# Patient Record
Sex: MALE | Race: WHITE | NOT HISPANIC OR LATINO | Employment: PART TIME | URBAN - METROPOLITAN AREA
[De-identification: names, ages, dates, MRNs, and addresses within clinical notes are randomized per-mention and may not be internally consistent; named-entity substitution may affect disease eponyms.]

---

## 2019-01-14 ENCOUNTER — OFFICE VISIT (OUTPATIENT)
Dept: SURGERY | Facility: CLINIC | Age: 74
End: 2019-01-14
Payer: MEDICARE

## 2019-01-14 VITALS
DIASTOLIC BLOOD PRESSURE: 80 MMHG | HEIGHT: 65 IN | HEART RATE: 75 BPM | SYSTOLIC BLOOD PRESSURE: 118 MMHG | BODY MASS INDEX: 25.16 KG/M2 | WEIGHT: 151 LBS

## 2019-01-14 DIAGNOSIS — K40.30 UNILATERAL INGUINAL HERNIA WITH OBSTRUCTION AND WITHOUT GANGRENE, RECURRENCE NOT SPECIFIED: Primary | ICD-10-CM

## 2019-01-14 PROCEDURE — 1101F PR PT FALLS ASSESS DOC 0-1 FALLS W/OUT INJ PAST YR: ICD-10-PCS | Mod: CPTII,S$GLB,, | Performed by: SURGERY

## 2019-01-14 PROCEDURE — 3079F PR MOST RECENT DIASTOLIC BLOOD PRESSURE 80-89 MM HG: ICD-10-PCS | Mod: CPTII,S$GLB,, | Performed by: SURGERY

## 2019-01-14 PROCEDURE — 3074F PR MOST RECENT SYSTOLIC BLOOD PRESSURE < 130 MM HG: ICD-10-PCS | Mod: CPTII,S$GLB,, | Performed by: SURGERY

## 2019-01-14 PROCEDURE — 3074F SYST BP LT 130 MM HG: CPT | Mod: CPTII,S$GLB,, | Performed by: SURGERY

## 2019-01-14 PROCEDURE — 99204 OFFICE O/P NEW MOD 45 MIN: CPT | Mod: S$GLB,,, | Performed by: SURGERY

## 2019-01-14 PROCEDURE — 3079F DIAST BP 80-89 MM HG: CPT | Mod: CPTII,S$GLB,, | Performed by: SURGERY

## 2019-01-14 PROCEDURE — 1101F PT FALLS ASSESS-DOCD LE1/YR: CPT | Mod: CPTII,S$GLB,, | Performed by: SURGERY

## 2019-01-14 PROCEDURE — 99204 PR OFFICE/OUTPT VISIT, NEW, LEVL IV, 45-59 MIN: ICD-10-PCS | Mod: S$GLB,,, | Performed by: SURGERY

## 2019-01-14 RX ORDER — SODIUM CHLORIDE 9 MG/ML
INJECTION, SOLUTION INTRAVENOUS CONTINUOUS
Status: CANCELLED | OUTPATIENT
Start: 2019-01-14

## 2019-01-14 RX ORDER — LIDOCAINE HYDROCHLORIDE 10 MG/ML
1 INJECTION, SOLUTION EPIDURAL; INFILTRATION; INTRACAUDAL; PERINEURAL ONCE
Status: DISCONTINUED | OUTPATIENT
Start: 2019-01-14 | End: 2019-01-28 | Stop reason: HOSPADM

## 2019-01-14 NOTE — H&P (VIEW-ONLY)
History & Physical    SUBJECTIVE:     History of Present Illness:  Patient is a 73 y.o. male presents with right groin pain and inguinal hernia for 2-3 months with increase in symptoms recently. Support briefs do give him relief.     Chief Complaint   Patient presents with    Groin Pain     possible bilateral hernia        Review of patient's allergies indicates:   Allergen Reactions    Trazodone      Nausea; felt sedated for days       Current Outpatient Medications   Medication Sig Dispense Refill    acetaminophen (TYLENOL) 325 MG tablet Take 650 mg by mouth every 4 to 6 hours as needed.        amoxicillin (AMOXIL) 500 MG capsule Take 500 mg by mouth 3 (three) times daily.      atovaquone-proguanil (MALARONE) 250-100 mg Tab Take one tablet daily for malaria prevention. Begin one day before entering malarious area and continue for 1 week after return. 25 tablet 0    belladonna alkaloids-opium 16.2-30 mg (BELLADONNA-OPIUM) 16.2-30 mg Supp Place 30 mg rectally every 12 (twelve) hours as needed.      ciprofloxacin (CIPRO) 500 MG tablet Take 500 mg by mouth 2 (two) times daily.      hydrochlorothiazide (HYDRODIURIL) 25 MG tablet Take 1 tablet (25 mg total) by mouth once daily. 90 tablet 3    ibuprofen (ADVIL,MOTRIN) 600 MG tablet Take 600 mg by mouth every 6 (six) hours as needed for Pain.      [facility]-me blue-sod phos-phsal-hyo (URIBEL) 118-10-40.8-36 mg Cap Take by mouth.      oxycodone-acetaminophen (PERCOCET) 5-325 mg per tablet Take 1 tablet by mouth every 4 (four) hours as needed for Pain.      phenazopyridine (PYRIDIUM) 200 MG tablet Take 200 mg by mouth 3 (three) times daily as needed for Pain.      typhoid (VIVOTIF) DR capsule One capsule every other day by mouth for 4 doses. 4 capsule 0    zolpidem (AMBIEN) 10 mg Tab Take 1 tablet (10 mg total) by mouth every evening. 30 tablet 2    lansoprazole (PREVACID) 30 MG capsule Take 1 capsule (30 mg total) by mouth once daily. 30 capsule 0    tamsulosin  "(FLOMAX) 0.4 mg Cp24 Take 1 capsule (0.4 mg total) by mouth after dinner. 30 capsule 12     No current facility-administered medications for this visit.        Past Medical History:   Diagnosis Date    Enlarged prostate     Hypertension      Past Surgical History:   Procedure Laterality Date    ARTHROPLASTY, SHOULDER, TOTAL, REVERSE Right 12/11/2012    [facility]    cystoscope      EXCISION Right 9/23/2013    Performed by Umberto Greenfield MD at [facility] OR    nephrectomy  2006    left for a tumor?    PROSTATE SURGERY      ROTATOR CUFF REPAIR      right    TONSILLECTOMY       Family History   Problem Relation Age of Onset    Cancer Mother         stomach    Heart disease Father     COPD Father      Social History     Tobacco Use    Smoking status: Never Smoker   Substance Use Topics    Alcohol use: No     Alcohol/week: 0.0 oz    Drug use: No        Review of Systems:  Review of Systems   Constitutional: Negative.    HENT: Negative.    Eyes: Negative.    Respiratory: Negative.    Cardiovascular: Negative.    Gastrointestinal: Negative.    Endocrine: Negative.    Musculoskeletal: Negative.    Skin: Negative.    Allergic/Immunologic: Negative.    Neurological: Negative.    Hematological: Negative.    Psychiatric/Behavioral: Negative.    All other systems reviewed and are negative.      OBJECTIVE:     Vital Signs (Most Recent)  Pulse: 75 (01/14/19 1404)  BP: 118/80 (01/14/19 1404)  5' 5" (1.651 m)  68.5 kg (151 lb)     Physical Exam:  Physical Exam   Constitutional: He is oriented to person, place, and time. He appears well-developed and well-nourished.   HENT:   Head: Normocephalic and atraumatic.   Right Ear: External ear normal.   Left Ear: External ear normal.   Nose: Nose normal.   Mouth/Throat: Oropharynx is clear and moist.   Eyes: Conjunctivae and EOM are normal. Pupils are equal, round, and reactive to light.   Neck: Normal range of motion. Neck supple. "   Cardiovascular: Normal rate, regular rhythm, normal heart sounds and intact distal pulses.   Pulmonary/Chest: Effort normal and breath sounds normal.   Abdominal: Soft. Bowel sounds are normal. A hernia is present. Hernia confirmed positive in the right inguinal area.   Genitourinary:         Musculoskeletal: Normal range of motion.   Neurological: He is alert and oriented to person, place, and time. He has normal reflexes.   Skin: Skin is warm and dry.   Psychiatric: He has a normal mood and affect. His behavior is normal. Thought content normal.   Vitals reviewed.      Laboratory  none    Diagnostic Results:  none    ASSESSMENT/PLAN:     Right inguinal hernia     PLAN:Plan     To the OR for Right inguinal hernia repair with the risks and possible complications explained and he agrees to proceed

## 2019-01-14 NOTE — PROGRESS NOTES
History & Physical    SUBJECTIVE:     History of Present Illness:  Patient is a 73 y.o. male presents with right groin pain and inguinal hernia for 2-3 months with increase in symptoms recently. Support briefs do give him relief.     Chief Complaint   Patient presents with    Groin Pain     possible bilateral hernia        Review of patient's allergies indicates:   Allergen Reactions    Trazodone      Nausea; felt sedated for days       Current Outpatient Medications   Medication Sig Dispense Refill    acetaminophen (TYLENOL) 325 MG tablet Take 650 mg by mouth every 4 to 6 hours as needed.        amoxicillin (AMOXIL) 500 MG capsule Take 500 mg by mouth 3 (three) times daily.      atovaquone-proguanil (MALARONE) 250-100 mg Tab Take one tablet daily for malaria prevention. Begin one day before entering malarious area and continue for 1 week after return. 25 tablet 0    belladonna alkaloids-opium 16.2-30 mg (BELLADONNA-OPIUM) 16.2-30 mg Supp Place 30 mg rectally every 12 (twelve) hours as needed.      ciprofloxacin (CIPRO) 500 MG tablet Take 500 mg by mouth 2 (two) times daily.      hydrochlorothiazide (HYDRODIURIL) 25 MG tablet Take 1 tablet (25 mg total) by mouth once daily. 90 tablet 3    ibuprofen (ADVIL,MOTRIN) 600 MG tablet Take 600 mg by mouth every 6 (six) hours as needed for Pain.      [facility]-me blue-sod phos-phsal-hyo (URIBEL) 118-10-40.8-36 mg Cap Take by mouth.      oxycodone-acetaminophen (PERCOCET) 5-325 mg per tablet Take 1 tablet by mouth every 4 (four) hours as needed for Pain.      phenazopyridine (PYRIDIUM) 200 MG tablet Take 200 mg by mouth 3 (three) times daily as needed for Pain.      typhoid (VIVOTIF) DR capsule One capsule every other day by mouth for 4 doses. 4 capsule 0    zolpidem (AMBIEN) 10 mg Tab Take 1 tablet (10 mg total) by mouth every evening. 30 tablet 2    lansoprazole (PREVACID) 30 MG capsule Take 1 capsule (30 mg total) by mouth once daily. 30 capsule 0    tamsulosin  "(FLOMAX) 0.4 mg Cp24 Take 1 capsule (0.4 mg total) by mouth after dinner. 30 capsule 12     No current facility-administered medications for this visit.        Past Medical History:   Diagnosis Date    Enlarged prostate     Hypertension      Past Surgical History:   Procedure Laterality Date    ARTHROPLASTY, SHOULDER, TOTAL, REVERSE Right 12/11/2012    [facility]formed [facility] at [facility] OR    cystoscope      EXCISION Right 9/23/2013    Performed by [facility]to [facility]field [facility] at [facility] OR    nephrectomy  2006    left for a tumor?    PROSTATE SURGERY      ROTATOR CUFF REPAIR      right    TONSILLECTOMY       Family History   Problem Relation Age of Onset    Cancer Mother         stomach    Heart disease Father     COPD Father      Social History     Tobacco Use    Smoking status: Never Smoker   Substance Use Topics    Alcohol use: No     Alcohol/week: 0.0 oz    Drug use: No        Review of Systems:  Review of Systems   Constitutional: Negative.    HENT: Negative.    Eyes: Negative.    Respiratory: Negative.    Cardiovascular: Negative.    Gastrointestinal: Negative.    Endocrine: Negative.    Musculoskeletal: Negative.    Skin: Negative.    Allergic/Immunologic: Negative.    Neurological: Negative.    Hematological: Negative.    Psychiatric/Behavioral: Negative.    All other systems reviewed and are negative.      OBJECTIVE:     Vital Signs (Most Recent)  Pulse: 75 (01/14/19 1404)  BP: 118/80 (01/14/19 1404)  5' 5" (1.651 m)  68.5 kg (151 lb)     Physical Exam:  Physical Exam   Constitutional: He is oriented to person, place, and time. He appears well-developed and well-nourished.   HENT:   Head: Normocephalic and atraumatic.   Right Ear: External ear normal.   Left Ear: External ear normal.   Nose: Nose normal.   Mouth/Throat: Oropharynx is clear and moist.   Eyes: Conjunctivae and EOM are normal. Pupils are equal, round, and reactive to light.   Neck: Normal range of motion. Neck supple. "   Cardiovascular: Normal rate, regular rhythm, normal heart sounds and intact distal pulses.   Pulmonary/Chest: Effort normal and breath sounds normal.   Abdominal: Soft. Bowel sounds are normal. A hernia is present. Hernia confirmed positive in the right inguinal area.   Genitourinary:         Musculoskeletal: Normal range of motion.   Neurological: He is alert and oriented to person, place, and time. He has normal reflexes.   Skin: Skin is warm and dry.   Psychiatric: He has a normal mood and affect. His behavior is normal. Thought content normal.   Vitals reviewed.      Laboratory  none    Diagnostic Results:  none    ASSESSMENT/PLAN:     Right inguinal hernia     PLAN:Plan     To the OR for Right inguinal hernia repair with the risks and possible complications explained and he agrees to proceed

## 2019-01-14 NOTE — LETTER
January 14, 2019      Donna Green MD  [address] [facility]  [address]  [facility]  [place] [address]           [facility], [facility]  [address] [facility] [address], [address]  [place] [address]  Phone: [phone]  Fax: [phone]          Patient: Jose Davis   MR Number: [id number]   YOB: 1945   Date of Visit: 1/14/2019       Dear Dr. Donna Green:    Thank you for referring Jose Davis to me for evaluation. Attached you will find relevant portions of my assessment and plan of care.    If you have questions, please do not hesitate to call me. I look forward to following Jsoe Davis along with you.    Sincerely,    Marco Antonio Paniagua MD    Enclosure  CC:  No Recipients    If you would like to receive this communication electronically, please contact [email or web address] or [phone] to request more information on [employer] access.    For providers and/or their staff who would like to refer a patient to [facility], please contact us through our one-stop-shop provider referral line, [facility], at [phone].    If you feel you have received this communication in error or would no longer like to receive these types of communications, please e-mail [email or web address]

## 2019-01-21 ENCOUNTER — HOSPITAL ENCOUNTER (OUTPATIENT)
Dept: PREADMISSION TESTING | Facility: HOSPITAL | Age: 74
Discharge: HOME | End: 2019-01-21
Attending: SURGERY
Payer: MEDICARE

## 2019-01-21 VITALS
WEIGHT: 135 LBS | HEIGHT: 65 IN | OXYGEN SATURATION: 99 % | BODY MASS INDEX: 22.49 KG/M2 | DIASTOLIC BLOOD PRESSURE: 84 MMHG | RESPIRATION RATE: 18 BRPM | HEART RATE: 64 BPM | TEMPERATURE: 98 F | SYSTOLIC BLOOD PRESSURE: 136 MMHG

## 2019-01-21 DIAGNOSIS — Z01.818 PREOP TESTING: Primary | ICD-10-CM

## 2019-01-21 LAB
ANION GAP SERPL CALC-SCNC: 8 MMOL/L
BASOPHILS # BLD AUTO: 0.01 K/UL
BASOPHILS NFR BLD: 0.1 %
BUN SERPL-MCNC: 21 MG/DL
CALCIUM SERPL-MCNC: 9.5 MG/DL
CHLORIDE SERPL-SCNC: 103 MMOL/L
CO2 SERPL-SCNC: 26 MMOL/L
CREAT SERPL-MCNC: 1.1 MG/DL
DIFFERENTIAL METHOD: ABNORMAL
EOSINOPHIL # BLD AUTO: 0.3 K/UL
EOSINOPHIL NFR BLD: 4.4 %
ERYTHROCYTE [DISTWIDTH] IN BLOOD BY AUTOMATED COUNT: 12.8 %
EST. GFR  (AFRICAN AMERICAN): >60 ML/MIN/1.73 M^2
EST. GFR  (NON AFRICAN AMERICAN): >60 ML/MIN/1.73 M^2
GLUCOSE SERPL-MCNC: 101 MG/DL
HCT VFR BLD AUTO: 40.7 %
HGB BLD-MCNC: 13.7 G/DL
LYMPHOCYTES # BLD AUTO: 1.2 K/UL
LYMPHOCYTES NFR BLD: 18 %
MCH RBC QN AUTO: 29.4 PG
MCHC RBC AUTO-ENTMCNC: 33.7 G/DL
MCV RBC AUTO: 87 FL
MONOCYTES # BLD AUTO: 0.5 K/UL
MONOCYTES NFR BLD: 7.2 %
NEUTROPHILS # BLD AUTO: 4.8 K/UL
NEUTROPHILS NFR BLD: 70.3 %
PLATELET # BLD AUTO: 210 K/UL
PMV BLD AUTO: 10.1 FL
POTASSIUM SERPL-SCNC: 4 MMOL/L
RBC # BLD AUTO: 4.66 M/UL
SODIUM SERPL-SCNC: 137 MMOL/L
WBC # BLD AUTO: 6.85 K/UL

## 2019-01-21 PROCEDURE — 85025 COMPLETE CBC W/AUTO DIFF WBC: CPT

## 2019-01-21 PROCEDURE — 93010 ELECTROCARDIOGRAM REPORT: CPT | Mod: ,,, | Performed by: INTERNAL MEDICINE

## 2019-01-21 PROCEDURE — 36415 COLL VENOUS BLD VENIPUNCTURE: CPT

## 2019-01-21 PROCEDURE — 93010 EKG 12-LEAD: ICD-10-PCS | Mod: ,,, | Performed by: INTERNAL MEDICINE

## 2019-01-21 PROCEDURE — 80048 BASIC METABOLIC PNL TOTAL CA: CPT

## 2019-01-21 PROCEDURE — 93005 ELECTROCARDIOGRAM TRACING: CPT

## 2019-01-21 RX ORDER — ASPIRIN 81 MG/1
81 TABLET ORAL DAILY
COMMUNITY

## 2019-01-21 RX ORDER — ROSUVASTATIN CALCIUM 20 MG/1
20 TABLET, COATED ORAL NIGHTLY
COMMUNITY

## 2019-01-21 RX ORDER — AMLODIPINE BESYLATE 5 MG/1
5 TABLET ORAL DAILY
COMMUNITY
End: 2022-05-20 | Stop reason: CLARIF

## 2019-01-21 NOTE — DISCHARGE INSTRUCTIONS
"Your procedure  is scheduled for __1/28/2019________.    Call [phone] between 2pm and 5pm on _1/25/2019______to find out your arrival time for the day of surgery.    Report to Same Day Surgery Unit at ____ AM on the 2nd floor of the hospital.  Use the front entrance of the hospital.  The front doors of the hospital open promptly at 5:30am.  If you need wheelchair assistance, call [phone] from your cell phone, or call "0" from the courtesy phone in the lobby.    Important instructions:   Do not eat or drink after 12 midnight, including water.  It is okay to brush your teeth.  Do not have gum, candy or mints.     Take only these medications with a small swallow of water on the morning of your surgery ___amlodipine___________        Stop taking Aspirin, Ibuprofen, Motrin and Aleve , Fish oil, and Vitamin E for at least 7 days before your surgery. You may use Tylenol unless otherwise instructed by your doctor.         Please shower the night before and the morning of your surgery.        Use Hibiclens soap to your surgery site if instructed by your pre op nurse.   If your surgery is on your abdomen, be sure to wash your naval.  Be sure to rinse off Hibiclens after it is on your skin for several minutes.  Do not use Hibiclens on your face or genitals. Please place clean linens on your bed the night before surgery. Please wear fresh clean clothing after each shower.     No shaving of procedural area at least 4-5 days before surgery due to increased risk of skin irritation and/or possible infection.     You may wear deodorant only.      Do not wear powder, body lotion or perfume/cologne.     Do not wear any jewelry or have any metal on your body.     You will be asked to remove any dentures or partials for the procedure.     Please bring any documents given to you by your doctor.     If you are going home on the same day of surgery, you must arrange for a family member or a friend to drive you home.  Public " transportation is prohibited.  You will not be able to drive home if you were given anesthesia or sedation.     Wear loose fitting clothes allowing for bandages.     Please leave money and valuables home.       You may bring your cell phone.     Call the doctor if fever or illness should occur before your surgery.    Call [phone] to contact us here if needed.

## 2019-01-28 ENCOUNTER — ANESTHESIA EVENT (OUTPATIENT)
Dept: SURGERY | Facility: HOSPITAL | Age: 74
End: 2019-01-28
Payer: MEDICARE

## 2019-01-28 ENCOUNTER — NURSE TRIAGE (OUTPATIENT)
Dept: ADMINISTRATIVE | Facility: CLINIC | Age: 74
End: 2019-01-28

## 2019-01-28 ENCOUNTER — ANESTHESIA (OUTPATIENT)
Dept: SURGERY | Facility: HOSPITAL | Age: 74
End: 2019-01-28
Payer: MEDICARE

## 2019-01-28 ENCOUNTER — HOSPITAL ENCOUNTER (OUTPATIENT)
Facility: HOSPITAL | Age: 74
Discharge: HOME | End: 2019-01-28
Attending: SURGERY | Admitting: SURGERY
Payer: MEDICARE

## 2019-01-28 VITALS
HEART RATE: 67 BPM | RESPIRATION RATE: 18 BRPM | OXYGEN SATURATION: 96 % | BODY MASS INDEX: 22.49 KG/M2 | WEIGHT: 135 LBS | HEIGHT: 65 IN | TEMPERATURE: 98 F | SYSTOLIC BLOOD PRESSURE: 130 MMHG | DIASTOLIC BLOOD PRESSURE: 62 MMHG

## 2019-01-28 DIAGNOSIS — K40.30 UNILATERAL INGUINAL HERNIA WITH OBSTRUCTION AND WITHOUT GANGRENE, RECURRENCE NOT SPECIFIED: Primary | ICD-10-CM

## 2019-01-28 LAB — POCT GLUCOSE: 110 MG/DL (ref 70–110)

## 2019-01-28 PROCEDURE — C9290 INJ, BUPIVACAINE LIPOSOME: HCPCS | Performed by: SURGERY

## 2019-01-28 PROCEDURE — 88302 TISSUE EXAM BY PATHOLOGIST: CPT | Mod: 26,,, | Performed by: PATHOLOGY

## 2019-01-28 PROCEDURE — D9220A PRA ANESTHESIA: ICD-10-PCS | Mod: CRNA,,, | Performed by: NURSE ANESTHETIST, CERTIFIED REGISTERED

## 2019-01-28 PROCEDURE — 36000707: Performed by: SURGERY

## 2019-01-28 PROCEDURE — 63600175 PHARM REV CODE 636 W HCPCS: Performed by: NURSE ANESTHETIST, CERTIFIED REGISTERED

## 2019-01-28 PROCEDURE — D9220A PRA ANESTHESIA: Mod: ANES,,, | Performed by: ANESTHESIOLOGY

## 2019-01-28 PROCEDURE — 25000003 PHARM REV CODE 250: Performed by: SURGERY

## 2019-01-28 PROCEDURE — 25000003 PHARM REV CODE 250: Performed by: NURSE ANESTHETIST, CERTIFIED REGISTERED

## 2019-01-28 PROCEDURE — C1781 MESH (IMPLANTABLE): HCPCS | Performed by: SURGERY

## 2019-01-28 PROCEDURE — 71000033 HC RECOVERY, INTIAL HOUR: Performed by: SURGERY

## 2019-01-28 PROCEDURE — 71000039 HC RECOVERY, EACH ADD'L HOUR: Performed by: SURGERY

## 2019-01-28 PROCEDURE — D9220A PRA ANESTHESIA: ICD-10-PCS | Mod: ANES,,, | Performed by: ANESTHESIOLOGY

## 2019-01-28 PROCEDURE — S0020 INJECTION, BUPIVICAINE HYDRO: HCPCS | Performed by: SURGERY

## 2019-01-28 PROCEDURE — 63600175 PHARM REV CODE 636 W HCPCS: Performed by: SURGERY

## 2019-01-28 PROCEDURE — 71000015 HC POSTOP RECOV 1ST HR: Performed by: SURGERY

## 2019-01-28 PROCEDURE — 37000009 HC ANESTHESIA EA ADD 15 MINS: Performed by: SURGERY

## 2019-01-28 PROCEDURE — 71000016 HC POSTOP RECOV ADDL HR: Performed by: SURGERY

## 2019-01-28 PROCEDURE — 82962 GLUCOSE BLOOD TEST: CPT | Performed by: SURGERY

## 2019-01-28 PROCEDURE — 37000008 HC ANESTHESIA 1ST 15 MINUTES: Performed by: SURGERY

## 2019-01-28 PROCEDURE — D9220A PRA ANESTHESIA: Mod: CRNA,,, | Performed by: NURSE ANESTHETIST, CERTIFIED REGISTERED

## 2019-01-28 PROCEDURE — 88302 TISSUE SPECIMEN TO PATHOLOGY - SURGERY: ICD-10-PCS | Mod: 26,,, | Performed by: PATHOLOGY

## 2019-01-28 PROCEDURE — 49505 PRP I/HERN INIT REDUC >5 YR: CPT | Mod: RT,,, | Performed by: SURGERY

## 2019-01-28 PROCEDURE — 49505 PR REPAIR ING HERNIA,5+Y/O,REDUCIBL: ICD-10-PCS | Mod: RT,,, | Performed by: SURGERY

## 2019-01-28 PROCEDURE — 25000003 PHARM REV CODE 250: Performed by: ANESTHESIOLOGY

## 2019-01-28 PROCEDURE — 88302 TISSUE EXAM BY PATHOLOGIST: CPT | Performed by: PATHOLOGY

## 2019-01-28 PROCEDURE — 36000706: Performed by: SURGERY

## 2019-01-28 DEVICE — MESH PROLENE HERNIA 3IN: Type: IMPLANTABLE DEVICE | Site: GROIN | Status: FUNCTIONAL

## 2019-01-28 RX ORDER — ACETAMINOPHEN 10 MG/ML
1000 INJECTION, SOLUTION INTRAVENOUS ONCE
Status: COMPLETED | OUTPATIENT
Start: 2019-01-28 | End: 2019-01-28

## 2019-01-28 RX ORDER — FENTANYL CITRATE 50 UG/ML
INJECTION, SOLUTION INTRAMUSCULAR; INTRAVENOUS
Status: DISCONTINUED | OUTPATIENT
Start: 2019-01-28 | End: 2019-01-28

## 2019-01-28 RX ORDER — ROCURONIUM BROMIDE 10 MG/ML
INJECTION, SOLUTION INTRAVENOUS
Status: DISCONTINUED | OUTPATIENT
Start: 2019-01-28 | End: 2019-01-28

## 2019-01-28 RX ORDER — SODIUM CHLORIDE 0.9 % (FLUSH) 0.9 %
3 SYRINGE (ML) INJECTION
Status: DISCONTINUED | OUTPATIENT
Start: 2019-01-28 | End: 2019-01-28 | Stop reason: HOSPADM

## 2019-01-28 RX ORDER — OXYCODONE AND ACETAMINOPHEN 5; 325 MG/1; MG/1
1 TABLET ORAL EVERY 4 HOURS PRN
Qty: 30 TABLET | Refills: 0 | Status: SHIPPED | OUTPATIENT
Start: 2019-01-28 | End: 2019-02-20

## 2019-01-28 RX ORDER — BUPIVACAINE HYDROCHLORIDE 2.5 MG/ML
INJECTION, SOLUTION INFILTRATION; PERINEURAL
Status: DISCONTINUED | OUTPATIENT
Start: 2019-01-28 | End: 2019-01-28 | Stop reason: HOSPADM

## 2019-01-28 RX ORDER — LIDOCAINE HCL/PF 100 MG/5ML
SYRINGE (ML) INTRAVENOUS
Status: DISCONTINUED | OUTPATIENT
Start: 2019-01-28 | End: 2019-01-28

## 2019-01-28 RX ORDER — SODIUM CHLORIDE 9 MG/ML
INJECTION, SOLUTION INTRAVENOUS CONTINUOUS
Status: DISCONTINUED | OUTPATIENT
Start: 2019-01-28 | End: 2019-01-28 | Stop reason: HOSPADM

## 2019-01-28 RX ORDER — LIDOCAINE HYDROCHLORIDE 10 MG/ML
1 INJECTION, SOLUTION EPIDURAL; INFILTRATION; INTRACAUDAL; PERINEURAL ONCE
Status: DISCONTINUED | OUTPATIENT
Start: 2019-01-28 | End: 2019-01-28 | Stop reason: HOSPADM

## 2019-01-28 RX ORDER — CEFAZOLIN SODIUM 2 G/50ML
2 SOLUTION INTRAVENOUS
Status: COMPLETED | OUTPATIENT
Start: 2019-01-28 | End: 2019-01-28

## 2019-01-28 RX ORDER — ONDANSETRON 2 MG/ML
INJECTION INTRAMUSCULAR; INTRAVENOUS
Status: DISCONTINUED | OUTPATIENT
Start: 2019-01-28 | End: 2019-01-28

## 2019-01-28 RX ORDER — METOCLOPRAMIDE HYDROCHLORIDE 5 MG/ML
INJECTION INTRAMUSCULAR; INTRAVENOUS
Status: DISCONTINUED | OUTPATIENT
Start: 2019-01-28 | End: 2019-01-28

## 2019-01-28 RX ORDER — GLYCOPYRROLATE 0.2 MG/ML
INJECTION INTRAMUSCULAR; INTRAVENOUS
Status: DISCONTINUED | OUTPATIENT
Start: 2019-01-28 | End: 2019-01-28

## 2019-01-28 RX ORDER — MORPHINE SULFATE 10 MG/ML
3 INJECTION INTRAMUSCULAR; INTRAVENOUS; SUBCUTANEOUS
Status: DISCONTINUED | OUTPATIENT
Start: 2019-01-28 | End: 2019-01-28 | Stop reason: HOSPADM

## 2019-01-28 RX ORDER — PHENYLEPHRINE HYDROCHLORIDE 10 MG/ML
INJECTION INTRAVENOUS
Status: DISCONTINUED | OUTPATIENT
Start: 2019-01-28 | End: 2019-01-28

## 2019-01-28 RX ORDER — SUCCINYLCHOLINE CHLORIDE 20 MG/ML
INJECTION INTRAMUSCULAR; INTRAVENOUS
Status: DISCONTINUED | OUTPATIENT
Start: 2019-01-28 | End: 2019-01-28

## 2019-01-28 RX ORDER — NEOSTIGMINE METHYLSULFATE 1 MG/ML
INJECTION, SOLUTION INTRAVENOUS
Status: DISCONTINUED | OUTPATIENT
Start: 2019-01-28 | End: 2019-01-28

## 2019-01-28 RX ORDER — SODIUM CHLORIDE, SODIUM LACTATE, POTASSIUM CHLORIDE, CALCIUM CHLORIDE 600; 310; 30; 20 MG/100ML; MG/100ML; MG/100ML; MG/100ML
INJECTION, SOLUTION INTRAVENOUS CONTINUOUS
Status: DISCONTINUED | OUTPATIENT
Start: 2019-01-28 | End: 2019-01-28 | Stop reason: HOSPADM

## 2019-01-28 RX ORDER — PROPOFOL 10 MG/ML
VIAL (ML) INTRAVENOUS
Status: DISCONTINUED | OUTPATIENT
Start: 2019-01-28 | End: 2019-01-28

## 2019-01-28 RX ORDER — MIDAZOLAM HYDROCHLORIDE 1 MG/ML
INJECTION, SOLUTION INTRAMUSCULAR; INTRAVENOUS
Status: DISCONTINUED | OUTPATIENT
Start: 2019-01-28 | End: 2019-01-28

## 2019-01-28 RX ORDER — HYDROMORPHONE HYDROCHLORIDE 2 MG/ML
0.2 INJECTION, SOLUTION INTRAMUSCULAR; INTRAVENOUS; SUBCUTANEOUS EVERY 5 MIN PRN
Status: DISCONTINUED | OUTPATIENT
Start: 2019-01-28 | End: 2019-01-28 | Stop reason: HOSPADM

## 2019-01-28 RX ADMIN — PHENYLEPHRINE HYDROCHLORIDE 100 MCG: 10 INJECTION INTRAVENOUS at 11:01

## 2019-01-28 RX ADMIN — ROCURONIUM BROMIDE 5 MG: 10 INJECTION, SOLUTION INTRAVENOUS at 10:01

## 2019-01-28 RX ADMIN — ACETAMINOPHEN 1000 MG: 10 INJECTION, SOLUTION INTRAVENOUS at 12:01

## 2019-01-28 RX ADMIN — GLYCOPYRROLATE 0.6 MG: 0.2 INJECTION, SOLUTION INTRAMUSCULAR; INTRAVENOUS at 11:01

## 2019-01-28 RX ADMIN — METOCLOPRAMIDE 10 MG: 5 INJECTION, SOLUTION INTRAMUSCULAR; INTRAVENOUS at 10:01

## 2019-01-28 RX ADMIN — CEFAZOLIN SODIUM 2 G: 2 SOLUTION INTRAVENOUS at 10:01

## 2019-01-28 RX ADMIN — SUCCINYLCHOLINE CHLORIDE 120 MG: 20 INJECTION, SOLUTION INTRAMUSCULAR; INTRAVENOUS at 10:01

## 2019-01-28 RX ADMIN — NEOSTIGMINE METHYLSULFATE 5 MG: 1 INJECTION INTRAVENOUS at 11:01

## 2019-01-28 RX ADMIN — ONDANSETRON 4 MG: 2 INJECTION, SOLUTION INTRAMUSCULAR; INTRAVENOUS at 10:01

## 2019-01-28 RX ADMIN — GLYCOPYRROLATE 0.2 MG: 0.2 INJECTION, SOLUTION INTRAMUSCULAR; INTRAVENOUS at 10:01

## 2019-01-28 RX ADMIN — MIDAZOLAM HYDROCHLORIDE 2 MG: 1 INJECTION, SOLUTION INTRAMUSCULAR; INTRAVENOUS at 10:01

## 2019-01-28 RX ADMIN — ROCURONIUM BROMIDE 25 MG: 10 INJECTION, SOLUTION INTRAVENOUS at 10:01

## 2019-01-28 RX ADMIN — FENTANYL CITRATE 100 MCG: 50 INJECTION INTRAMUSCULAR; INTRAVENOUS at 10:01

## 2019-01-28 RX ADMIN — PHENYLEPHRINE HYDROCHLORIDE 200 MCG: 10 INJECTION INTRAVENOUS at 10:01

## 2019-01-28 RX ADMIN — SODIUM CHLORIDE, SODIUM LACTATE, POTASSIUM CHLORIDE, AND CALCIUM CHLORIDE: .6; .31; .03; .02 INJECTION, SOLUTION INTRAVENOUS at 11:01

## 2019-01-28 RX ADMIN — SODIUM CHLORIDE: 0.9 INJECTION, SOLUTION INTRAVENOUS at 07:01

## 2019-01-28 RX ADMIN — LIDOCAINE HYDROCHLORIDE 100 MG: 20 INJECTION, SOLUTION INTRAVENOUS at 10:01

## 2019-01-28 RX ADMIN — Medication 10 MG: at 11:01

## 2019-01-28 RX ADMIN — Medication 20 MG: at 11:01

## 2019-01-28 RX ADMIN — PROPOFOL 140 MG: 10 INJECTION, EMULSION INTRAVENOUS at 10:01

## 2019-01-28 NOTE — ANESTHESIA PREPROCEDURE EVALUATION
01/28/2019  Jose Davis is a 73 y.o., male.    Anesthesia Evaluation         Review of Systems  Anesthesia Hx:  No previous Anesthesia   Social:  Non-Smoker    Hematology/Oncology:  Hematology Normal   Oncology Normal     EENT/Dental:EENT/Dental Normal   Cardiovascular:   Hypertension Good exercise tolerance   Pulmonary:  Pulmonary Normal    Renal/:  Renal/ Normal     Hepatic/GI:   Liver Disease, Hepatitis    Musculoskeletal:   Arthritis     Neurological:  Neurology Normal    Endocrine:  Endocrine Normal    Dermatological:  Skin Normal    Psych:  Psychiatric Normal           Physical Exam  General:  Well nourished    Airway/Jaw/Neck:  Airway Findings: Mallampati: II TM Distance: < 4 cm      Dental:  DENTAL FINDINGS: Normal   Chest/Lungs:  Chest/Lungs Clear    Heart/Vascular:  Heart Findings: Normal       Mental Status:  Mental Status Findings:  Cooperative, Alert and Oriented         Anesthesia Plan  Type of Anesthesia, risks & benefits discussed:  Anesthesia Type:  general  Patient's Preference:   Intra-op Monitoring Plan: standard ASA monitors  Intra-op Monitoring Plan Comments:   Post Op Pain Control Plan: multimodal analgesia, IV/PO Opioids PRN and per primary service following discharge from PACU  Post Op Pain Control Plan Comments:   Induction:    Beta Blocker:  Patient is not currently on a Beta-Blocker (No further documentation required).       Informed Consent: Patient understands risks and agrees with Anesthesia plan.  Questions answered. Anesthesia consent signed with patient.  ASA Score: 3     Day of Surgery Review of History & Physical:    H&P update referred to the provider.  H&P completed by Anesthesiologist.   Anesthesia Plan Notes: npo        Ready For Surgery From Anesthesia Perspective.

## 2019-01-28 NOTE — INTERVAL H&P NOTE
The patient has been examined and the H&P has been reviewed:    I concur with the findings and no changes have occurred since H&P was written.    Anesthesia/Surgery risks, benefits and alternative options discussed and understood by patient/family.          Active Hospital Problems    Diagnosis  POA    Unilateral inguinal hernia with obstruction and without gangrene [K40.30]  Yes      Resolved Hospital Problems   No resolved problems to display.

## 2019-01-28 NOTE — ANESTHESIA POSTPROCEDURE EVALUATION
"Anesthesia Post Evaluation    Patient: Jose Davis    Procedure(s) Performed: Procedure(s) (LRB):  REPAIR, HERNIA, INGUINAL, WITHOUT HISTORY OF PRIOR REPAIR, AGE 5 YEARS OR OLDER (Right)    Final Anesthesia Type: general  Patient location during evaluation: PACU  Patient participation: Yes- Able to Participate  Level of consciousness: awake and alert, oriented and awake  Post-procedure vital signs: reviewed and stable  Pain management: adequate  Airway patency: patent  PONV status at discharge: No PONV  Anesthetic complications: no      Cardiovascular status: blood pressure returned to baseline, hemodynamically stable and stable  Respiratory status: unassisted and spontaneous ventilation  Hydration status: euvolemic  Follow-up not needed.        Visit Vitals  BP (!) 161/76 (BP Location: Right arm, Patient Position: Lying)   Pulse 85   Temp 36.3 °C (97.3 °F) (Oral)   Resp 18   Ht 5' 5" (1.651 m)   Wt 61.2 kg (135 lb)   SpO2 100%   BMI 22.47 kg/m²       Pain/Dominique Score: Dominique Score: 8 (1/28/2019 11:50 AM)        "

## 2019-01-28 NOTE — BRIEF OP NOTE
[facility]  Brief Operative Note     SUMMARY     Surgery Date: 1/28/2019     Surgeon(s) and Role:     * Marco Antonio Paniagua MD - Primary     * Rufus Dejesus MD - Resident - Assisting        Pre-op Diagnosis:  Unilateral inguinal hernia with obstruction and without gangrene, recurrence not specified [K40.30]    Post-op Diagnosis:  Post-Op Diagnosis Codes:     * Unilateral inguinal hernia with obstruction and without gangrene, recurrence not specified [K40.30]    Procedure(s) (LRB):  REPAIR, HERNIA, INGUINAL, WITHOUT HISTORY OF PRIOR REPAIR, AGE 5 YEARS OR OLDER (Right)    Anesthesia: General    Description of the findings of the procedure: indirect right inguinal hernia    Findings/Key Components: same    Estimated Blood Loss: minimal         Specimens:   Specimen (12h ago, onward)    Start     Ordered    01/28/19 1118  Specimen to Pathology - Surgery  Once     Comments:  Hernia Sac     Start Status   01/28/19 1118 Collected (01/28/19 1105)       01/28/19 1117          Discharge Note    SUMMARY     Admit Date: 1/28/2019    Discharge Date and Time:  01/28/2019 11:23 AM    Hospital Course (synopsis of major diagnoses, care, treatment, and services provided during the course of the hospital stay): uneventful post op course     Final Diagnosis: Post-Op Diagnosis Codes:     * Unilateral inguinal hernia with obstruction and without gangrene, recurrence not specified [K40.30]    Disposition: Home or Self Care    Follow Up/Patient Instructions:     Medications:  Reconciled Home Medications:      Medication List      START taking these medications    oxyCODONE-acetaminophen 5-325 mg per tablet  Commonly known as:  PERCOCET  Take 1 tablet by mouth every 4 (four) hours as needed for Pain.        CONTINUE taking these medications    acetaminophen 325 MG tablet  Commonly known as:  TYLENOL  Take 650 mg by mouth every 4 to 6 hours as needed.     amLODIPine 5 MG tablet  Commonly known as:  NORVASC  Take 5 mg by  mouth once daily.     aspirin 81 MG EC tablet  Commonly known as:  ECOTRIN  Take 81 mg by mouth once daily.     rosuvastatin 20 MG tablet  Commonly known as:  CRESTOR  Take 20 mg by mouth once daily.          Discharge Procedure Orders   Diet general     Call MD for:  temperature >100.4     Call MD for:  persistent nausea and vomiting     Call MD for:  severe uncontrolled pain     Call MD for:  difficulty breathing, headache or visual disturbances     Call MD for:  redness, tenderness, or signs of infection (pain, swelling, redness, odor or green/yellow discharge around incision site)     Call MD for:  hives     Remove dressing in 24 hours     Shower on day dressing removed (No bath)     Follow-up Information     Marco Antonio Paniagua MD In 1 week.    Specialties:  General Surgery, Oncology  Contact information:  [address]  [address] [place] [id number] [phone]

## 2019-01-28 NOTE — DISCHARGE INSTRUCTIONS
Dermabond/ surgical tape (PRINEO)  or a material like it was used on your incision.     It is like a tape/mesh infused with a skin glue .   Do not peel or try to remove it it will start to fall off in 7-10 days on its' own.   It is OK to shower, pat dry, do not apply any creams or lotions.    No tub baths, swimming pools, hot tubs or submersion of the incision until your surgeon says it's ok.          Art[facility]   Office # [phone]     Discharge Instructions for Same Day Surgery     Call the office for and appointment if one has not already been made.     Diet: Drink plenty of fluids the first 48 hours and you may resume your   usual diet.     Activity: No heavy lifting (over 10 pounds), pushing or pulling until your   post op visit. Your doctor's office may have told you to limit your lifting to less weight, or even no weight.  Be sure to follow those instructions.    Note: You may ride in a car and you may drive when comfortable.     Do not drive, drink alcohol, or sign legal documents for 24 hours, or if taking narcotic pain medication.         Medical: Call the doctor for any of the following problems: fever above 101,   severe pain, bleeding, or abdominal distention (swelling).   If constipated you may take any stool softener you choose.     Occasionally small areas of skin numbness or an unpleasant skin sensation can result. Also, you may find that your incision is swollen and tender for a few days.  Some redness around sutures and staples is a normal reaction, but if the discomfort persists or worsens, call you doctor.                                                    Exparel information      To help control your pain after surgery, your surgeon injected Exparel (bupicacaine liposome injectable suspension) into your surgical incision just before the end of the procedure.      Exparel is a local analgesic that contains the local anesthetic bupivacaine..  Local anesthetics provide pain relief  by numbing the tissue around the surgical site.    Exparel is specifically designed to release pain medication over time an can control pain for up to 72 hours.    In addition to Exparel, your surgeon may provide other pain medications to control your pain.    Each patient is different and responds differently to pain medication.  Depending on how you respond to Exparel, you may require less additional pain medication during your recovery.    Side effects can occur with any medication and it is important not to ignore anything you may be experiencing.  Some patients who received Exparel experienced nausea, vomiting, or constipation.  Rarely, patients who receive bupivacaine (the active ingredient in Exparel) have experienced numbness and tingling in their mouth or lips, lightheadedness, or anxiety.  Speak with your doctor right away if you think you may be experiencing any of these sensations, or if you have other questions regarding possible side effects.    Products that contain bupivacaine, like Exparel, may cause a temporary loss of sensation or the ability to move in the area where bupivacaine was injected.    Other formulations of bupivacaine should not be administered within 96 hours following administrations of Exparel.  Do not remove the teal colored bracelet that you have on for 96 hours.  This bracelet will let other health care workers know that you have received Exparel, and not to give you bupivacaine during this 96 hours.    Fall Prevention  Millions of people fall every year and injure themselves. You may have had anesthesia or sedation which may increase your risk of falling. You may have health issues that put you at an increased risk of falling.     Here are ways to reduce your risk of falling.  ·   · Make your home safe by keeping walkways clear of objects you may trip over.  · Use non-slip pads under rugs. Do not use area rugs or small throw rugs.  · Use non-slip mats in bathtubs and  showers.  · Install handrails and lights on staircases.  · Do not walk in poorly lit areas.  · Do not stand on chairs or wobbly ladders.  · Use caution when reaching overhead or looking upward. This position can cause a loss of balance.  · Be sure your shoes fit properly, have non-slip bottoms and are in good condition.   · Wear shoes both inside and out. Avoid going barefoot or wearing slippers.  · Be cautious when going up and down stairs, curbs, and when walking on uneven sidewalks.  · If your balance is poor, consider using a cane or walker.  · If your fall was related to alcohol use, stop or limit alcohol intake.   · If your fall was related to use of sleeping medicines, talk to your doctor about this. You may need to reduce your dosage at bedtime if you awaken during the night to go to the bathroom.    · To reduce the need for nighttime bathroom trips:  ¨ Avoid drinking fluids for several hours before going to bed  ¨ Empty your bladder before going to bed  ¨ Men can keep a urinal at the bedside  · Stay as active as you can. Balance, flexibility, strength, and endurance all come from exercise. They all play a role in preventing falls. Ask your healthcare provider which types of activity are right for you.  · Get your vision checked on a regular basis.  · If you have pets, know where they are before you stand up or walk so you don't trip over them.  · Use night lights.

## 2019-01-28 NOTE — TRANSFER OF CARE
"Anesthesia Transfer of Care Note    Patient: Jose Davis    Procedure(s) Performed: Procedure(s) (LRB):  REPAIR, HERNIA, INGUINAL, WITHOUT HISTORY OF PRIOR REPAIR, AGE 5 YEARS OR OLDER (Right)    Patient location: PACU    Anesthesia Type: general    Transport from OR: Transported from OR on room air with adequate spontaneous ventilation    Post pain: adequate analgesia    Post assessment: no apparent anesthetic complications    Post vital signs: stable    Level of consciousness: awake    Nausea/Vomiting: no nausea/vomiting    Complications: none    Transfer of care protocol was followed      Last vitals:   Visit Vitals  BP (!) 161/76 (BP Location: Right arm, Patient Position: Lying)   Pulse 85   Temp 36.3 °C (97.3 °F) (Oral)   Resp 18   Ht 5' 5" (1.651 m)   Wt 61.2 kg (135 lb)   SpO2 100%   BMI 22.47 kg/m²     "

## 2019-01-28 NOTE — OP NOTE
Inguinal hernia      DATE OF PROCEDURE: 01/28/2019     PREOPERATIVE DIAGNOSIS: Right inguinal hernia.     POSTOPERATIVE DIAGNOSIS: same     PROCEDURE PERFORMED: Right inguinal hernia repair.     SURGEON: Marco Antonio Paniagua M.D.     ANESTHESIA: General.     BLOOD LOSS: Minimal.     DESCRIPTION OF OPERATION: The patient was brought to the Operating Room, placed  on the operating room table in supine position. Under adequate general   anesthesia, prepped and draped around his right groin in the usual sterile   fashion. An ilioinguinal block was done in the patient's anterior superior   iliac spine transdermally with Exparel. The patient then had an incision made   in his groin, deepened to expose the external oblique aponeurosis. This was   divided. Spermatic cord was isolated. A small sac was identified and ligated.   The preperitoneal space was then entered and a Prolene hernia system mesh was   placed. The anterior portion of it was then placed in the inguinal canal and   tacked down in several spaces. A small slit was made to transmit the spermatic   cord. The external oblique aponeurosis was then reapproximated along with the   subcutaneous tissue and skin all in layers with absorbable suture. Prineo tape   was used as well as a bandage. The patient was awakened and transported to the   Recovery Room in satisfactory condition.

## 2019-01-29 ENCOUNTER — HOSPITAL ENCOUNTER (EMERGENCY)
Facility: HOSPITAL | Age: 74
Discharge: HOME | End: 2019-01-29
Attending: EMERGENCY MEDICINE
Payer: MEDICARE

## 2019-01-29 VITALS
DIASTOLIC BLOOD PRESSURE: 62 MMHG | BODY MASS INDEX: 22.88 KG/M2 | SYSTOLIC BLOOD PRESSURE: 133 MMHG | HEIGHT: 64 IN | RESPIRATION RATE: 18 BRPM | OXYGEN SATURATION: 100 % | TEMPERATURE: 99 F | WEIGHT: 134 LBS | HEART RATE: 79 BPM

## 2019-01-29 DIAGNOSIS — R33.9 URINARY RETENTION: Primary | ICD-10-CM

## 2019-01-29 LAB
ANION GAP SERPL CALC-SCNC: 8 MMOL/L
BACTERIA #/AREA URNS HPF: ABNORMAL /HPF
BILIRUB UR QL STRIP: NEGATIVE
BUN SERPL-MCNC: 12 MG/DL
CALCIUM SERPL-MCNC: 9.3 MG/DL
CHLORIDE SERPL-SCNC: 102 MMOL/L
CLARITY UR: CLEAR
CO2 SERPL-SCNC: 25 MMOL/L
COLOR UR: ABNORMAL
CREAT SERPL-MCNC: 1.1 MG/DL
EST. GFR  (AFRICAN AMERICAN): >60 ML/MIN/1.73 M^2
EST. GFR  (NON AFRICAN AMERICAN): >60 ML/MIN/1.73 M^2
GLUCOSE SERPL-MCNC: 113 MG/DL
GLUCOSE UR QL STRIP: ABNORMAL
HGB UR QL STRIP: ABNORMAL
HYALINE CASTS #/AREA URNS LPF: 0 /LPF
KETONES UR QL STRIP: NEGATIVE
LEUKOCYTE ESTERASE UR QL STRIP: NEGATIVE
MICROSCOPIC COMMENT: ABNORMAL
NITRITE UR QL STRIP: NEGATIVE
PH UR STRIP: 7 [PH] (ref 5–8)
POTASSIUM SERPL-SCNC: 3.9 MMOL/L
PROT UR QL STRIP: ABNORMAL
RBC #/AREA URNS HPF: >100 /HPF (ref 0–4)
SODIUM SERPL-SCNC: 135 MMOL/L
SP GR UR STRIP: 1 (ref 1–1.03)
URN SPEC COLLECT METH UR: ABNORMAL
UROBILINOGEN UR STRIP-ACNC: NEGATIVE EU/DL
WBC #/AREA URNS HPF: 8 /HPF (ref 0–5)

## 2019-01-29 PROCEDURE — 81000 URINALYSIS NONAUTO W/SCOPE: CPT

## 2019-01-29 PROCEDURE — 99284 EMERGENCY DEPT VISIT MOD MDM: CPT | Mod: 25

## 2019-01-29 PROCEDURE — 51702 INSERT TEMP BLADDER CATH: CPT

## 2019-01-29 PROCEDURE — 80048 BASIC METABOLIC PNL TOTAL CA: CPT

## 2019-01-29 RX ORDER — TAMSULOSIN HYDROCHLORIDE 0.4 MG/1
0.4 CAPSULE ORAL NIGHTLY
Qty: 15 CAPSULE | Refills: 0 | Status: SHIPPED | OUTPATIENT
Start: 2019-01-29 | End: 2020-10-19

## 2019-01-29 RX ORDER — DOCUSATE SODIUM 100 MG/1
100 CAPSULE, LIQUID FILLED ORAL 2 TIMES DAILY
Qty: 30 CAPSULE | Refills: 0 | Status: SHIPPED | OUTPATIENT
Start: 2019-01-29 | End: 2022-08-24 | Stop reason: CLARIF

## 2019-01-29 NOTE — ED NOTES
Attempted # 18 fr. Rosario inserted up to the hub, but would not pass prostate. Attempted # 16 fr. W/ same result. Then placed # 14 coude and got positive return.

## 2019-01-29 NOTE — ED NOTES
Leg bag attached and pt instructed on proper use and is ready for discharge. States feels 100 % better

## 2019-01-29 NOTE — ED PROVIDER NOTES
"Encounter Date: 1/29/2019    SCRIBE #1 NOTE: I, Titus Lynch II, am scribing for, and in the presence of,  Polina Be MD. I have scribed the following portions of the note - Other sections scribed: HPI, ROS, PE.       History     Chief Complaint   Patient presents with    Urinary Retention     "I had a hernia operation today and they checked me to pee, I didn't pee. I've been drinking I don't know how many bottles of water. It's not passing at all, just slightly."      CC: Urinary Retention     HPI: This 73 y.o. male presents to the ED c/o acute onset, urinary retention and abdominal pain x6 hours. Pt reports he had hernia surgery at 3pm yesterday and states he was d/c later that day.  Patient did not return a prior to discharge because his surgery was delayed.  Pt reports drank numerous amounts of water in an attempt to urinate. Pt reports only a minimal amount was released when he attempted to urinate. Pt reports he was told to come to the ED if he was unable to empty his bladder. No alleviating factors. Abdominal pain is exacerbated with palpation and movement. Pt reports he had his right kidney removed some years ago. Pt reports hx of prostate problems but denies any medication. Pt denies back pain, numbness, weakness, and dysuria.        The history is provided by the patient. No  was used.     Review of patient's allergies indicates:  No Known Allergies  Past Medical History:   Diagnosis Date    Enlarged prostate     Hypertension      Past Surgical History:   Procedure Laterality Date    ARTHROPLASTY, SHOULDER, TOTAL, REVERSE Right 12/11/2012    Performed by Emerson Britt [facility] at [facility] OR    cyst excision from neck      cystoscope      EXCISION Right 9/23/2013    Performed by Umberto Greenfield MD at [facility] OR    nephrectomy  2006    left for a tumor?    PROSTATE SURGERY      ROTATOR CUFF REPAIR      right    TONSILLECTOMY       Family History   Problem Relation Age of Onset    " Cancer Mother         stomach    Heart disease Father     COPD Father      Social History     Tobacco Use    Smoking status: Never Smoker    Smokeless tobacco: Never Used   Substance Use Topics    Alcohol use: No     Alcohol/week: 0.0 oz    Drug use: No     Review of Systems   Constitutional: Negative for chills and fever.   HENT: Negative for congestion, ear pain, rhinorrhea and sore throat.    Eyes: Negative for pain and visual disturbance.   Respiratory: Negative for cough and shortness of breath.    Cardiovascular: Negative for chest pain.   Gastrointestinal: Positive for abdominal pain. Negative for diarrhea, nausea and vomiting.   Genitourinary: Positive for decreased urine volume and difficulty urinating. Negative for dysuria.   Musculoskeletal: Negative for back pain and neck pain.   Skin: Negative for rash.   Neurological: Negative for headaches.       Physical Exam     Initial Vitals [01/29/19 0012]   BP Pulse Resp Temp SpO2   (!) 136/92 91 18 97.9 °F (36.6 °C) 98 %      MAP       --         Physical Exam    Nursing note and vitals reviewed.  Constitutional: He appears well-developed and well-nourished. He is not diaphoretic. No distress.   HENT:   Head: Normocephalic and atraumatic.   Mouth/Throat: Oropharynx is clear and moist.   Eyes: Conjunctivae and EOM are normal. Pupils are equal, round, and reactive to light. No scleral icterus.   Neck: Normal range of motion. Neck supple. No JVD present.   Cardiovascular: Normal rate, regular rhythm and intact distal pulses.   Pulmonary/Chest: Breath sounds normal. No stridor. No respiratory distress.   Abdominal: Soft. Bowel sounds are normal. He exhibits no distension. There is no tenderness.   Genitourinary:   Genitourinary Comments: Right inguinal incision clean and dry without evidence of infection    Rosario catheter in place with light pink urine, no clots appreciated   Musculoskeletal: Normal range of motion. He exhibits no edema or tenderness.    Neurological: He is alert and oriented to person, place, and time. He has normal strength. No cranial nerve deficit.   Skin: Skin is warm and dry. No rash noted.   Psychiatric: He has a normal mood and affect. His behavior is normal.         ED Course   Procedures  Labs Reviewed   BASIC METABOLIC PANEL - Abnormal; Notable for the following components:       Result Value    Sodium 135 (*)     Glucose 113 (*)     All other components within normal limits   URINALYSIS, REFLEX TO URINE CULTURE - Abnormal; Notable for the following components:    Color, UA Red (*)     Protein, UA 2+ (*)     Glucose, UA 1+ (*)     Occult Blood UA 2+ (*)     All other components within normal limits    Narrative:     Preferred Collection Type->Urine, Clean Catch   URINALYSIS MICROSCOPIC - Abnormal; Notable for the following components:    RBC, UA >100 (*)     WBC, UA 8 (*)     All other components within normal limits    Narrative:     Preferred Collection Type->Urine, Clean Catch          Imaging Results    None          Medical Decision Making:   History:   Old Medical Records: I decided to obtain old medical records.  Old Records Summarized: other records.       <> Summary of Records: Patient underwent right inguinal hernia repair on 01/2019  Differential Diagnosis:   Urinary retention  Renal failure  Clinical Tests:   Lab Tests: Ordered and Reviewed  ED Management:  Bladder scan reveals urinary retention.  Rosario catheter placement attend by nursing with a 18 [place]i Rosario followed by 14 [place]i Rosario IR urine was not return.  A coude catheter was use an successfully drained approximately 1400 cc of urine.  Urinalysis demonstrates normal renal function. Patient has some hematuria but there are no clots.  Urine has light and while patient has been in the emergency department.  His Rosario catheter has been placed to a leg bag.  He is hemodynamically stable and fit for discharge to follow up with Urology as well as General surgery and his  primary physician.  Patient and wife counseled on supportive care, appropriate medication usage, concerning symptoms for which to return to ER and the importance of follow up. Understanding and agreement with treatment plan was expressed.   This chart was completed using dictation software, as a result there may be some typographical errors.             Scribe Attestation:   Scribe #1: I performed the above scribed service and the documentation accurately describes the services I performed. I attest to the accuracy of the note.    Attending Attestation:           Physician Attestation for Scribe:  Physician Attestation Statement for Scribe #1: I, Polina Be MD, reviewed documentation, as scribed by Titus Lynch II  in my presence, and it is both accurate and complete.                    Clinical Impression:   The encounter diagnosis was Urinary retention.      Disposition:   Disposition: Discharged  Condition: Stable                        Polina Be MD  01/29/19 0428

## 2019-01-29 NOTE — DISCHARGE INSTRUCTIONS
You were retaining urine so a Rosario catheter was placed to drain urine.  Because your prostate was enlarged the Rosario catheter has caused some bleeding from the prostate.  You should notice that the urine in the Rosario bag will likely in and then become clear yellow.  Take the prescribed Flomax every evening.  Make an appointment to see a urologist to monitor your symptoms. Follow up with your primary physician and your surgeon.  Return to the emergency department for fever, worsening blood in urine, urine no longer draining into Rosario bag or any new, worsening or concerning symptoms.

## 2019-01-29 NOTE — TELEPHONE ENCOUNTER
"S/p hernia repair earlier today.  Has drunk 10 bottles of water and he isn't urinating very much, and it's very painful to urinate.      Reason for Disposition   [1] Discomfort (pain, burning or stinging) when passing urine AND [2] male   [1] Unable to urinate (or only a few drops) > 4 hours AND     [2] bladder feels very full (e.g., feels blocked with strong urge to urinate; palpable bladder)    Answer Assessment - Initial Assessment Questions  1. SYMPTOM: "What's the main symptom you're concerned about?" (e.g., pain, fever, vomiting)      unable to urinate very much, ,and very painful to urinate  2. ONSET: "When did ________  start?"      This evening  3. SURGERY: "What surgery was performed?"      Hernia repair  4. DATE of SURGERY: "When was surgery performed?"       today  5. ANESTHESIA: " What type of anesthesia did you have?" (e.g., general, spinal, epidural, local)      general  6. PAIN: "Is there any pain?" If so, ask: "How bad is it?"  (Scale 1-10; or mild, moderate, severe)      severe  7. FEVER: "Do you have a fever?" If so, ask: "What is your temperature, how was it measured, and when did it start?"      na  8. VOMITING: "Is there any vomiting?" If yes, ask: "How many times?"      na  9. BLEEDING: "Is there any bleeding?" If so, ask: "How much?" and "Where?"      na  10. OTHER SYMPTOMS: "Do you have any other symptoms?" (e.g., drainage from wound, painful urination, constipation)        Na    Answer Assessment - Initial Assessment Questions  1. SEVERITY: "How bad is the pain?"  (e.g., Scale 1-10; mild, moderate, or severe)    - MILD (1-3): complains slightly about urination hurting    - MODERATE (4-7): interferes with normal activities      - SEVERE (8-10): excruciating, unwilling or unable to urinate because of the pain       severe  2. FREQUENCY: "How many times have you had painful urination today?"       Every time  3. PATTERN: "Is pain present every time you urinate or just sometimes?"       " "yes  4. ONSET: "When did the painful urination start?"       This afternoon  5. FEVER: "Do you have a fever?" If so, ask: "What is your temperature, how was it measured, and when did it start?"      na  6. PAST UTI: "Have you had a urine infection before?" If so, ask: "When was the last time?" and "What happened that time?"       na  7. CAUSE: "What do you think is causing the painful urination?"       Hernia repair today  8. OTHER SYMPTOMS: "Do you have any other symptoms?" (e.g., flank pain, penile discharge, scrotal pain, blood in urine)      Unable to urinate very much, despite drinking 10 bottles of water, and it's very painful    Protocols used: ST POST-OP SYMPTOMS AND [id number], ST URINATION PAIN - MALE-A-AH      "

## 2019-01-30 ENCOUNTER — OFFICE VISIT (OUTPATIENT)
Dept: SURGERY | Facility: CLINIC | Age: 74
End: 2019-01-30
Payer: MEDICARE

## 2019-01-30 VITALS
BODY MASS INDEX: 22.88 KG/M2 | WEIGHT: 134 LBS | HEIGHT: 64 IN | HEART RATE: 79 BPM | DIASTOLIC BLOOD PRESSURE: 75 MMHG | SYSTOLIC BLOOD PRESSURE: 125 MMHG

## 2019-01-30 DIAGNOSIS — K40.30 UNILATERAL INGUINAL HERNIA WITH OBSTRUCTION AND WITHOUT GANGRENE, RECURRENCE NOT SPECIFIED: Primary | ICD-10-CM

## 2019-01-30 PROCEDURE — 99024 POSTOP FOLLOW-UP VISIT: CPT | Mod: S$GLB,,, | Performed by: SURGERY

## 2019-01-30 PROCEDURE — 99024 PR POST-OP FOLLOW-UP VISIT: ICD-10-PCS | Mod: S$GLB,,, | Performed by: SURGERY

## 2019-02-20 ENCOUNTER — OFFICE VISIT (OUTPATIENT)
Dept: SURGERY | Facility: CLINIC | Age: 74
End: 2019-02-20
Payer: MEDICARE

## 2019-02-20 VITALS
WEIGHT: 134 LBS | BODY MASS INDEX: 22.88 KG/M2 | DIASTOLIC BLOOD PRESSURE: 82 MMHG | SYSTOLIC BLOOD PRESSURE: 132 MMHG | HEIGHT: 64 IN | HEART RATE: 76 BPM

## 2019-02-20 DIAGNOSIS — K40.30 UNILATERAL INGUINAL HERNIA WITH OBSTRUCTION AND WITHOUT GANGRENE, RECURRENCE NOT SPECIFIED: Primary | ICD-10-CM

## 2019-02-20 PROCEDURE — 99024 PR POST-OP FOLLOW-UP VISIT: ICD-10-PCS | Mod: S$GLB,,, | Performed by: SURGERY

## 2019-02-20 PROCEDURE — 99024 POSTOP FOLLOW-UP VISIT: CPT | Mod: S$GLB,,, | Performed by: SURGERY

## 2019-02-20 RX ORDER — TRAZODONE HYDROCHLORIDE 50 MG/1
TABLET ORAL
COMMUNITY
Start: 2019-01-25 | End: 2022-05-20 | Stop reason: CLARIF

## 2019-02-20 NOTE — PROGRESS NOTES
Subjective:       Patient ID: Jose Davis is a 73 y.o. male.    Chief Complaint: Post-op Evaluation    HPI 72 yo male s/p inguinal hernia repair with some residual discomfort  Review of Systems   Constitutional: Negative.    HENT: Negative.    Eyes: Negative.    Respiratory: Negative.    Cardiovascular: Negative.    Gastrointestinal: Negative.    Endocrine: Negative.    Musculoskeletal: Negative.    Skin: Negative.    Allergic/Immunologic: Negative.    Neurological: Negative.    Hematological: Negative.    Psychiatric/Behavioral: Negative.    All other systems reviewed and are negative.      Objective:      Physical Exam   Constitutional: He is oriented to person, place, and time. He appears well-developed and well-nourished.   HENT:   Head: Normocephalic and atraumatic.   Right Ear: External ear normal.   Left Ear: External ear normal.   Nose: Nose normal.   Mouth/Throat: Oropharynx is clear and moist.   Eyes: Conjunctivae and EOM are normal. Pupils are equal, round, and reactive to light.   Neck: Normal range of motion. Neck supple.   Cardiovascular: Normal rate, regular rhythm, normal heart sounds and intact distal pulses.   Pulmonary/Chest: Effort normal and breath sounds normal.   Abdominal: Soft. Bowel sounds are normal.   Genitourinary:         Musculoskeletal: Normal range of motion.   Neurological: He is alert and oriented to person, place, and time. He has normal reflexes.   Skin: Skin is warm and dry.   Psychiatric: He has a normal mood and affect. His behavior is normal. Thought content normal.   Vitals reviewed.      Assessment:       1. Unilateral inguinal hernia with obstruction and without gangrene, recurrence not specified      doing well  Plan:       I will see him prn

## 2019-03-25 ENCOUNTER — OCCUPATIONAL HEALTH (OUTPATIENT)
Dept: URGENT CARE | Facility: CLINIC | Age: 74
End: 2019-03-25
Payer: MEDICARE

## 2019-03-25 DIAGNOSIS — Z11.1 PPD SCREENING TEST: Primary | ICD-10-CM

## 2019-03-25 PROCEDURE — 86580 TB INTRADERMAL TEST: CPT | Mod: S$GLB,,, | Performed by: PREVENTIVE MEDICINE

## 2019-03-25 PROCEDURE — 86580 POCT TB SKIN TEST: ICD-10-PCS | Mod: S$GLB,,, | Performed by: PREVENTIVE MEDICINE

## 2019-03-27 LAB
TB INDURATION - 48 HR READ: 9 MM
TB INDURATION - 72 HR READ: NORMAL MM
TB SKIN TEST - 48 HR READ: NEGATIVE
TB SKIN TEST - 72 HR READ: NORMAL

## 2022-04-29 ENCOUNTER — TELEPHONE (OUTPATIENT)
Dept: NEUROSURGERY | Facility: CLINIC | Age: 77
End: 2022-04-29
Payer: MEDICARE

## 2022-04-29 NOTE — TELEPHONE ENCOUNTER
----- Message from Radha Castellon sent at 4/29/2022  1:10 PM CDT -----  Contact: Cathi  Urgent Type:  Sooner Appointment Request      Name of Caller:Case management Yecenia   When is the first available appointment?08/09/2022  Symptoms:CATHI STATED PATIENT BEEN DISCHARGE TODAY AND  SPOKE WITH DOCTOR ANANT ABOUT THIS PATIENT     Patient has a cervical spine mass per Cathi   Would the patient rather a call back or a response via [facility]? Call back   Best Call Back Number:[phone]  Additional Information: Please assist

## 2022-04-29 NOTE — TELEPHONE ENCOUNTER
Called x3 - left VM informing that I have rescheduled pt to Dr. Medina's soonest available (6/7). I also said that any imaging related to spinal mass needs to be brought on disk to appt. I offered Ayse's soonest available (5/18) if June is not soon enough.

## 2022-04-29 NOTE — TELEPHONE ENCOUNTER
Spoke with  and rescheduled to see Ayse person on 5/18. She confirmed patient will bring imaging on disk. She also said she will be faxing over relevant documents and imaging reports.

## 2022-05-02 ENCOUNTER — TELEPHONE (OUTPATIENT)
Dept: NEUROSURGERY | Facility: CLINIC | Age: 77
End: 2022-05-02
Payer: MEDICARE

## 2022-05-02 NOTE — TELEPHONE ENCOUNTER
Left message requesting a call back in regards to scheduling visit with Dr. Krueger as per Dr. Medina. Phone number provided

## 2022-05-16 NOTE — H&P (VIEW-ONLY)
Neurosurgery  History & Physical    SUBJECTIVE:     Chief Complaint: cervical spine mass    History of Present Illness:  75 yo male with history of HTN, Lupus, and Hep C who presents referred by Dr. Johns at [place] for evaluation of cervical spine mass. He presented to [facility] 3 weeks ago with complaint of dizziness that started upon waking. During the workup for this dizziness, CT cervical spine was obtained which revealed a C5 expansile vertebral body mass was found with extension into the pedicle and the facet on the right. MRI cervical spine was obtained W WO contrast which confirmed enhancing mass lesion and he was transferred to [place] for NSGY evaluation. The work-up was completed with MRI brain as well as CT c/a/p which were both negative. CTA was negative for right vertebral involvement. No emergent intervention was indicated and he was discharged with close outpatient follow up for evaluation here at [facility] due to the complexity of the case.     The patient reports the dizziness he was experiencing prior to his admission was attributed to vertigo and this has significantly improved since his discharge. He does note feeling off balance when he walks and is now using two canes to steady himself. He reports right sided neck stiffness as well as intermittent numbness sensations in his bilateral hands for the past several months but cannot localize this. He denies shooting pain into his arms, new weakness, or difficulty with dexterity. He denies significant weight loss. His mother had GI cancer. He is a nonsmoker. He takes ASA 81mg as a preventative measure.    Review of patient's allergies indicates:  No Known Allergies    Current Outpatient Medications   Medication Sig Dispense Refill    amLODIPine (NORVASC) 5 MG tablet Take 5 mg by mouth once daily.      aspirin (ECOTRIN) 81 MG EC tablet Take 81 mg by mouth once daily.      docusate sodium (COLACE) 100 MG capsule Take 1  capsule (100 mg total) by mouth 2 (two) times daily. Use while taking oxycodone (Patient not taking: Reported on 10/19/2020) 30 capsule 0    gabapentin (NEURONTIN) 100 MG capsule TAKE 1 CAPSULE BY MOUTH EVERY EVENING AS NEEDED      hydrocortisone 2.5 % cream APPLY TO AFFECTED AREA TWICE A DAY 28.35 g 3    hydrocortisone-pramoxine (ANALPRAM-HC) 2.5-1 % Crea INSERT BY RECTUM 3 TIMES A DAY AS NEEDED HEMORRHOIDS      ibuprofen/diphenhydramine cit (ADVIL PM ORAL) Take by mouth.      rosuvastatin (CRESTOR) 20 MG tablet Take 20 mg by mouth once daily.      traZODone (DESYREL) 50 MG tablet        No current facility-administered medications for this visit.       Past Medical History:   Diagnosis Date    Enlarged prostate     Hypertension      Past Surgical History:   Procedure Laterality Date    cyst excision from neck      cystoscope      HERNIA REPAIR      nephrectomy  2006    left for a tumor?    PROSTATE SURGERY      ROTATOR CUFF REPAIR      right    TONSILLECTOMY       Family History     Problem Relation (Age of Onset)    COPD Father    Cancer Mother    Heart disease Father        Social History     Socioeconomic History    Marital status:    Tobacco Use    Smoking status: Never Smoker    Smokeless tobacco: Never Used   Substance and Sexual Activity    Alcohol use: No     Alcohol/week: 0.0 standard drinks    Drug use: No       Review of Systems   Constitutional: Negative for activity change, diaphoresis, fatigue, fever and unexpected weight change.   Eyes: Negative for visual disturbance.   Respiratory: Negative for cough, shortness of breath and wheezing.    Cardiovascular: Negative for chest pain and palpitations.   Gastrointestinal: Negative for abdominal distention, abdominal pain, blood in stool, constipation, diarrhea, nausea and vomiting.   Genitourinary: Negative for difficulty urinating, enuresis, frequency and urgency.   Musculoskeletal: Positive for gait problem and neck stiffness.  Negative for back pain, joint swelling and myalgias.   Neurological: Positive for dizziness and numbness. Negative for seizures, facial asymmetry, speech difficulty, weakness, light-headedness and headaches.   Hematological: Does not bruise/bleed easily.   Psychiatric/Behavioral: Negative for dysphoric mood and hallucinations. The patient is not nervous/anxious.        OBJECTIVE:     Vital Signs     There is no height or weight on file to calculate BMI.      Neurosurgery Physical Exam  General: well developed, well nourished, no distress.   Head: normocephalic, atraumatic  Neurologic: Alert and oriented. Thought content appropriate.  GCS: Motor: 6/Verbal: 5/Eyes: 4 GCS Total: 15  Mental Status: Awake, Alert, Oriented x3  Cranial nerves: face symmetric, tongue midline, CN II-XII grossly intact.   Eyes: pupils equal, round, reactive to light with accomodation, EOMI.   Sensory: intact to light touch throughout  Motor Strength:Moves all extremities spontaneously with good tone.  Full strength upper and lower extremities. No abnormal movements seen.     Strength  Deltoids Triceps Biceps Wrist Extension Wrist Flexion Hand    Upper: R 5/5 5/5 5/5 5/5 5/5 5/5    L 5/5 5/5 5/5 5/5 5/5 5/5     Iliopsoas Quadriceps Knee  Flexion Tibialis  anterior Gastro- cnemius EHL   Lower: R 5/5 5/5 5/5 5/5 5/5 5/5    L 5/5 5/5 5/5 5/5 5/5 5/5     DTR's - 3 + LUE, 2+ and symmetric in LLE, RUE/RLE  Pronator Drift: no drift noted  Finger-to-nose: Intact bilaterally  Valentin: positive on the left   Clonus: absent  Babinski: absent  Pulses: 2+ and symmetric radial and dorsalis pedis. No lower extremity edema  Gait: wide based, walks with 2 canes  Tandem Gait: unable to perform         Cervical ROM: full         Diagnostic Results:  Outside imaging reviewed:  MRI cervical spine W WO contrast is a poor quality study. There appears to be enhancing C5 vertebral body mas with extension into the pedicle and the facet on the right as well as  extension into the spinal canal asn epidural space causing mild-mod stenosis at this level. There is fracture through the C5 vertebral body     CT cervical spine reviewed and confirms above. There is no other fracture or dislocation in the cervical spine    CTA neck without vertebral artery involvement bilaterally    MRI Brain without acute intracranial process    CT chest abdomen and pelvis without evidence for primary neoplasm.    ASSESSMENT/PLAN:     77 yo male with history of HTN, Lupus, and Hep C found to have C5 mass on MRI Cervical spine that extends into the articular pillar on the right. Dr. Medina and I reviewed the Imaging with the patient today. We will plan for surgery next week 5/23/22 for C5 corpectomy with C4-6 fusion for resection and biopsy of cervical mass lesion. He will be direct admitted the day before to obtain sufficient MRI pan spine W WO contrast as well as pre-op testing. Dr. Medina  explained all the attendant risks, benefits and potential complications as well as alternatives of the procedure in detail. The patient voiced understanding and all questions were answered. The patient agrees to proceed as planned.    All symptoms and signs that require emergent or urgent treatment were reviewed and he voiced understanding. I encouraged the pt and his wife to call with any further questions prior to surgery.       Ayse Real PA-C  Neurosurgery              Note dictated with voice recognition software, please excuse any grammatical errors.

## 2022-05-16 NOTE — PROGRESS NOTES
Neurosurgery  History & Physical    SUBJECTIVE:     Chief Complaint: cervical spine mass    History of Present Illness:  75 yo male with history of HTN, Lupus, and Hep C who presents referred by Dr. Johns at [place] for evaluation of cervical spine mass. He presented to [facility] 3 weeks ago with complaint of dizziness that started upon waking. During the workup for this dizziness, CT cervical spine was obtained which revealed a C5 expansile vertebral body mass was found with extension into the pedicle and the facet on the right. MRI cervical spine was obtained W WO contrast which confirmed enhancing mass lesion and he was transferred to [place] for NSGY evaluation. The work-up was completed with MRI brain as well as CT c/a/p which were both negative. CTA was negative for right vertebral involvement. No emergent intervention was indicated and he was discharged with close outpatient follow up for evaluation here at [facility] due to the complexity of the case.     The patient reports the dizziness he was experiencing prior to his admission was attributed to vertigo and this has significantly improved since his discharge. He does note feeling off balance when he walks and is now using two canes to steady himself. He reports right sided neck stiffness as well as intermittent numbness sensations in his bilateral hands for the past several months but cannot localize this. He denies shooting pain into his arms, new weakness, or difficulty with dexterity. He denies significant weight loss. His mother had GI cancer. He is a nonsmoker. He takes ASA 81mg as a preventative measure.    Review of patient's allergies indicates:  No Known Allergies    Current Outpatient Medications   Medication Sig Dispense Refill    amLODIPine (NORVASC) 5 MG tablet Take 5 mg by mouth once daily.      aspirin (ECOTRIN) 81 MG EC tablet Take 81 mg by mouth once daily.      docusate sodium (COLACE) 100 MG capsule Take 1  capsule (100 mg total) by mouth 2 (two) times daily. Use while taking oxycodone (Patient not taking: Reported on 10/19/2020) 30 capsule 0    gabapentin (NEURONTIN) 100 MG capsule TAKE 1 CAPSULE BY MOUTH EVERY EVENING AS NEEDED      hydrocortisone 2.5 % cream APPLY TO AFFECTED AREA TWICE A DAY 28.35 g 3    hydrocortisone-pramoxine (ANALPRAM-HC) 2.5-1 % Crea INSERT BY RECTUM 3 TIMES A DAY AS NEEDED HEMORRHOIDS      ibuprofen/diphenhydramine cit (ADVIL PM ORAL) Take by mouth.      rosuvastatin (CRESTOR) 20 MG tablet Take 20 mg by mouth once daily.      traZODone (DESYREL) 50 MG tablet        No current facility-administered medications for this visit.       Past Medical History:   Diagnosis Date    Enlarged prostate     Hypertension      Past Surgical History:   Procedure Laterality Date    cyst excision from neck      cystoscope      HERNIA REPAIR      nephrectomy  2006    left for a tumor?    PROSTATE SURGERY      ROTATOR CUFF REPAIR      right    TONSILLECTOMY       Family History     Problem Relation (Age of Onset)    COPD Father    Cancer Mother    Heart disease Father        Social History     Socioeconomic History    Marital status:    Tobacco Use    Smoking status: Never Smoker    Smokeless tobacco: Never Used   Substance and Sexual Activity    Alcohol use: No     Alcohol/week: 0.0 standard drinks    Drug use: No       Review of Systems   Constitutional: Negative for activity change, diaphoresis, fatigue, fever and unexpected weight change.   Eyes: Negative for visual disturbance.   Respiratory: Negative for cough, shortness of breath and wheezing.    Cardiovascular: Negative for chest pain and palpitations.   Gastrointestinal: Negative for abdominal distention, abdominal pain, blood in stool, constipation, diarrhea, nausea and vomiting.   Genitourinary: Negative for difficulty urinating, enuresis, frequency and urgency.   Musculoskeletal: Positive for gait problem and neck stiffness.  Negative for back pain, joint swelling and myalgias.   Neurological: Positive for dizziness and numbness. Negative for seizures, facial asymmetry, speech difficulty, weakness, light-headedness and headaches.   Hematological: Does not bruise/bleed easily.   Psychiatric/Behavioral: Negative for dysphoric mood and hallucinations. The patient is not nervous/anxious.        OBJECTIVE:     Vital Signs     There is no height or weight on file to calculate BMI.      Neurosurgery Physical Exam  General: well developed, well nourished, no distress.   Head: normocephalic, atraumatic  Neurologic: Alert and oriented. Thought content appropriate.  GCS: Motor: 6/Verbal: 5/Eyes: 4 GCS Total: 15  Mental Status: Awake, Alert, Oriented x3  Cranial nerves: face symmetric, tongue midline, CN II-XII grossly intact.   Eyes: pupils equal, round, reactive to light with accomodation, EOMI.   Sensory: intact to light touch throughout  Motor Strength:Moves all extremities spontaneously with good tone.  Full strength upper and lower extremities. No abnormal movements seen.     Strength  Deltoids Triceps Biceps Wrist Extension Wrist Flexion Hand    Upper: R 5/5 5/5 5/5 5/5 5/5 5/5    L 5/5 5/5 5/5 5/5 5/5 5/5     Iliopsoas Quadriceps Knee  Flexion Tibialis  anterior Gastro- cnemius EHL   Lower: R 5/5 5/5 5/5 5/5 5/5 5/5    L 5/5 5/5 5/5 5/5 5/5 5/5     DTR's - 3 + LUE, 2+ and symmetric in LLE, RUE/RLE  Pronator Drift: no drift noted  Finger-to-nose: Intact bilaterally  Valentin: positive on the left   Clonus: absent  Babinski: absent  Pulses: 2+ and symmetric radial and dorsalis pedis. No lower extremity edema  Gait: wide based, walks with 2 canes  Tandem Gait: unable to perform         Cervical ROM: full         Diagnostic Results:  Outside imaging reviewed:  MRI cervical spine W WO contrast is a poor quality study. There appears to be enhancing C5 vertebral body mas with extension into the pedicle and the facet on the right as well as  extension into the spinal canal asn epidural space causing mild-mod stenosis at this level. There is fracture through the C5 vertebral body     CT cervical spine reviewed and confirms above. There is no other fracture or dislocation in the cervical spine    CTA neck without vertebral artery involvement bilaterally    MRI Brain without acute intracranial process    CT chest abdomen and pelvis without evidence for primary neoplasm.    ASSESSMENT/PLAN:     75 yo male with history of HTN, Lupus, and Hep C found to have C5 mass on MRI Cervical spine that extends into the articular pillar on the right. Dr. Medina and I reviewed the Imaging with the patient today. We will plan for surgery next week 5/23/22 for C5 corpectomy with C4-6 fusion for resection and biopsy of cervical mass lesion. He will be direct admitted the day before to obtain sufficient MRI pan spine W WO contrast as well as pre-op testing. Dr. Medina  explained all the attendant risks, benefits and potential complications as well as alternatives of the procedure in detail. The patient voiced understanding and all questions were answered. The patient agrees to proceed as planned.    All symptoms and signs that require emergent or urgent treatment were reviewed and he voiced understanding. I encouraged the pt and his wife to call with any further questions prior to surgery.       Ayse Real PA-C  Neurosurgery              Note dictated with voice recognition software, please excuse any grammatical errors.

## 2022-05-18 ENCOUNTER — OFFICE VISIT (OUTPATIENT)
Dept: NEUROSURGERY | Facility: CLINIC | Age: 77
End: 2022-05-18
Payer: MEDICARE

## 2022-05-18 DIAGNOSIS — G95.89 CERVICAL SPINAL MASS: Primary | ICD-10-CM

## 2022-05-18 PROCEDURE — 3288F PR FALLS RISK ASSESSMENT DOCUMENTED: ICD-10-PCS | Mod: CPTII,S$GLB,, | Performed by: PHYSICIAN ASSISTANT

## 2022-05-18 PROCEDURE — 1125F PR PAIN SEVERITY QUANTIFIED, PAIN PRESENT: ICD-10-PCS | Mod: CPTII,S$GLB,, | Performed by: PHYSICIAN ASSISTANT

## 2022-05-18 PROCEDURE — 1101F PR PT FALLS ASSESS DOC 0-1 FALLS W/OUT INJ PAST YR: ICD-10-PCS | Mod: CPTII,S$GLB,, | Performed by: PHYSICIAN ASSISTANT

## 2022-05-18 PROCEDURE — 99999 PR PBB SHADOW E&M-EST. PATIENT-LVL III: CPT | Mod: PBBFAC,,, | Performed by: PHYSICIAN ASSISTANT

## 2022-05-18 PROCEDURE — 3288F FALL RISK ASSESSMENT DOCD: CPT | Mod: CPTII,S$GLB,, | Performed by: PHYSICIAN ASSISTANT

## 2022-05-18 PROCEDURE — 99205 PR OFFICE/OUTPT VISIT, NEW, LEVL V, 60-74 MIN: ICD-10-PCS | Mod: S$GLB,,, | Performed by: PHYSICIAN ASSISTANT

## 2022-05-18 PROCEDURE — 1159F PR MEDICATION LIST DOCUMENTED IN MEDICAL RECORD: ICD-10-PCS | Mod: CPTII,S$GLB,, | Performed by: PHYSICIAN ASSISTANT

## 2022-05-18 PROCEDURE — 1159F MED LIST DOCD IN RCRD: CPT | Mod: CPTII,S$GLB,, | Performed by: PHYSICIAN ASSISTANT

## 2022-05-18 PROCEDURE — 99205 OFFICE O/P NEW HI 60 MIN: CPT | Mod: S$GLB,,, | Performed by: PHYSICIAN ASSISTANT

## 2022-05-18 PROCEDURE — 1125F AMNT PAIN NOTED PAIN PRSNT: CPT | Mod: CPTII,S$GLB,, | Performed by: PHYSICIAN ASSISTANT

## 2022-05-18 PROCEDURE — 99999 PR PBB SHADOW E&M-EST. PATIENT-LVL III: ICD-10-PCS | Mod: PBBFAC,,, | Performed by: PHYSICIAN ASSISTANT

## 2022-05-18 PROCEDURE — 1101F PT FALLS ASSESS-DOCD LE1/YR: CPT | Mod: CPTII,S$GLB,, | Performed by: PHYSICIAN ASSISTANT

## 2022-05-18 RX ORDER — AMLODIPINE BESYLATE 10 MG/1
10 TABLET ORAL EVERY MORNING
COMMUNITY

## 2022-05-18 RX ORDER — TRAZODONE HYDROCHLORIDE 50 MG/1
50 TABLET ORAL NIGHTLY
COMMUNITY

## 2022-05-18 RX ORDER — DONEPEZIL HYDROCHLORIDE 10 MG/1
10 TABLET, FILM COATED ORAL EVERY MORNING
COMMUNITY

## 2022-05-18 RX ORDER — GABAPENTIN 100 MG/1
200 CAPSULE ORAL NIGHTLY
Status: ON HOLD | COMMUNITY
End: 2022-05-26 | Stop reason: SDUPTHER

## 2022-05-19 ENCOUNTER — TELEPHONE (OUTPATIENT)
Dept: NEUROSURGERY | Facility: CLINIC | Age: 77
End: 2022-05-19
Payer: MEDICARE

## 2022-05-19 NOTE — TELEPHONE ENCOUNTER
Spoke to Mr Davis , notified to arrive Monday morning for 5 am  to 2nd floor DOS for surgery. Advised NPO after midnight the night before  procedure , bathe w/ Hibiclens or dial from neck down the night before and morning of  surgery.. Patient verbalized acknowledgement        Spoke to Sherice Barron to arrange a lyft ride for the patient to be here for 5 am . She said she will give the patient a call

## 2022-05-20 NOTE — PRE-PROCEDURE INSTRUCTIONS
PreOp Instructions given:   - Verbal medication information (what to hold and what to take)   - NPO guidelines 2300  - Arrival place directions given; time to be given the day before procedure by the   Surgeon's Office 0500 DOSC   - Bathing with antibacterial soap   - Don't wear any jewelry or bring any valuables AM of surgery   - No makeup or moisturizer to face   - No perfume/cologne, powder, lotions or aftershave   Pt. verbalized understanding.   Pt denies any h/o Anesthesia/Sedation complications or side effects.

## 2022-05-22 ENCOUNTER — ANESTHESIA EVENT (OUTPATIENT)
Dept: SURGERY | Facility: HOSPITAL | Age: 77
DRG: 472 | End: 2022-05-22
Payer: MEDICARE

## 2022-05-23 ENCOUNTER — TELEPHONE (OUTPATIENT)
Dept: NEUROSURGERY | Facility: CLINIC | Age: 77
End: 2022-05-23
Payer: MEDICARE

## 2022-05-23 ENCOUNTER — HOSPITAL ENCOUNTER (INPATIENT)
Facility: HOSPITAL | Age: 77
LOS: 4 days | Discharge: HOME HEALTH | DRG: 472 | End: 2022-05-27
Attending: NEUROLOGICAL SURGERY | Admitting: STUDENT IN AN ORGANIZED HEALTH CARE EDUCATION/TRAINING PROGRAM
Payer: MEDICARE

## 2022-05-23 ENCOUNTER — ANESTHESIA (OUTPATIENT)
Dept: SURGERY | Facility: HOSPITAL | Age: 77
DRG: 472 | End: 2022-05-23
Payer: MEDICARE

## 2022-05-23 DIAGNOSIS — G95.89 CERVICAL SPINAL MASS: Primary | ICD-10-CM

## 2022-05-23 LAB
ALBUMIN SERPL BCP-MCNC: 3.7 G/DL (ref 3.5–5.2)
ALP SERPL-CCNC: 72 U/L (ref 55–135)
ALT SERPL W/O P-5'-P-CCNC: 22 U/L (ref 10–44)
ANION GAP SERPL CALC-SCNC: 9 MMOL/L (ref 8–16)
APTT BLDCRRT: 38.7 SEC (ref 21–32)
AST SERPL-CCNC: 20 U/L (ref 10–40)
BASOPHILS # BLD AUTO: 0.03 K/UL (ref 0–0.2)
BASOPHILS NFR BLD: 0.5 % (ref 0–1.9)
BILIRUB SERPL-MCNC: 0.9 MG/DL (ref 0.1–1)
BUN SERPL-MCNC: 18 MG/DL (ref 8–23)
CALCIUM SERPL-MCNC: 9.3 MG/DL (ref 8.7–10.5)
CHLORIDE SERPL-SCNC: 105 MMOL/L (ref 95–110)
CO2 SERPL-SCNC: 26 MMOL/L (ref 23–29)
CREAT SERPL-MCNC: 1.2 MG/DL (ref 0.5–1.4)
DIFFERENTIAL METHOD: ABNORMAL
EOSINOPHIL # BLD AUTO: 0.2 K/UL (ref 0–0.5)
EOSINOPHIL NFR BLD: 3 % (ref 0–8)
ERYTHROCYTE [DISTWIDTH] IN BLOOD BY AUTOMATED COUNT: 12.3 % (ref 11.5–14.5)
EST. GFR  (AFRICAN AMERICAN): >60 ML/MIN/1.73 M^2
EST. GFR  (NON AFRICAN AMERICAN): 58.4 ML/MIN/1.73 M^2
GLUCOSE SERPL-MCNC: 84 MG/DL (ref 70–110)
HCT VFR BLD AUTO: 38.6 % (ref 40–54)
HGB BLD-MCNC: 12.6 G/DL (ref 14–18)
IMM GRANULOCYTES # BLD AUTO: 0.02 K/UL (ref 0–0.04)
IMM GRANULOCYTES NFR BLD AUTO: 0.3 % (ref 0–0.5)
INR PPP: 1.1 (ref 0.8–1.2)
LYMPHOCYTES # BLD AUTO: 1.6 K/UL (ref 1–4.8)
LYMPHOCYTES NFR BLD: 25.8 % (ref 18–48)
MCH RBC QN AUTO: 29.2 PG (ref 27–31)
MCHC RBC AUTO-ENTMCNC: 32.6 G/DL (ref 32–36)
MCV RBC AUTO: 89 FL (ref 82–98)
MONOCYTES # BLD AUTO: 0.5 K/UL (ref 0.3–1)
MONOCYTES NFR BLD: 8.2 % (ref 4–15)
NEUTROPHILS # BLD AUTO: 3.9 K/UL (ref 1.8–7.7)
NEUTROPHILS NFR BLD: 62.2 % (ref 38–73)
NRBC BLD-RTO: 0 /100 WBC
PLATELET # BLD AUTO: 194 K/UL (ref 150–450)
PMV BLD AUTO: 10.4 FL (ref 9.2–12.9)
POTASSIUM SERPL-SCNC: 3.6 MMOL/L (ref 3.5–5.1)
PROT SERPL-MCNC: 6.6 G/DL (ref 6–8.4)
PROTHROMBIN TIME: 11.4 SEC (ref 9–12.5)
RBC # BLD AUTO: 4.32 M/UL (ref 4.6–6.2)
SODIUM SERPL-SCNC: 140 MMOL/L (ref 136–145)
WBC # BLD AUTO: 6.31 K/UL (ref 3.9–12.7)

## 2022-05-23 PROCEDURE — 86850 RBC ANTIBODY SCREEN: CPT | Mod: 91 | Performed by: STUDENT IN AN ORGANIZED HEALTH CARE EDUCATION/TRAINING PROGRAM

## 2022-05-23 PROCEDURE — 85730 THROMBOPLASTIN TIME PARTIAL: CPT | Performed by: STUDENT IN AN ORGANIZED HEALTH CARE EDUCATION/TRAINING PROGRAM

## 2022-05-23 PROCEDURE — 36415 COLL VENOUS BLD VENIPUNCTURE: CPT | Performed by: STUDENT IN AN ORGANIZED HEALTH CARE EDUCATION/TRAINING PROGRAM

## 2022-05-23 PROCEDURE — 85610 PROTHROMBIN TIME: CPT | Performed by: STUDENT IN AN ORGANIZED HEALTH CARE EDUCATION/TRAINING PROGRAM

## 2022-05-23 PROCEDURE — 86901 BLOOD TYPING SEROLOGIC RH(D): CPT | Performed by: STUDENT IN AN ORGANIZED HEALTH CARE EDUCATION/TRAINING PROGRAM

## 2022-05-23 PROCEDURE — 80053 COMPREHEN METABOLIC PANEL: CPT | Performed by: STUDENT IN AN ORGANIZED HEALTH CARE EDUCATION/TRAINING PROGRAM

## 2022-05-23 PROCEDURE — 85025 COMPLETE CBC W/AUTO DIFF WBC: CPT | Performed by: STUDENT IN AN ORGANIZED HEALTH CARE EDUCATION/TRAINING PROGRAM

## 2022-05-23 PROCEDURE — 25000003 PHARM REV CODE 250: Performed by: STUDENT IN AN ORGANIZED HEALTH CARE EDUCATION/TRAINING PROGRAM

## 2022-05-23 PROCEDURE — 86922 COMPATIBILITY TEST ANTIGLOB: CPT | Performed by: STUDENT IN AN ORGANIZED HEALTH CARE EDUCATION/TRAINING PROGRAM

## 2022-05-23 PROCEDURE — 86870 RBC ANTIBODY IDENTIFICATION: CPT | Performed by: STUDENT IN AN ORGANIZED HEALTH CARE EDUCATION/TRAINING PROGRAM

## 2022-05-23 PROCEDURE — 20600001 HC STEP DOWN PRIVATE ROOM

## 2022-05-23 RX ORDER — TRAZODONE HYDROCHLORIDE 50 MG/1
50 TABLET ORAL NIGHTLY
Status: DISCONTINUED | OUTPATIENT
Start: 2022-05-23 | End: 2022-05-27 | Stop reason: HOSPADM

## 2022-05-23 RX ORDER — HYDROMORPHONE HYDROCHLORIDE 1 MG/ML
1 INJECTION, SOLUTION INTRAMUSCULAR; INTRAVENOUS; SUBCUTANEOUS EVERY 6 HOURS PRN
Status: DISCONTINUED | OUTPATIENT
Start: 2022-05-23 | End: 2022-05-27 | Stop reason: HOSPADM

## 2022-05-23 RX ORDER — POLYETHYLENE GLYCOL 3350 17 G/17G
17 POWDER, FOR SOLUTION ORAL DAILY
Status: DISCONTINUED | OUTPATIENT
Start: 2022-05-23 | End: 2022-05-27 | Stop reason: HOSPADM

## 2022-05-23 RX ORDER — GADOBUTROL 604.72 MG/ML
6 INJECTION INTRAVENOUS
Status: COMPLETED | OUTPATIENT
Start: 2022-05-24 | End: 2022-05-23

## 2022-05-23 RX ORDER — IBUPROFEN 200 MG
24 TABLET ORAL
Status: DISCONTINUED | OUTPATIENT
Start: 2022-05-23 | End: 2022-05-27 | Stop reason: HOSPADM

## 2022-05-23 RX ORDER — IBUPROFEN 200 MG
16 TABLET ORAL
Status: DISCONTINUED | OUTPATIENT
Start: 2022-05-23 | End: 2022-05-27 | Stop reason: HOSPADM

## 2022-05-23 RX ORDER — AMLODIPINE BESYLATE 10 MG/1
10 TABLET ORAL DAILY
Status: DISCONTINUED | OUTPATIENT
Start: 2022-05-24 | End: 2022-05-27 | Stop reason: HOSPADM

## 2022-05-23 RX ORDER — TALC
6 POWDER (GRAM) TOPICAL NIGHTLY PRN
Status: DISCONTINUED | OUTPATIENT
Start: 2022-05-23 | End: 2022-05-27 | Stop reason: HOSPADM

## 2022-05-23 RX ORDER — MAG HYDROX/ALUMINUM HYD/SIMETH 200-200-20
30 SUSPENSION, ORAL (FINAL DOSE FORM) ORAL EVERY 4 HOURS PRN
Status: DISCONTINUED | OUTPATIENT
Start: 2022-05-23 | End: 2022-05-24 | Stop reason: SDUPTHER

## 2022-05-23 RX ORDER — DOCUSATE SODIUM 100 MG/1
100 CAPSULE, LIQUID FILLED ORAL 2 TIMES DAILY
Status: DISCONTINUED | OUTPATIENT
Start: 2022-05-23 | End: 2022-05-27 | Stop reason: HOSPADM

## 2022-05-23 RX ORDER — GABAPENTIN 100 MG/1
200 CAPSULE ORAL NIGHTLY
Status: DISCONTINUED | OUTPATIENT
Start: 2022-05-23 | End: 2022-05-25

## 2022-05-23 RX ORDER — OXYCODONE AND ACETAMINOPHEN 5; 325 MG/1; MG/1
1 TABLET ORAL EVERY 4 HOURS PRN
Status: DISCONTINUED | OUTPATIENT
Start: 2022-05-23 | End: 2022-05-27 | Stop reason: HOSPADM

## 2022-05-23 RX ORDER — ONDANSETRON 2 MG/ML
4 INJECTION INTRAMUSCULAR; INTRAVENOUS EVERY 6 HOURS PRN
Status: DISCONTINUED | OUTPATIENT
Start: 2022-05-23 | End: 2022-05-27 | Stop reason: HOSPADM

## 2022-05-23 RX ORDER — GLUCAGON 1 MG
1 KIT INJECTION
Status: DISCONTINUED | OUTPATIENT
Start: 2022-05-23 | End: 2022-05-27 | Stop reason: HOSPADM

## 2022-05-23 RX ORDER — SODIUM CHLORIDE 9 MG/ML
INJECTION, SOLUTION INTRAVENOUS CONTINUOUS
Status: DISCONTINUED | OUTPATIENT
Start: 2022-05-24 | End: 2022-05-25

## 2022-05-23 RX ORDER — ATORVASTATIN CALCIUM 20 MG/1
80 TABLET, FILM COATED ORAL DAILY
Status: DISCONTINUED | OUTPATIENT
Start: 2022-05-24 | End: 2022-05-27 | Stop reason: HOSPADM

## 2022-05-23 RX ORDER — DONEPEZIL HYDROCHLORIDE 5 MG/1
10 TABLET, FILM COATED ORAL DAILY
Status: DISCONTINUED | OUTPATIENT
Start: 2022-05-24 | End: 2022-05-27 | Stop reason: HOSPADM

## 2022-05-23 RX ADMIN — SODIUM CHLORIDE: 0.9 INJECTION, SOLUTION INTRAVENOUS at 10:05

## 2022-05-23 RX ADMIN — GADOBUTROL 6 ML: 604.72 INJECTION INTRAVENOUS at 11:05

## 2022-05-23 RX ADMIN — GABAPENTIN 200 MG: 100 CAPSULE ORAL at 09:05

## 2022-05-23 RX ADMIN — TRAZODONE HYDROCHLORIDE 50 MG: 50 TABLET ORAL at 09:05

## 2022-05-23 RX ADMIN — DOCUSATE SODIUM 100 MG: 100 CAPSULE ORAL at 09:05

## 2022-05-23 NOTE — TELEPHONE ENCOUNTER
----- Message from Cristina Rodriguez sent at 5/23/2022 11:50 AM CDT -----  Regarding: pt  Pt is calling to speak with the nurse about picking him up pt is asking to speak with Essence pt said there is some kind of confusion going on. Can you please call pt at [phone].    FIDENCIO

## 2022-05-24 DIAGNOSIS — Z98.1 S/P CERVICAL SPINAL FUSION: Primary | ICD-10-CM

## 2022-05-24 DIAGNOSIS — G95.89 CERVICAL SPINAL MASS: ICD-10-CM

## 2022-05-24 LAB
ABO + RH BLD: ABNORMAL
ANION GAP SERPL CALC-SCNC: 7 MMOL/L (ref 8–16)
APTT BLDCRRT: 38.8 SEC (ref 21–32)
BASOPHILS # BLD AUTO: 0.02 K/UL (ref 0–0.2)
BASOPHILS NFR BLD: 0.3 % (ref 0–1.9)
BLD GP AB SCN CELLS X3 SERPL QL: ABNORMAL
BLD GP AB SCN CELLS X3 SERPL QL: ABNORMAL
BLOOD GROUP ANTIBODIES SERPL: NORMAL
BUN SERPL-MCNC: 18 MG/DL (ref 8–23)
CALCIUM SERPL-MCNC: 9.4 MG/DL (ref 8.7–10.5)
CHLORIDE SERPL-SCNC: 108 MMOL/L (ref 95–110)
CO2 SERPL-SCNC: 26 MMOL/L (ref 23–29)
CREAT SERPL-MCNC: 1.1 MG/DL (ref 0.5–1.4)
DIFFERENTIAL METHOD: ABNORMAL
EOSINOPHIL # BLD AUTO: 0.3 K/UL (ref 0–0.5)
EOSINOPHIL NFR BLD: 3.9 % (ref 0–8)
ERYTHROCYTE [DISTWIDTH] IN BLOOD BY AUTOMATED COUNT: 12.1 % (ref 11.5–14.5)
EST. GFR  (AFRICAN AMERICAN): >60 ML/MIN/1.73 M^2
EST. GFR  (NON AFRICAN AMERICAN): >60 ML/MIN/1.73 M^2
GLUCOSE SERPL-MCNC: 83 MG/DL (ref 70–110)
GLUCOSE SERPL-MCNC: 92 MG/DL (ref 70–110)
GRAM STN SPEC: NORMAL
GRAM STN SPEC: NORMAL
HCO3 UR-SCNC: 22.1 MMOL/L (ref 24–28)
HCT VFR BLD AUTO: 41 % (ref 40–54)
HCT VFR BLD CALC: 34 %PCV (ref 36–54)
HGB BLD-MCNC: 13.7 G/DL (ref 14–18)
IMM GRANULOCYTES # BLD AUTO: 0.02 K/UL (ref 0–0.04)
IMM GRANULOCYTES NFR BLD AUTO: 0.3 % (ref 0–0.5)
LYMPHOCYTES # BLD AUTO: 1.3 K/UL (ref 1–4.8)
LYMPHOCYTES NFR BLD: 18.6 % (ref 18–48)
MCH RBC QN AUTO: 29.7 PG (ref 27–31)
MCHC RBC AUTO-ENTMCNC: 33.4 G/DL (ref 32–36)
MCV RBC AUTO: 89 FL (ref 82–98)
MONOCYTES # BLD AUTO: 0.6 K/UL (ref 0.3–1)
MONOCYTES NFR BLD: 8.5 % (ref 4–15)
NEUTROPHILS # BLD AUTO: 4.7 K/UL (ref 1.8–7.7)
NEUTROPHILS NFR BLD: 68.4 % (ref 38–73)
NRBC BLD-RTO: 0 /100 WBC
PCO2 BLDA: 37.8 MMHG (ref 35–45)
PH SMN: 7.37 [PH] (ref 7.35–7.45)
PLATELET # BLD AUTO: 190 K/UL (ref 150–450)
PMV BLD AUTO: 10.5 FL (ref 9.2–12.9)
PO2 BLDA: 284 MMHG (ref 80–100)
POC BE: -3 MMOL/L
POC IONIZED CALCIUM: 1.19 MMOL/L (ref 1.06–1.42)
POC SATURATED O2: 100 % (ref 95–100)
POC TCO2: 23 MMOL/L (ref 23–27)
POTASSIUM BLD-SCNC: 3.5 MMOL/L (ref 3.5–5.1)
POTASSIUM SERPL-SCNC: 4.2 MMOL/L (ref 3.5–5.1)
RBC # BLD AUTO: 4.61 M/UL (ref 4.6–6.2)
SAMPLE: ABNORMAL
SARS-COV-2 RNA RESP QL NAA+PROBE: NOT DETECTED
SODIUM BLD-SCNC: 141 MMOL/L (ref 136–145)
SODIUM SERPL-SCNC: 141 MMOL/L (ref 136–145)
WBC # BLD AUTO: 6.93 K/UL (ref 3.9–12.7)

## 2022-05-24 PROCEDURE — 22585 ARTHRD ANT NTRBD MIN DSC EA: CPT | Mod: ,,, | Performed by: NEUROLOGICAL SURGERY

## 2022-05-24 PROCEDURE — 37000008 HC ANESTHESIA 1ST 15 MINUTES: Performed by: NEUROLOGICAL SURGERY

## 2022-05-24 PROCEDURE — 88305 TISSUE EXAM BY PATHOLOGIST: ICD-10-PCS | Mod: 26,,, | Performed by: STUDENT IN AN ORGANIZED HEALTH CARE EDUCATION/TRAINING PROGRAM

## 2022-05-24 PROCEDURE — 25000003 PHARM REV CODE 250: Performed by: STUDENT IN AN ORGANIZED HEALTH CARE EDUCATION/TRAINING PROGRAM

## 2022-05-24 PROCEDURE — 88342 IMHCHEM/IMCYTCHM 1ST ANTB: CPT | Performed by: STUDENT IN AN ORGANIZED HEALTH CARE EDUCATION/TRAINING PROGRAM

## 2022-05-24 PROCEDURE — 71000016 HC POSTOP RECOV ADDL HR: Performed by: NEUROLOGICAL SURGERY

## 2022-05-24 PROCEDURE — 88341 IMHCHEM/IMCYTCHM EA ADD ANTB: CPT | Mod: 59 | Performed by: STUDENT IN AN ORGANIZED HEALTH CARE EDUCATION/TRAINING PROGRAM

## 2022-05-24 PROCEDURE — D9220A PRA ANESTHESIA: ICD-10-PCS | Mod: CRNA,,, | Performed by: STUDENT IN AN ORGANIZED HEALTH CARE EDUCATION/TRAINING PROGRAM

## 2022-05-24 PROCEDURE — 94761 N-INVAS EAR/PLS OXIMETRY MLT: CPT

## 2022-05-24 PROCEDURE — 36415 COLL VENOUS BLD VENIPUNCTURE: CPT | Performed by: STUDENT IN AN ORGANIZED HEALTH CARE EDUCATION/TRAINING PROGRAM

## 2022-05-24 PROCEDURE — 88341 PR IHC OR ICC EACH ADD'L SINGLE ANTIBODY  STAINPR: ICD-10-PCS | Mod: 26,,, | Performed by: STUDENT IN AN ORGANIZED HEALTH CARE EDUCATION/TRAINING PROGRAM

## 2022-05-24 PROCEDURE — U0005 INFEC AGEN DETEC AMPLI PROBE: HCPCS | Performed by: STUDENT IN AN ORGANIZED HEALTH CARE EDUCATION/TRAINING PROGRAM

## 2022-05-24 PROCEDURE — 87176 TISSUE HOMOGENIZATION CULTR: CPT | Performed by: NEUROLOGICAL SURGERY

## 2022-05-24 PROCEDURE — 88305 TISSUE EXAM BY PATHOLOGIST: CPT | Mod: 26,,, | Performed by: STUDENT IN AN ORGANIZED HEALTH CARE EDUCATION/TRAINING PROGRAM

## 2022-05-24 PROCEDURE — 87075 CULTR BACTERIA EXCEPT BLOOD: CPT | Performed by: NEUROLOGICAL SURGERY

## 2022-05-24 PROCEDURE — 63300 REMOVE VERT XDRL BODY CRVCL: CPT | Mod: ,,, | Performed by: NEUROLOGICAL SURGERY

## 2022-05-24 PROCEDURE — C1713 ANCHOR/SCREW BN/BN,TIS/BN: HCPCS | Performed by: NEUROLOGICAL SURGERY

## 2022-05-24 PROCEDURE — 88331 PR  PATH CONSULT IN SURG,W FRZ SEC: ICD-10-PCS | Mod: 26,,, | Performed by: STUDENT IN AN ORGANIZED HEALTH CARE EDUCATION/TRAINING PROGRAM

## 2022-05-24 PROCEDURE — 88311 DECALCIFY TISSUE: CPT | Mod: 26,,, | Performed by: STUDENT IN AN ORGANIZED HEALTH CARE EDUCATION/TRAINING PROGRAM

## 2022-05-24 PROCEDURE — 85025 COMPLETE CBC W/AUTO DIFF WBC: CPT | Performed by: STUDENT IN AN ORGANIZED HEALTH CARE EDUCATION/TRAINING PROGRAM

## 2022-05-24 PROCEDURE — 22854 INSJ BIOMECHANICAL DEVICE: CPT | Mod: ,,, | Performed by: NEUROLOGICAL SURGERY

## 2022-05-24 PROCEDURE — U0003 INFECTIOUS AGENT DETECTION BY NUCLEIC ACID (DNA OR RNA); SEVERE ACUTE RESPIRATORY SYNDROME CORONAVIRUS 2 (SARS-COV-2) (CORONAVIRUS DISEASE [COVID-19]), AMPLIFIED PROBE TECHNIQUE, MAKING USE OF HIGH THROUGHPUT TECHNOLOGIES AS DESCRIBED BY CMS-2020-01-R: HCPCS | Performed by: STUDENT IN AN ORGANIZED HEALTH CARE EDUCATION/TRAINING PROGRAM

## 2022-05-24 PROCEDURE — 87205 SMEAR GRAM STAIN: CPT | Performed by: NEUROLOGICAL SURGERY

## 2022-05-24 PROCEDURE — 87206 SMEAR FLUORESCENT/ACID STAI: CPT | Performed by: NEUROLOGICAL SURGERY

## 2022-05-24 PROCEDURE — 36000711: Performed by: NEUROLOGICAL SURGERY

## 2022-05-24 PROCEDURE — 36000710: Performed by: NEUROLOGICAL SURGERY

## 2022-05-24 PROCEDURE — 22845 PR ANTERIOR INSTRUMENTATION 2-3 VERTEBRAL SEGMENTS: ICD-10-PCS | Mod: 59,,, | Performed by: NEUROLOGICAL SURGERY

## 2022-05-24 PROCEDURE — A9585 GADOBUTROL INJECTION: HCPCS | Performed by: STUDENT IN AN ORGANIZED HEALTH CARE EDUCATION/TRAINING PROGRAM

## 2022-05-24 PROCEDURE — 80048 BASIC METABOLIC PNL TOTAL CA: CPT | Performed by: STUDENT IN AN ORGANIZED HEALTH CARE EDUCATION/TRAINING PROGRAM

## 2022-05-24 PROCEDURE — 63600175 PHARM REV CODE 636 W HCPCS: Performed by: STUDENT IN AN ORGANIZED HEALTH CARE EDUCATION/TRAINING PROGRAM

## 2022-05-24 PROCEDURE — 22585 PR ARTHRODESIS ANT INTERBODY MIN DISCECTOMY,EA ADDL: ICD-10-PCS | Mod: ,,, | Performed by: NEUROLOGICAL SURGERY

## 2022-05-24 PROCEDURE — 88311 DECALCIFY TISSUE: CPT | Performed by: STUDENT IN AN ORGANIZED HEALTH CARE EDUCATION/TRAINING PROGRAM

## 2022-05-24 PROCEDURE — 88342 CHG IMMUNOCYTOCHEMISTRY: ICD-10-PCS | Mod: 26,,, | Performed by: STUDENT IN AN ORGANIZED HEALTH CARE EDUCATION/TRAINING PROGRAM

## 2022-05-24 PROCEDURE — 22845 INSERT SPINE FIXATION DEVICE: CPT | Mod: 59,,, | Performed by: NEUROLOGICAL SURGERY

## 2022-05-24 PROCEDURE — D9220A PRA ANESTHESIA: ICD-10-PCS | Mod: ANES,,, | Performed by: ANESTHESIOLOGY

## 2022-05-24 PROCEDURE — 85730 THROMBOPLASTIN TIME PARTIAL: CPT | Performed by: STUDENT IN AN ORGANIZED HEALTH CARE EDUCATION/TRAINING PROGRAM

## 2022-05-24 PROCEDURE — 71000015 HC POSTOP RECOV 1ST HR: Performed by: NEUROLOGICAL SURGERY

## 2022-05-24 PROCEDURE — 88307 TISSUE EXAM BY PATHOLOGIST: CPT | Mod: 26,,, | Performed by: STUDENT IN AN ORGANIZED HEALTH CARE EDUCATION/TRAINING PROGRAM

## 2022-05-24 PROCEDURE — D9220A PRA ANESTHESIA: Mod: CRNA,,, | Performed by: STUDENT IN AN ORGANIZED HEALTH CARE EDUCATION/TRAINING PROGRAM

## 2022-05-24 PROCEDURE — 88341 IMHCHEM/IMCYTCHM EA ADD ANTB: CPT | Mod: 26,,, | Performed by: STUDENT IN AN ORGANIZED HEALTH CARE EDUCATION/TRAINING PROGRAM

## 2022-05-24 PROCEDURE — 63600175 PHARM REV CODE 636 W HCPCS

## 2022-05-24 PROCEDURE — 63600175 PHARM REV CODE 636 W HCPCS: Performed by: NEUROLOGICAL SURGERY

## 2022-05-24 PROCEDURE — 20930 SP BONE ALGRFT MORSEL ADD-ON: CPT | Mod: ,,, | Performed by: NEUROLOGICAL SURGERY

## 2022-05-24 PROCEDURE — 27201037 HC PRESSURE MONITORING SET UP

## 2022-05-24 PROCEDURE — 88331 PATH CONSLTJ SURG 1 BLK 1SPC: CPT | Performed by: STUDENT IN AN ORGANIZED HEALTH CARE EDUCATION/TRAINING PROGRAM

## 2022-05-24 PROCEDURE — 88307 PR  SURG PATH,LEVEL V: ICD-10-PCS | Mod: 26,,, | Performed by: STUDENT IN AN ORGANIZED HEALTH CARE EDUCATION/TRAINING PROGRAM

## 2022-05-24 PROCEDURE — 63300 PR REMV VERT,EXDUR,CERV TUMOR: ICD-10-PCS | Mod: ,,, | Performed by: NEUROLOGICAL SURGERY

## 2022-05-24 PROCEDURE — 88331 PATH CONSLTJ SURG 1 BLK 1SPC: CPT | Mod: 26,,, | Performed by: STUDENT IN AN ORGANIZED HEALTH CARE EDUCATION/TRAINING PROGRAM

## 2022-05-24 PROCEDURE — 88311 PR  DECALCIFY TISSUE: ICD-10-PCS | Mod: 26,,, | Performed by: STUDENT IN AN ORGANIZED HEALTH CARE EDUCATION/TRAINING PROGRAM

## 2022-05-24 PROCEDURE — 25000003 PHARM REV CODE 250: Performed by: NEUROLOGICAL SURGERY

## 2022-05-24 PROCEDURE — 22554 PR ARTHRODESIS ANT INTERBODY MIN DISCECTOMY, CERVICAL BELOW C2: ICD-10-PCS | Mod: 51,,, | Performed by: NEUROLOGICAL SURGERY

## 2022-05-24 PROCEDURE — D9220A PRA ANESTHESIA: Mod: ANES,,, | Performed by: ANESTHESIOLOGY

## 2022-05-24 PROCEDURE — 71000033 HC RECOVERY, INTIAL HOUR: Performed by: NEUROLOGICAL SURGERY

## 2022-05-24 PROCEDURE — 87070 CULTURE OTHR SPECIMN AEROBIC: CPT | Performed by: NEUROLOGICAL SURGERY

## 2022-05-24 PROCEDURE — 27800903 OPTIME MED/SURG SUP & DEVICES OTHER IMPLANTS: Performed by: NEUROLOGICAL SURGERY

## 2022-05-24 PROCEDURE — 27201423 OPTIME MED/SURG SUP & DEVICES STERILE SUPPLY: Performed by: NEUROLOGICAL SURGERY

## 2022-05-24 PROCEDURE — 88305 TISSUE EXAM BY PATHOLOGIST: CPT | Mod: 59 | Performed by: STUDENT IN AN ORGANIZED HEALTH CARE EDUCATION/TRAINING PROGRAM

## 2022-05-24 PROCEDURE — 20600001 HC STEP DOWN PRIVATE ROOM

## 2022-05-24 PROCEDURE — 37000009 HC ANESTHESIA EA ADD 15 MINS: Performed by: NEUROLOGICAL SURGERY

## 2022-05-24 PROCEDURE — 88342 IMHCHEM/IMCYTCHM 1ST ANTB: CPT | Mod: 26,,, | Performed by: STUDENT IN AN ORGANIZED HEALTH CARE EDUCATION/TRAINING PROGRAM

## 2022-05-24 PROCEDURE — 87116 MYCOBACTERIA CULTURE: CPT | Performed by: NEUROLOGICAL SURGERY

## 2022-05-24 PROCEDURE — 22554 ARTHRD ANT NTRBD MIN DSC CRV: CPT | Mod: 51,,, | Performed by: NEUROLOGICAL SURGERY

## 2022-05-24 PROCEDURE — 63600175 PHARM REV CODE 636 W HCPCS: Performed by: ANESTHESIOLOGY

## 2022-05-24 PROCEDURE — 22854 PR INS BIOMECH DEV, VERT CORPECTOMY W/INTRBODY ARTHRODESIS EA INTERSPC: ICD-10-PCS | Mod: ,,, | Performed by: NEUROLOGICAL SURGERY

## 2022-05-24 PROCEDURE — 25500020 PHARM REV CODE 255: Performed by: STUDENT IN AN ORGANIZED HEALTH CARE EDUCATION/TRAINING PROGRAM

## 2022-05-24 PROCEDURE — 87102 FUNGUS ISOLATION CULTURE: CPT | Performed by: NEUROLOGICAL SURGERY

## 2022-05-24 PROCEDURE — 20930 PR ALLOGRAFT FOR SPINE SURGERY ONLY MORSELIZED: ICD-10-PCS | Mod: ,,, | Performed by: NEUROLOGICAL SURGERY

## 2022-05-24 DEVICE — GRAFT ALTAPORE BIOACTIVE 5ML: Type: IMPLANTABLE DEVICE | Site: SPINE CERVICAL | Status: FUNCTIONAL

## 2022-05-24 DEVICE — IMPLANTABLE DEVICE: Type: IMPLANTABLE DEVICE | Site: SPINE CERVICAL | Status: FUNCTIONAL

## 2022-05-24 RX ORDER — ONDANSETRON 2 MG/ML
INJECTION INTRAMUSCULAR; INTRAVENOUS
Status: DISCONTINUED | OUTPATIENT
Start: 2022-05-24 | End: 2022-05-24

## 2022-05-24 RX ORDER — CEFAZOLIN SODIUM/WATER 2 G/20 ML
SYRINGE (ML) INTRAVENOUS
Status: DISCONTINUED | OUTPATIENT
Start: 2022-05-24 | End: 2022-05-24

## 2022-05-24 RX ORDER — DEXAMETHASONE SODIUM PHOSPHATE 4 MG/ML
INJECTION, SOLUTION INTRA-ARTICULAR; INTRALESIONAL; INTRAMUSCULAR; INTRAVENOUS; SOFT TISSUE
Status: DISCONTINUED | OUTPATIENT
Start: 2022-05-24 | End: 2022-05-24

## 2022-05-24 RX ORDER — PHENYLEPHRINE HYDROCHLORIDE 10 MG/ML
INJECTION INTRAVENOUS
Status: DISCONTINUED | OUTPATIENT
Start: 2022-05-24 | End: 2022-05-24

## 2022-05-24 RX ORDER — MAG HYDROX/ALUMINUM HYD/SIMETH 200-200-20
30 SUSPENSION, ORAL (FINAL DOSE FORM) ORAL EVERY 4 HOURS PRN
Status: DISCONTINUED | OUTPATIENT
Start: 2022-05-24 | End: 2022-05-27 | Stop reason: HOSPADM

## 2022-05-24 RX ORDER — HYDROCODONE BITARTRATE AND ACETAMINOPHEN 500; 5 MG/1; MG/1
TABLET ORAL
Status: DISCONTINUED | OUTPATIENT
Start: 2022-05-24 | End: 2022-05-26

## 2022-05-24 RX ORDER — DEXMEDETOMIDINE HYDROCHLORIDE 100 UG/ML
INJECTION, SOLUTION INTRAVENOUS
Status: DISCONTINUED | OUTPATIENT
Start: 2022-05-24 | End: 2022-05-24

## 2022-05-24 RX ORDER — HYDROMORPHONE HYDROCHLORIDE 1 MG/ML
0.2 INJECTION, SOLUTION INTRAMUSCULAR; INTRAVENOUS; SUBCUTANEOUS EVERY 5 MIN PRN
Status: DISCONTINUED | OUTPATIENT
Start: 2022-05-24 | End: 2022-05-24 | Stop reason: HOSPADM

## 2022-05-24 RX ORDER — FENTANYL CITRATE 50 UG/ML
INJECTION, SOLUTION INTRAMUSCULAR; INTRAVENOUS
Status: DISCONTINUED | OUTPATIENT
Start: 2022-05-24 | End: 2022-05-24

## 2022-05-24 RX ORDER — CEFAZOLIN SODIUM/WATER 2 G/20 ML
2 SYRINGE (ML) INTRAVENOUS
Status: CANCELLED | OUTPATIENT
Start: 2022-05-24

## 2022-05-24 RX ORDER — KETAMINE HCL IN 0.9 % NACL 50 MG/5 ML
SYRINGE (ML) INTRAVENOUS
Status: DISCONTINUED | OUTPATIENT
Start: 2022-05-24 | End: 2022-05-24

## 2022-05-24 RX ORDER — PROCHLORPERAZINE EDISYLATE 5 MG/ML
5 INJECTION INTRAMUSCULAR; INTRAVENOUS EVERY 6 HOURS PRN
Status: DISCONTINUED | OUTPATIENT
Start: 2022-05-24 | End: 2022-05-27 | Stop reason: HOSPADM

## 2022-05-24 RX ORDER — SODIUM CHLORIDE 9 MG/ML
INJECTION, SOLUTION INTRAVENOUS CONTINUOUS
Status: DISCONTINUED | OUTPATIENT
Start: 2022-05-24 | End: 2022-05-25

## 2022-05-24 RX ORDER — PROPOFOL 10 MG/ML
VIAL (ML) INTRAVENOUS
Status: DISCONTINUED | OUTPATIENT
Start: 2022-05-24 | End: 2022-05-24

## 2022-05-24 RX ORDER — HYDROMORPHONE HYDROCHLORIDE 2 MG/ML
INJECTION, SOLUTION INTRAMUSCULAR; INTRAVENOUS; SUBCUTANEOUS
Status: DISCONTINUED | OUTPATIENT
Start: 2022-05-24 | End: 2022-05-24

## 2022-05-24 RX ORDER — MORPHINE SULFATE 2 MG/ML
2 INJECTION, SOLUTION INTRAMUSCULAR; INTRAVENOUS EVERY 4 HOURS PRN
Status: DISCONTINUED | OUTPATIENT
Start: 2022-05-24 | End: 2022-05-27 | Stop reason: HOSPADM

## 2022-05-24 RX ORDER — BISACODYL 10 MG
10 SUPPOSITORY, RECTAL RECTAL DAILY
Status: DISCONTINUED | OUTPATIENT
Start: 2022-05-24 | End: 2022-05-27 | Stop reason: HOSPADM

## 2022-05-24 RX ORDER — ACETAMINOPHEN 10 MG/ML
INJECTION, SOLUTION INTRAVENOUS
Status: DISCONTINUED | OUTPATIENT
Start: 2022-05-24 | End: 2022-05-24

## 2022-05-24 RX ORDER — LIDOCAINE HCL/PF 100 MG/5ML
SYRINGE (ML) INTRAVENOUS
Status: DISCONTINUED | OUTPATIENT
Start: 2022-05-24 | End: 2022-05-24

## 2022-05-24 RX ORDER — MUPIROCIN 20 MG/G
OINTMENT TOPICAL
Status: CANCELLED | OUTPATIENT
Start: 2022-05-24

## 2022-05-24 RX ORDER — LORAZEPAM 2 MG/ML
0.25 INJECTION INTRAMUSCULAR ONCE AS NEEDED
Status: DISCONTINUED | OUTPATIENT
Start: 2022-05-24 | End: 2022-05-24 | Stop reason: HOSPADM

## 2022-05-24 RX ORDER — SUCCINYLCHOLINE CHLORIDE 20 MG/ML
INJECTION INTRAMUSCULAR; INTRAVENOUS
Status: DISCONTINUED | OUTPATIENT
Start: 2022-05-24 | End: 2022-05-24

## 2022-05-24 RX ORDER — LABETALOL HYDROCHLORIDE 5 MG/ML
INJECTION, SOLUTION INTRAVENOUS
Status: DISCONTINUED | OUTPATIENT
Start: 2022-05-24 | End: 2022-05-24

## 2022-05-24 RX ORDER — ONDANSETRON 8 MG/1
8 TABLET, ORALLY DISINTEGRATING ORAL EVERY 6 HOURS PRN
Status: DISCONTINUED | OUTPATIENT
Start: 2022-05-24 | End: 2022-05-27 | Stop reason: HOSPADM

## 2022-05-24 RX ORDER — NICARDIPINE HYDROCHLORIDE 2.5 MG/ML
INJECTION INTRAVENOUS
Status: DISCONTINUED | OUTPATIENT
Start: 2022-05-24 | End: 2022-05-24

## 2022-05-24 RX ORDER — MUPIROCIN 20 MG/G
1 OINTMENT TOPICAL 2 TIMES DAILY
Status: CANCELLED | OUTPATIENT
Start: 2022-05-24 | End: 2022-05-25

## 2022-05-24 RX ORDER — VANCOMYCIN HYDROCHLORIDE 1 G/20ML
INJECTION, POWDER, LYOPHILIZED, FOR SOLUTION INTRAVENOUS
Status: DISCONTINUED | OUTPATIENT
Start: 2022-05-24 | End: 2022-05-24 | Stop reason: HOSPADM

## 2022-05-24 RX ORDER — ROCURONIUM BROMIDE 10 MG/ML
INJECTION, SOLUTION INTRAVENOUS
Status: DISCONTINUED | OUTPATIENT
Start: 2022-05-24 | End: 2022-05-24

## 2022-05-24 RX ORDER — MUPIROCIN 20 MG/G
OINTMENT TOPICAL 2 TIMES DAILY
Status: DISCONTINUED | OUTPATIENT
Start: 2022-05-24 | End: 2022-05-27 | Stop reason: HOSPADM

## 2022-05-24 RX ORDER — LIDOCAINE HYDROCHLORIDE AND EPINEPHRINE 10; 10 MG/ML; UG/ML
INJECTION, SOLUTION INFILTRATION; PERINEURAL
Status: DISCONTINUED | OUTPATIENT
Start: 2022-05-24 | End: 2022-05-24 | Stop reason: HOSPADM

## 2022-05-24 RX ORDER — SODIUM CHLORIDE 0.9 % (FLUSH) 0.9 %
3 SYRINGE (ML) INJECTION
Status: DISCONTINUED | OUTPATIENT
Start: 2022-05-24 | End: 2022-05-27 | Stop reason: HOSPADM

## 2022-05-24 RX ORDER — PROPOFOL 10 MG/ML
VIAL (ML) INTRAVENOUS CONTINUOUS PRN
Status: DISCONTINUED | OUTPATIENT
Start: 2022-05-24 | End: 2022-05-24

## 2022-05-24 RX ORDER — HYDRALAZINE HYDROCHLORIDE 20 MG/ML
10 INJECTION INTRAMUSCULAR; INTRAVENOUS EVERY 6 HOURS PRN
Status: DISCONTINUED | OUTPATIENT
Start: 2022-05-24 | End: 2022-05-27 | Stop reason: HOSPADM

## 2022-05-24 RX ORDER — OXYCODONE AND ACETAMINOPHEN 10; 325 MG/1; MG/1
1 TABLET ORAL EVERY 4 HOURS PRN
Status: DISCONTINUED | OUTPATIENT
Start: 2022-05-24 | End: 2022-05-27 | Stop reason: HOSPADM

## 2022-05-24 RX ORDER — SODIUM CHLORIDE 9 MG/ML
INJECTION, SOLUTION INTRAVENOUS CONTINUOUS PRN
Status: DISCONTINUED | OUTPATIENT
Start: 2022-05-24 | End: 2022-05-24

## 2022-05-24 RX ORDER — CEFAZOLIN SODIUM/D5W 2 G/100 ML
2 PLASTIC BAG, INJECTION (ML) INTRAVENOUS
Status: DISCONTINUED | OUTPATIENT
Start: 2022-05-24 | End: 2022-05-25

## 2022-05-24 RX ORDER — ACETAMINOPHEN 325 MG/1
650 TABLET ORAL EVERY 4 HOURS PRN
Status: DISCONTINUED | OUTPATIENT
Start: 2022-05-24 | End: 2022-05-27 | Stop reason: HOSPADM

## 2022-05-24 RX ORDER — AMOXICILLIN 250 MG
2 CAPSULE ORAL NIGHTLY PRN
Status: DISCONTINUED | OUTPATIENT
Start: 2022-05-24 | End: 2022-05-27 | Stop reason: HOSPADM

## 2022-05-24 RX ORDER — METHYLPREDNISOLONE ACETATE 40 MG/ML
INJECTION, SUSPENSION INTRA-ARTICULAR; INTRALESIONAL; INTRAMUSCULAR; SOFT TISSUE
Status: DISCONTINUED | OUTPATIENT
Start: 2022-05-24 | End: 2022-05-24 | Stop reason: HOSPADM

## 2022-05-24 RX ORDER — HYDROMORPHONE HYDROCHLORIDE 1 MG/ML
INJECTION, SOLUTION INTRAMUSCULAR; INTRAVENOUS; SUBCUTANEOUS
Status: COMPLETED
Start: 2022-05-24 | End: 2022-05-24

## 2022-05-24 RX ADMIN — DOCUSATE SODIUM 100 MG: 100 CAPSULE ORAL at 08:05

## 2022-05-24 RX ADMIN — PHENYLEPHRINE HYDROCHLORIDE 50 MCG: 10 INJECTION INTRAVENOUS at 12:05

## 2022-05-24 RX ADMIN — HYDROMORPHONE HYDROCHLORIDE 0.2 MG: 1 INJECTION, SOLUTION INTRAMUSCULAR; INTRAVENOUS; SUBCUTANEOUS at 04:05

## 2022-05-24 RX ADMIN — LABETALOL HYDROCHLORIDE 10 MG: 5 INJECTION, SOLUTION INTRAVENOUS at 03:05

## 2022-05-24 RX ADMIN — Medication 10 MG: at 01:05

## 2022-05-24 RX ADMIN — NICARDIPINE HYDROCHLORIDE 0.2 MG: 2.5 INJECTION INTRAVENOUS at 03:05

## 2022-05-24 RX ADMIN — SUCCINYLCHOLINE CHLORIDE 140 MG: 20 INJECTION, SOLUTION INTRAMUSCULAR; INTRAVENOUS at 11:05

## 2022-05-24 RX ADMIN — PROPOFOL 150 MCG/KG/MIN: 10 INJECTION, EMULSION INTRAVENOUS at 11:05

## 2022-05-24 RX ADMIN — PHENYLEPHRINE HYDROCHLORIDE 100 MCG: 10 INJECTION INTRAVENOUS at 11:05

## 2022-05-24 RX ADMIN — Medication 25 MG: at 12:05

## 2022-05-24 RX ADMIN — SODIUM CHLORIDE: 9 INJECTION, SOLUTION INTRAVENOUS at 10:05

## 2022-05-24 RX ADMIN — SODIUM CHLORIDE: 0.9 INJECTION, SOLUTION INTRAVENOUS at 08:05

## 2022-05-24 RX ADMIN — DEXMEDETOMIDINE HYDROCHLORIDE 4 MCG: 100 INJECTION, SOLUTION, CONCENTRATE INTRAVENOUS at 02:05

## 2022-05-24 RX ADMIN — LABETALOL HYDROCHLORIDE 5 MG: 5 INJECTION, SOLUTION INTRAVENOUS at 02:05

## 2022-05-24 RX ADMIN — ATORVASTATIN CALCIUM 80 MG: 20 TABLET, FILM COATED ORAL at 08:05

## 2022-05-24 RX ADMIN — DEXAMETHASONE SODIUM PHOSPHATE 10 MG: 4 INJECTION, SOLUTION INTRAMUSCULAR; INTRAVENOUS at 11:05

## 2022-05-24 RX ADMIN — GLYCOPYRROLATE 0.2 MG: 0.2 INJECTION, SOLUTION INTRAMUSCULAR; INTRAVITREAL at 11:05

## 2022-05-24 RX ADMIN — Medication 15 MG: at 02:05

## 2022-05-24 RX ADMIN — ACETAMINOPHEN 1000 MG: 10 INJECTION INTRAVENOUS at 01:05

## 2022-05-24 RX ADMIN — TRAZODONE HYDROCHLORIDE 50 MG: 50 TABLET ORAL at 09:05

## 2022-05-24 RX ADMIN — PROPOFOL 170 MG: 10 INJECTION, EMULSION INTRAVENOUS at 11:05

## 2022-05-24 RX ADMIN — HYDROMORPHONE HYDROCHLORIDE 0.4 MG: 2 INJECTION INTRAMUSCULAR; INTRAVENOUS; SUBCUTANEOUS at 03:05

## 2022-05-24 RX ADMIN — FENTANYL CITRATE 100 MCG: 50 INJECTION, SOLUTION INTRAMUSCULAR; INTRAVENOUS at 11:05

## 2022-05-24 RX ADMIN — OXYCODONE HYDROCHLORIDE AND ACETAMINOPHEN 1 TABLET: 10; 325 TABLET ORAL at 04:05

## 2022-05-24 RX ADMIN — ESMOLOL HYDROCHLORIDE 10 MG: 10 INJECTION INTRAVENOUS at 03:05

## 2022-05-24 RX ADMIN — LIDOCAINE HYDROCHLORIDE 80 MG: 20 INJECTION, SOLUTION INTRAVENOUS at 11:05

## 2022-05-24 RX ADMIN — DONEPEZIL HYDROCHLORIDE 10 MG: 5 TABLET ORAL at 08:05

## 2022-05-24 RX ADMIN — Medication 2 G: at 12:05

## 2022-05-24 RX ADMIN — ROCURONIUM BROMIDE 10 MG: 10 INJECTION, SOLUTION INTRAVENOUS at 11:05

## 2022-05-24 RX ADMIN — PHENYLEPHRINE HYDROCHLORIDE 150 MCG: 10 INJECTION INTRAVENOUS at 11:05

## 2022-05-24 RX ADMIN — ONDANSETRON HYDROCHLORIDE 4 MG: 2 INJECTION INTRAMUSCULAR; INTRAVENOUS at 03:05

## 2022-05-24 RX ADMIN — REMIFENTANIL HYDROCHLORIDE 0.2 MCG/KG/MIN: 1 INJECTION, POWDER, LYOPHILIZED, FOR SOLUTION INTRAVENOUS at 11:05

## 2022-05-24 RX ADMIN — SODIUM CHLORIDE 0.3 MCG/KG/MIN: 9 INJECTION, SOLUTION INTRAVENOUS at 11:05

## 2022-05-24 RX ADMIN — SODIUM CHLORIDE, SODIUM GLUCONATE, SODIUM ACETATE, POTASSIUM CHLORIDE, MAGNESIUM CHLORIDE, SODIUM PHOSPHATE, DIBASIC, AND POTASSIUM PHOSPHATE: .53; .5; .37; .037; .03; .012; .00082 INJECTION, SOLUTION INTRAVENOUS at 11:05

## 2022-05-24 RX ADMIN — MUPIROCIN: 20 OINTMENT TOPICAL at 09:05

## 2022-05-24 RX ADMIN — Medication 2 G: at 09:05

## 2022-05-24 RX ADMIN — GABAPENTIN 200 MG: 100 CAPSULE ORAL at 09:05

## 2022-05-24 RX ADMIN — AMLODIPINE BESYLATE 10 MG: 10 TABLET ORAL at 08:05

## 2022-05-24 RX ADMIN — SODIUM CHLORIDE: 9 INJECTION, SOLUTION INTRAVENOUS at 11:05

## 2022-05-24 RX ADMIN — DOCUSATE SODIUM 100 MG: 100 CAPSULE ORAL at 09:05

## 2022-05-24 NOTE — BRIEF OP NOTE
[facility] - Telemetry Stepdown  Brief Operative Note    SUMMARY     Surgery Date: 5/24/2022     Surgeon(s) and Role:     * Sameera Medina MD - Primary     * Sean Varghese MD - Resident - Assisting     * Edgar Diaz MD - Resident - Assisting        Pre-op Diagnosis:  Cervical spinal mass [G95.89]    Post-op Diagnosis:  Post-Op Diagnosis Codes:     * Cervical spinal mass [G95.89]    Procedure(s) (LRB):  CORPECTOMY, SPINE, CERVICAL -C5 corpectomy with C4-6 fusion (N/A)    Anesthesia: General    Estimated Blood Loss: 50 mL    Estimated Blood Loss has been documented.         Specimens:   Specimen (24h ago, onward)             Start     Ordered    05/24/22 1420  Specimen to Pathology, Surgery Neurosurgery  Once        Comments: Pre-op Diagnosis: Cervical spinal mass [G95.89]Procedure(s):CORPECTOMY, SPINE, CERVICAL -C5 corpectomy with C4-6 fusion Number of specimens: 5Name of specimens:1. Spinal lesion - frozen2. Spinal tumor #2 - frozen3. Spinal lesion - permanent 4. Spinal tumor #2 - permanent 5. Spinal tumor #3 - permanent     References:    Click here for ordering Quick Tip   Question Answer Comment   Procedure Type: Neurosurgery    Which provider would you like to cc? SAMEERA MEDINA    Release to patient Immediate        05/24/22 1421                [id number]

## 2022-05-24 NOTE — TRANSFER OF CARE
"Anesthesia Transfer of Care Note    Patient: Jose Davis    Procedure(s) Performed: Procedure(s) (LRB):  CORPECTOMY, SPINE, CERVICAL -C5 corpectomy with C4-6 fusion (N/A)    Patient location: PACU    Anesthesia Type: general    Transport from OR: Transported from OR on 6-10 L/min O2 by face mask with adequate spontaneous ventilation    Post pain: adequate analgesia    Post assessment: no apparent anesthetic complications and tolerated procedure well    Post vital signs: stable    Level of consciousness: awake and alert    Nausea/Vomiting: no nausea/vomiting    Complications: none    Transfer of care protocol was followed      Last vitals:   Visit Vitals  /85   Pulse 88   Temp 37.5 °C (99.5 °F) (Temporal)   Resp 16   Ht 5' 4.02" (1.626 m)   Wt 56.2 kg (123 lb 15.9 oz)   SpO2 100%   BMI 21.27 kg/m²     "

## 2022-05-24 NOTE — NURSING
2350- Off unit to MRI via wheelchair.   2430- Back on unit and back to bed. No complaints voiced or distress noted.

## 2022-05-24 NOTE — INTERVAL H&P NOTE
The patient has been examined and the H&P has been reviewed:    I concur with the findings and no changes have occurred since H&P was written.    Surgery risks, benefits and alternative options discussed and understood by patient/family.    Plan for OR tomorrow for planned procedure. Admit to NSGY floor overnight, NPO midnight. FU preop labs. Continue home meds(ordered)      There are no hospital problems to display for this patient.

## 2022-05-24 NOTE — NURSING TRANSFER
Nursing Transfer Note      5/24/2022     Reason patient is being transferred: Post Op    Transfer To: [id number]--report given to Melissa Woodall    Transfer via bed    Transfer with : neck brace    Transported by : Patient transport    Medicines sent: Cefazolin IV     Any special needs or follow-up needed:     Chart send with patient: Yes    Notified: daughter--spoken to over the phone    Patient reassessed at: 5-24-22

## 2022-05-24 NOTE — PLAN OF CARE
Patient is currently off of the floor, unable to complete the discharge planning assessment. Will re-attempt at a later time.    Tracy Sheriff RN  Ext [phone]

## 2022-05-24 NOTE — ANESTHESIA PREPROCEDURE EVALUATION
[facility]  Anesthesia Pre-Operative Evaluation         Patient Name: Jose Davis  YOB: 1945  MRN: [id number]    SUBJECTIVE:     Pre-operative evaluation for Procedure(s) (LRB):  CORPECTOMY, SPINE, CERVICAL -C5 corpectomy with C4-6 fusion (N/A)     05/23/2022    Jose Davis is a 76 y.o. male w/ a significant PMHx of HTN, Lupus, and Hep C found to have C5 mass on MRI Cervical spine that extends into the articular pillar on the right.    He presented to [facility] 3 weeks ago with complaint of dizziness that started upon waking. During the workup for this dizziness, CT cervical spine was obtained which revealed a C5 expansile vertebral body mass was found with extension into the pedicle and the facet on the right. MRI cervical spine was obtained W WO contrast which confirmed enhancing mass lesion and he was transferred to [place] for NSGY evaluation. The work-up was completed with MRI brain as well as CT c/a/p which were both negative. CTA was negative for right vertebral involvement. No emergent intervention was indicated and he was discharged with close outpatient follow up for evaluation here at [facility] due to the complexity of the case.    Patient now presents for the above procedure(s).      LDA: None documented.       Prev airway: ; Method of Intubation: Direct laryngoscopy; Inserted by: Other (SRNA); Airway Device: Endotracheal Tube; Mask Ventilation: Easy; Intubated: Postinduction; Blade: Roselyn #3; Airway Device Size: 7.5; Style: Cuffed; Cuff Inflation: Minimal occlusive pressure; Inflation Amount: 4; Placement Verified By: Auscultation, Capnometry, ETT Condensation; Grade: Grade III; Complicating Factors: Anterior larynx; Intubation Findings: Positive EtCO2, Bilateral breath sounds, Atraumatic/Condition of teeth unchanged;  Depth of Insertion: 21; Securment: Lips; Complications: None; Breath Sounds: Equal Bilateral; Insertion Attempts:  1;    Drips:   [START ON 5/24/2022] sodium chloride 0.9%         Patient Active Problem List   Diagnosis    Encounter for preventive health examination    Immunization due    HTN (hypertension), benign    Shoulder pain    Abnormal partial thromboplastin time (PTT)    Lupus anticoagulant disorder    Osteoarthritis of shoulder    Hepatitis C    Erectile dysfunction    Unilateral inguinal hernia with obstruction and without gangrene       Review of patient's allergies indicates:  No Known Allergies    Current Inpatient Medications:   [START ON 5/24/2022] amLODIPine  10 mg Oral Daily    [START ON 5/24/2022] atorvastatin  80 mg Oral Daily    docusate sodium  100 mg Oral BID    [START ON 5/24/2022] donepeziL  10 mg Oral Daily    gabapentin  200 mg Oral QHS    polyethylene glycol  17 g Oral Daily    traZODone  50 mg Oral QHS       No current facility-administered medications on file prior to encounter.     Current Outpatient Medications on File Prior to Encounter   Medication Sig Dispense Refill    donepeziL (ARICEPT) 10 MG tablet Take 10 mg by mouth once daily.      gabapentin (NEURONTIN) 100 MG capsule Take 200 mg by mouth every evening.      traZODone (DESYREL) 50 MG tablet Take 50 mg by mouth every evening.      amLODIPine (NORVASC) 10 MG tablet Take 10 mg by mouth.      aspirin (ECOTRIN) 81 MG EC tablet Take 81 mg by mouth once daily.      docusate sodium (COLACE) 100 MG capsule Take 1 capsule (100 mg total) by mouth 2 (two) times daily. Use while taking oxycodone (Patient not taking: No sig reported) 30 capsule 0    hydrocortisone 2.5 % cream APPLY TO AFFECTED AREA TWICE A DAY (Patient not taking: Reported on 5/18/2022) 28.35 g 3    hydrocortisone-pramoxine (ANALPRAM-HC) 2.5-1 % Crea INSERT BY RECTUM 3 TIMES A DAY AS NEEDED HEMORRHOIDS      ibuprofen/diphenhydramine cit (ADVIL PM ORAL) Take by mouth.      rosuvastatin (CRESTOR) 20 MG tablet Take 20 mg by mouth once daily.         Past  Surgical History:   Procedure Laterality Date    cyst excision from neck      cystoscope      HERNIA REPAIR      nephrectomy  2006    left for a tumor?    PROSTATE SURGERY      ROTATOR CUFF REPAIR      right    TONSILLECTOMY         Social History     Socioeconomic History    Marital status:    Tobacco Use    Smoking status: Never Smoker    Smokeless tobacco: Never Used   Substance and Sexual Activity    Alcohol use: No     Alcohol/week: 0.0 standard drinks    Drug use: No       OBJECTIVE:     Vital Signs Range (Last 24H):         Significant Labs:  Lab Results   Component Value Date    WBC 6.85 01/21/2019    HGB 13.7 (L) 01/21/2019    HCT 40.7 01/21/2019     01/21/2019    CHOL 234 (H) 04/27/2022    TRIG 121 04/27/2022    HDL 60 04/27/2022    ALT 52 (H) 12/17/2013    AST 43 (H) 12/17/2013     (L) 01/29/2019    K 3.9 01/29/2019     01/29/2019    CREATININE 1.1 01/29/2019    BUN 12 01/29/2019    CO2 25 01/29/2019    TSH 1.195 11/07/2012    PSA 3.2 09/19/2013    INR 1.1 12/07/2012    HGBA1C 6.1 11/07/2012       Diagnostic Studies: No relevant studies.    EKG:   Results for orders placed or performed during the hospital encounter of 01/21/19   EKG 12-lead    Collection Time: 01/21/19  9:23 AM    Narrative    Test Reason : Z01.818,  Vent. Rate : 069 BPM     Atrial Rate : 069 BPM     P-R Int : 158 ms          QRS Dur : 098 ms      QT Int : 376 ms       P-R-T Axes : 058 -07 046 degrees     QTc Int : 402 ms    Normal sinus rhythm with sinus arrhythmia  Normal ECG  When compared with ECG of 05-FEB-2014 11:16,  No significant change was found  Confirmed by Umberto Rodriguez MD ([id number]) on 1/21/2019 8:45:00 PM    Referred By:             Confirmed By:Umberto Rodriguez MD       2D ECHO:  TTE:  No results found for this or any previous visit.    ALFONSO:  No results found for this or any previous visit.    ASSESSMENT/PLAN:       Pre-op Assessment    I have reviewed the Patient Summary Reports.     I  have reviewed the Nursing Notes. I have reviewed the NPO Status.   I have reviewed the Medications.     Review of Systems  Anesthesia Hx:  No problems with previous Anesthesia  History of prior surgery of interest to airway management or planning: Denies Family Hx of Anesthesia complications.   Denies Personal Hx of Anesthesia complications.   Social:  Non-Smoker    Hematology/Oncology:     Oncology Normal   Hematology Comments: Lupus   EENT/Dental:EENT/Dental Normal   Cardiovascular:   Hypertension Good exercise tolerance   Pulmonary:  Pulmonary Normal    Renal/:  Renal/ Normal     Hepatic/GI:   Liver Disease, Hepatitis, C    Musculoskeletal:   Arthritis     Neurological:  Neurology Normal C5 mass    Endocrine:  Endocrine Normal    Dermatological:  Skin Normal    Psych:  Psychiatric Normal           Physical Exam  General: Well nourished, Cooperative, Alert and Oriented    Airway:  Mallampati: II   Mouth Opening: Normal  TM Distance: Normal  Tongue: Normal    Dental:  Intact        Anesthesia Plan  Type of Anesthesia, risks & benefits discussed:    Anesthesia Type: Gen ETT  Intra-op Monitoring Plan: Standard ASA Monitors and Art Line  Post Op Pain Control Plan: multimodal analgesia  Induction:  IV  Airway Plan: Direct and Video, Post-Induction  Informed Consent: Informed consent signed with the Patient and all parties understand the risks and agree with anesthesia plan.  All questions answered.   ASA Score: 3  Day of Surgery Review of History & Physical: H&P Update referred to the surgeon/provider.    Ready For Surgery From Anesthesia Perspective.     .

## 2022-05-24 NOTE — ANESTHESIA PROCEDURE NOTES
Intubation    Date/Time: 5/24/2022 11:05 AM  Performed by: Nicol Gonzales CRNA  Authorized by: Dex Rodriguez MD     Intubation:     Induction:  Intravenous    Intubated:  Postinduction    Mask Ventilation:  Easy mask    Attempts:  1    Attempted By:  CRNA    Method of Intubation:  Video laryngoscopy    Blade:  [place] 3    Laryngeal View Grade: Grade I - full view of cords      Difficult Airway Encountered?: No      Complications:  None    Airway Device:  Oral endotracheal tube    Airway Device Size:  7.5    Style/Cuff Inflation:  Cuffed (inflated to minimal occlusive pressure)    Tube secured:  21    Secured at:  The lips    Placement Verified By:  Capnometry    Complicating Factors:  None    Findings Post-Intubation:  BS equal bilateral and atraumatic/condition of teeth unchanged

## 2022-05-25 LAB
ANION GAP SERPL CALC-SCNC: 11 MMOL/L (ref 8–16)
BASOPHILS # BLD AUTO: 0.01 K/UL (ref 0–0.2)
BASOPHILS NFR BLD: 0.1 % (ref 0–1.9)
BLD PROD TYP BPU: NORMAL
BLOOD UNIT EXPIRATION DATE: NORMAL
BLOOD UNIT TYPE CODE: 5100
BLOOD UNIT TYPE: NORMAL
BUN SERPL-MCNC: 18 MG/DL (ref 8–23)
CALCIUM SERPL-MCNC: 8.8 MG/DL (ref 8.7–10.5)
CHLORIDE SERPL-SCNC: 108 MMOL/L (ref 95–110)
CO2 SERPL-SCNC: 19 MMOL/L (ref 23–29)
CODING SYSTEM: NORMAL
CREAT SERPL-MCNC: 1 MG/DL (ref 0.5–1.4)
DIFFERENTIAL METHOD: ABNORMAL
DISPENSE STATUS: NORMAL
EOSINOPHIL # BLD AUTO: 0 K/UL (ref 0–0.5)
EOSINOPHIL NFR BLD: 0 % (ref 0–8)
ERYTHROCYTE [DISTWIDTH] IN BLOOD BY AUTOMATED COUNT: 12.4 % (ref 11.5–14.5)
EST. GFR  (AFRICAN AMERICAN): >60 ML/MIN/1.73 M^2
EST. GFR  (NON AFRICAN AMERICAN): >60 ML/MIN/1.73 M^2
GLUCOSE SERPL-MCNC: 150 MG/DL (ref 70–110)
HCT VFR BLD AUTO: 36.3 % (ref 40–54)
HGB BLD-MCNC: 12.7 G/DL (ref 14–18)
IMM GRANULOCYTES # BLD AUTO: 0.04 K/UL (ref 0–0.04)
IMM GRANULOCYTES NFR BLD AUTO: 0.4 % (ref 0–0.5)
LYMPHOCYTES # BLD AUTO: 0.7 K/UL (ref 1–4.8)
LYMPHOCYTES NFR BLD: 5.9 % (ref 18–48)
MCH RBC QN AUTO: 30 PG (ref 27–31)
MCHC RBC AUTO-ENTMCNC: 35 G/DL (ref 32–36)
MCV RBC AUTO: 86 FL (ref 82–98)
MONOCYTES # BLD AUTO: 0.8 K/UL (ref 0.3–1)
MONOCYTES NFR BLD: 6.7 % (ref 4–15)
NEUTROPHILS # BLD AUTO: 9.9 K/UL (ref 1.8–7.7)
NEUTROPHILS NFR BLD: 86.9 % (ref 38–73)
NRBC BLD-RTO: 0 /100 WBC
NUM UNITS TRANS FFP: NORMAL
PLATELET # BLD AUTO: 197 K/UL (ref 150–450)
PMV BLD AUTO: 10.8 FL (ref 9.2–12.9)
POTASSIUM SERPL-SCNC: 4.1 MMOL/L (ref 3.5–5.1)
RBC # BLD AUTO: 4.24 M/UL (ref 4.6–6.2)
SODIUM SERPL-SCNC: 138 MMOL/L (ref 136–145)
WBC # BLD AUTO: 11.38 K/UL (ref 3.9–12.7)

## 2022-05-25 PROCEDURE — 97165 OT EVAL LOW COMPLEX 30 MIN: CPT

## 2022-05-25 PROCEDURE — 25000003 PHARM REV CODE 250: Performed by: STUDENT IN AN ORGANIZED HEALTH CARE EDUCATION/TRAINING PROGRAM

## 2022-05-25 PROCEDURE — 97116 GAIT TRAINING THERAPY: CPT

## 2022-05-25 PROCEDURE — 20600001 HC STEP DOWN PRIVATE ROOM

## 2022-05-25 PROCEDURE — 36415 COLL VENOUS BLD VENIPUNCTURE: CPT | Performed by: STUDENT IN AN ORGANIZED HEALTH CARE EDUCATION/TRAINING PROGRAM

## 2022-05-25 PROCEDURE — 63600175 PHARM REV CODE 636 W HCPCS: Performed by: PHYSICIAN ASSISTANT

## 2022-05-25 PROCEDURE — 97535 SELF CARE MNGMENT TRAINING: CPT

## 2022-05-25 PROCEDURE — 85025 COMPLETE CBC W/AUTO DIFF WBC: CPT | Performed by: STUDENT IN AN ORGANIZED HEALTH CARE EDUCATION/TRAINING PROGRAM

## 2022-05-25 PROCEDURE — 97161 PT EVAL LOW COMPLEX 20 MIN: CPT

## 2022-05-25 PROCEDURE — 63600175 PHARM REV CODE 636 W HCPCS: Performed by: STUDENT IN AN ORGANIZED HEALTH CARE EDUCATION/TRAINING PROGRAM

## 2022-05-25 PROCEDURE — 80048 BASIC METABOLIC PNL TOTAL CA: CPT | Performed by: STUDENT IN AN ORGANIZED HEALTH CARE EDUCATION/TRAINING PROGRAM

## 2022-05-25 PROCEDURE — 99024 PR POST-OP FOLLOW-UP VISIT: ICD-10-PCS | Mod: ,,, | Performed by: PHYSICIAN ASSISTANT

## 2022-05-25 PROCEDURE — 97530 THERAPEUTIC ACTIVITIES: CPT

## 2022-05-25 PROCEDURE — 99024 POSTOP FOLLOW-UP VISIT: CPT | Mod: ,,, | Performed by: PHYSICIAN ASSISTANT

## 2022-05-25 RX ORDER — GABAPENTIN 100 MG/1
200 CAPSULE ORAL 3 TIMES DAILY
Status: DISCONTINUED | OUTPATIENT
Start: 2022-05-26 | End: 2022-05-27 | Stop reason: HOSPADM

## 2022-05-25 RX ORDER — DEXAMETHASONE SODIUM PHOSPHATE 4 MG/ML
8 INJECTION, SOLUTION INTRA-ARTICULAR; INTRALESIONAL; INTRAMUSCULAR; INTRAVENOUS; SOFT TISSUE ONCE
Status: COMPLETED | OUTPATIENT
Start: 2022-05-25 | End: 2022-05-25

## 2022-05-25 RX ORDER — HEPARIN SODIUM 5000 [USP'U]/ML
5000 INJECTION, SOLUTION INTRAVENOUS; SUBCUTANEOUS EVERY 8 HOURS
Status: DISCONTINUED | OUTPATIENT
Start: 2022-05-25 | End: 2022-05-27 | Stop reason: HOSPADM

## 2022-05-25 RX ADMIN — GABAPENTIN 200 MG: 100 CAPSULE ORAL at 08:05

## 2022-05-25 RX ADMIN — HEPARIN SODIUM 5000 UNITS: 5000 INJECTION INTRAVENOUS; SUBCUTANEOUS at 09:05

## 2022-05-25 RX ADMIN — OXYCODONE HYDROCHLORIDE AND ACETAMINOPHEN 1 TABLET: 10; 325 TABLET ORAL at 08:05

## 2022-05-25 RX ADMIN — DEXAMETHASONE SODIUM PHOSPHATE 8 MG: 4 INJECTION INTRA-ARTICULAR; INTRALESIONAL; INTRAMUSCULAR; INTRAVENOUS; SOFT TISSUE at 09:05

## 2022-05-25 RX ADMIN — Medication 2 G: at 12:05

## 2022-05-25 RX ADMIN — TRAZODONE HYDROCHLORIDE 50 MG: 50 TABLET ORAL at 08:05

## 2022-05-25 RX ADMIN — HEPARIN SODIUM 5000 UNITS: 5000 INJECTION INTRAVENOUS; SUBCUTANEOUS at 04:05

## 2022-05-25 RX ADMIN — DOCUSATE SODIUM 100 MG: 100 CAPSULE ORAL at 08:05

## 2022-05-25 RX ADMIN — Medication 2 G: at 08:05

## 2022-05-25 RX ADMIN — MUPIROCIN: 20 OINTMENT TOPICAL at 08:05

## 2022-05-25 RX ADMIN — Medication 2 G: at 05:05

## 2022-05-25 RX ADMIN — SODIUM CHLORIDE: 0.9 INJECTION, SOLUTION INTRAVENOUS at 02:05

## 2022-05-25 RX ADMIN — POLYETHYLENE GLYCOL 3350 17 G: 17 POWDER, FOR SOLUTION ORAL at 08:05

## 2022-05-25 RX ADMIN — DONEPEZIL HYDROCHLORIDE 10 MG: 5 TABLET ORAL at 08:05

## 2022-05-25 RX ADMIN — ATORVASTATIN CALCIUM 80 MG: 20 TABLET, FILM COATED ORAL at 08:05

## 2022-05-25 RX ADMIN — BISACODYL 10 MG: 10 SUPPOSITORY RECTAL at 08:05

## 2022-05-25 RX ADMIN — AMLODIPINE BESYLATE 10 MG: 10 TABLET ORAL at 08:05

## 2022-05-25 RX ADMIN — DOCUSATE SODIUM 100 MG: 100 CAPSULE ORAL at 09:05

## 2022-05-25 NOTE — NURSING
2034- Back on unit from PACU. No report received from PACU. Patient A,A,O x 4. PERRLA. Vitals stable. Respirations even and unlabored on room air. Denies SOB. Lungs CTA. RRR. No telemetry box on patient for transport post-op. Cardinal drain to left anterior, lower neck intact. Cardinal drain wont stay compressed. MD aware per notes. Incision to left anterior neck approximated, without drainage noted. Neck brace in place and well tolerated. Patient c/o mild discomfort. Able to move all extremities. Denies numbness or tingling.  No signs of acute distress observed.   2100- Notified telemetry concerning telemetry box. Patient is placed on waiting list.   0000- Patient resting in bed. No distress noted. Denies pain at this time. Patient remains on waiting list for tele box.   0300- Patient remains on waiting list for tele box. No complaints voiced.

## 2022-05-25 NOTE — PT/OT/SLP EVAL
Occupational Therapy   Evaluation and Discharge Note    Name: Jose Davis  MRN: [id number]  Admitting Diagnosis:  Cervical spinal mass   Recent Surgery: Procedure(s) (LRB):  CORPECTOMY, SPINE, CERVICAL -C5 corpectomy with C4-6 fusion (N/A) 1 Day Post-Op    Recommendations:     Discharge Recommendations: home  Discharge Equipment Recommendations:  shower chair, grab bar  Barriers to discharge:  None    Assessment:     Jose Davis is a 76 y.o. male with a medical diagnosis of Cervical spinal mass. At this time, patient is functioning at their prior level of function and does not require further acute OT services.     Plan:     During this hospitalization, patient does not require further acute OT services.  Please re-consult if situation changes.    · Plan of Care Reviewed with: patient    Subjective     Chief Complaint: none  Patient/Family Comments/goals: I am supposed to be getting another neck brace.     Occupational Profile:  Living Environment: Patient lives with wife in a [facility] with 4 KRISTEL to enter and L HR. Patient has a tub shower combo with a shower chair and no grab bar. Patient has a walk in shower with a grab bar. Patient is able to remove the shower chair and put it in his walk in shower if needed. Patient has a regular toilet with no grab bars. Patient does have a toilet in his house with a bidet. Patient was independent with ADLs. Patient owns 2 SPCs. Patient drives only during the day time.     Pain/Comfort:  · Pain Rating 1: 0/10  · Location - Side 1: Bilateral  · Location - Orientation 1: generalized  · Location 1: neck  · Pain Addressed 1: Reposition, Distraction  · Pain Rating Post-Intervention 1: 0/10    Patients cultural, spiritual, Worship conflicts given the current situation: no    Objective:     Communicated with: GINA prior to session.  Patient found standing in room upon OT entry to room.    General Precautions: Standard, fall   Orthopedic Precautions:spinal precautions   Braces: Aspen  collar  Respiratory Status: Room air     Occupational Performance:     Functional Mobility/Transfers:  · Patient completed Sit <> Stand Transfer with independence  with  no assistive device and grab bar by toilet   · Patient completed Toilet Transfer (standing in room>toilet; toilet>bedside chair) with functional ambulation technique with modified independence with  no AD, but grab bar by toilet  · Functional Mobility: Patient ambulating in room with no AD with modified independence. Patient educated on safety concerns with being aware of items being on the floor and potential trip hazards as he wears a neck collar. Patient does ambulate slowly and takes his time.     Activities of Daily Living:  · Grooming: independence standing at sink to wash/dry hands. Patient reported he performed oral care prior to this OT evaluation this am.   · Lower Body Dressing: independence [place] and donning socks and shoes  · Toileting: modified independence per patient report prior to OT session and simulated tasks during OT evaluation/treatment    Cognitive/Visual Perceptual:  Cognitive/Psychosocial Skills:     -       Oriented to: Person, Place, Time and Situation   -       Follows Commands/attention:Follows multistep  commands  -       Communication: clear/fluent  -       Memory: No Deficits noted  -       Safety awareness/insight to disability: intact   -       Mood/Affect/Coping skills/emotional control: Appropriate to situation  Visual/Perceptual:      -reports vision deficits that limit him from driving at night      Physical Exam:  Upper Extremity Range of Motion:     -       Right Upper Extremity: WFL  -       Left Upper Extremity: WFL  Upper Extremity Strength:    -       Right Upper Extremity: WFL  -       Left Upper Extremity: WFL   Strength:    -       Right Upper Extremity: WFL  -       Left Upper Extremity: WFL  Fine Motor Coordination:    -       Intact  Gross motor coordination:   WFL    AMPA 6 Click ADL:  [facility]  Total Score: 24    Treatment & Education:  Role of OT and POC  ADL retraining  Functional mobility training  Safety  Discharge planning    Education:  Patient left standing in room (with education on safety) with call button in reach and all needs met.  Nurse notified.    GOALS:   Multidisciplinary Problems     Occupational Therapy Goals     Not on file          Multidisciplinary Problems (Resolved)        Problem: Occupational Therapy    Goal Priority Disciplines Outcome Interventions   Occupational Therapy Goal   (Resolved)     OT, PT/OT Met                    History:     Past Medical History:   Diagnosis Date    Enlarged prostate     Hypertension        Past Surgical History:   Procedure Laterality Date    cyst excision from neck      cystoscope      HERNIA REPAIR      nephrectomy  2006    left for a tumor?    PROSTATE SURGERY      ROTATOR CUFF REPAIR      right    SURGICAL REMOVAL OF VERTEBRAL BODY OF CERVICAL SPINE N/A 5/24/2022    Procedure: CORPECTOMY, SPINE, CERVICAL -C5 corpectomy with C4-6 fusion;  Surgeon: Yaya Medina MD;  Location: [facility] OR [address] [place] [address];  Service: Neurosurgery;  Laterality: N/A;  neuromonitoring, krista  headset with traction, Jones J brace, c-arm, spinewave    TONSILLECTOMY         Time Tracking:     OT Date of Treatment: 05/25/22  OT Start Time: 1053  OT Stop Time: 1110  OT Total Time (min): 17 min    Billable Minutes:Evaluation 9  Self Care/Home Management 8    5/25/2022

## 2022-05-25 NOTE — ANESTHESIA POSTPROCEDURE EVALUATION
Anesthesia Post Evaluation    Patient: Jose Davis    Procedure(s) Performed: Procedure(s) (LRB):  CORPECTOMY, SPINE, CERVICAL -C5 corpectomy with C4-6 fusion (N/A)    Final Anesthesia Type: general      Patient location during evaluation: PACU  Patient participation: Yes- Able to Participate  Level of consciousness: awake and alert  Post-procedure vital signs: reviewed and stable  Pain management: adequate  Airway patency: patent    PONV status at discharge: No PONV  Anesthetic complications: no      Cardiovascular status: blood pressure returned to baseline  Respiratory status: unassisted  Hydration status: euvolemic  Follow-up not needed.          Vitals Value Taken Time   /69 05/25/22 1511   Temp 36.5 °C (97.7 °F) 05/25/22 1511   Pulse 76 05/25/22 1641   Resp 20 05/25/22 1511   SpO2 96 % 05/25/22 1757         Event Time   Out of Recovery 05/24/2022 16:15:00         Pain/Dominique Score: Pain Rating Prior to Med Admin: 7 (5/25/2022  8:22 AM)  Pain Rating Post Med Admin: 7 (5/25/2022  9:22 AM)  Dominique Score: 9 (5/24/2022  8:34 PM)

## 2022-05-25 NOTE — PLAN OF CARE
Problem: Adult Inpatient Plan of Care  Goal: Plan of Care Review  Outcome: Ongoing, Progressing  Goal: Patient-Specific Goal (Individualized)  Outcome: Ongoing, Progressing  Goal: Optimal Comfort and Wellbeing  Outcome: Ongoing, Progressing     Problem: Infection  Goal: Absence of Infection Signs and Symptoms  Outcome: Ongoing, Progressing     POC reviewed. Address questions and concerns. AAOX4. VVS. Remain IV Abx. Cervical collar adjusted per neuro. Pt voiding without difficulty. Pain manage with Oxy x1. Ambulating in room without difficulty. Frequent safety checks performed. Call light in reach.   IV discontinued, cath removed intact

## 2022-05-25 NOTE — PLAN OF CARE
Problem: Physical Therapy  Goal: Physical Therapy Goal  Description: Goals to be met by: 22     Patient will increase functional independence with mobility by performin. Supine to sit with Modified [place]  2. Sit to supine with Modified [place]  3. Sit to stand transfer with [place]  4. Gait  x >150 feet with Stand-by Assistance using No Assistive Device.   5. Lower extremity exercise program x 20 reps per handout, with independence  6. Ascend/descend 4 stair with left Handrails Stand-by Assistance using No Assistive Device.     Outcome: Ongoing, Progressing   Pt progressing, appropriate goals established

## 2022-05-25 NOTE — PT/OT/SLP EVAL
"Physical Therapy Evaluation and Tx    Patient Name:  Jose Davis   MRN:  [id number]  Admit Date: 5/23/2022  Admitting Diagnosis:  Cervical spinal mass  Length of Stay: 2 days  Recent Surgery: Procedure(s) (LRB):  CORPECTOMY, SPINE, CERVICAL -C5 corpectomy with C4-6 fusion (N/A) 1 Day Post-Op    Recommendations:   Discharge Recommendations:  home health PT   Discharge Equipment Recommendations: grab bar, shower chair   Barriers to discharge: None      Assessment:     Jose Davis is a 76 y.o. male admitted with a medical diagnosis of Cervical spinal mass.  Pt presents with significant functional mobility deficits and is functioning below baseline. Pt presents following C4-C6 fusion. Reports that he has noticed a gradual decline in function over the past ~4 months, but he was still cynthia to walk "an acre" PTA. Pt performed all mobility with SPV-SBA. Pt will continue to benefit from acute PT services in order to maximize safety and (I) with functional mobility.  Recommending HHPT upon discharge to increase patient's independence and safety with functional mobility.     Problem List: impaired endurance, impaired self care skills, impaired functional mobilty, gait instability, impaired balance, decreased lower extremity function, decreased upper extremity function, decreased safety awareness  Rehab Prognosis: Good; patient would benefit from acute skilled PT services to address these deficits and reach maximum level of function.        Plan:   During this hospitalization, patient to be seen 3 x/week to address the above listed problems via gait training, therapeutic activities, therapeutic exercises, neuromuscular re-education  · Plan of Care Expires:  06/22/22    Subjective     Chief Complaint: No chief complaint on file.    Patient/Family Comments/goals: to return home  Pain/Comfort:  · Pain Rating 1: 0/10  · Pain Rating Post-Intervention 1: 0/10    Social History:  Residence: Patient lives with wife in a [facility] with 4 " KRISTEL to enter and L HR  Support available: spouse  Equipment Used: cane, straight  Prior level of function: independent to mod(I) with SPC prn  Objective:     Communicated with RN prior to session.  Patient found up in chair upon PT entry to room.   Additional staff present: N/A    General Precautions: Standard, fall, vision impaired   Orthopedic Precautions:spinal precautions   Braces: Aspen collar   Oxygen Device: Room Air    Exams:  · Cognition:   · AxOx4  · Command following: Follows multistep verbal commands    · Postural Exam:  Patient presented with the following abnormalities:    · -       Forward head  · Sensation:    · -       Intact    · RLE ROM: WFL  · RLE Strength: WFL  · LLE ROM: WFL  · LLE Strength: WFL    Functional Mobility:  Bed Mobility:  Not assessed 2/2 Pt up in chair  Transfers:     Sit to Stand:  supervision with no AD  Gait:   · Distance: ~30 ft  · Level of Assistance:  stand by assistance  · AD used: no AD  · Gait Deviations: decreased speed, step length, swing through, heel strike, forward flexed posture, and downward gaze.   · Comments: PT providing verbal and tactile cues for improved upright posture, gaze direction, and gait fluidity.   Balance:   · Static Sitting: SPV  · Dynamic Sitting: SPV   · Static Standing: SBA  · Dynamic Standing: SBA    Therapeutic Activities & Exercises:      Role of PT in POC   Extensive education regarding spinal precautions   Patient educated on the importance of early mobility to prevent functional decline during hospital stay   Patient was instructed to utilize staff assistance for mobility/transfers.   Patient was educated on PT POC and all questions answered within PT scope of practice.   Patient able to verbalize understanding; will follow-up with pt during current admit for additional questions/concerns within scope of practice.     Outcome Measures:  AM-PAC 6 CLICK MOBILITY  Turning over in bed (including adjusting bedclothes, sheets and  blankets)?: 3  Sitting down on and standing up from a chair with arms (e.g., wheelchair, bedside commode, etc.): 3  Moving from lying on back to sitting on the side of the bed?: 3  Moving to and from a bed to a chair (including a wheelchair)?: 3  Need to walk in hospital room?: 3  Climbing 3-5 steps with a railing?: 3  Basic Mobility Total Score: 18     Patient left up in chair with all lines intact and call button in reach.    GOALS:   Multidisciplinary Problems     Physical Therapy Goals        Problem: Physical Therapy    Goal Priority Disciplines Outcome Goal Variances Interventions   Physical Therapy Goal     PT, PT/OT Ongoing, Progressing     Description: Goals to be met by: 22     Patient will increase functional independence with mobility by performin. Supine to sit with Modified [place]  2. Sit to supine with Modified [place]  3. Sit to stand transfer with [place]  4. Gait  x >150 feet with Stand-by Assistance using No Assistive Device.   5. Lower extremity exercise program x 20 reps per handout, with independence  6. Ascend/descend 4 stair with left Handrails Stand-by Assistance using No Assistive Device.                      History:     Past Medical History:   Diagnosis Date    Enlarged prostate     Hypertension        Past Surgical History:   Procedure Laterality Date    cyst excision from neck      cystoscope      HERNIA REPAIR      nephrectomy  2006    left for a tumor?    PROSTATE SURGERY      ROTATOR CUFF REPAIR      right    SURGICAL REMOVAL OF VERTEBRAL BODY OF CERVICAL SPINE N/A 2022    Procedure: CORPECTOMY, SPINE, CERVICAL -C5 corpectomy with C4-6 fusion;  Surgeon: Yaya Medina MD;  Location: [facility] OR [address] [place] [address];  Service: Neurosurgery;  Laterality: N/A;  neuromonitoring, krista  headset with traction, [place] J brace, c-arm, spinewave    TONSILLECTOMY         Family History   Problem Relation Age of Onset    Cancer Mother         stomach    Heart disease  Father     COPD Father        Social History     Socioeconomic History    Marital status:    Tobacco Use    Smoking status: Never Smoker    Smokeless tobacco: Never Used   Substance and Sexual Activity    Alcohol use: No     Alcohol/week: 0.0 standard drinks    Drug use: No     Time Tracking:     PT Received On: 05/25/22  PT Start Time: 0857     PT Stop Time: 0928  PT Total Time (min): 31 min     Billable Minutes: Evaluation 8, Gait Training 8 and Therapeutic Activity 15    5/25/2022

## 2022-05-25 NOTE — ASSESSMENT & PLAN NOTE
75 yo male with HTN, POD1 s/p ACC C4-C6 with subtotal tumor resection    -Pt remains neurologically stable post-op  -Continue q4h neuro checks while on the floor   -Pt will likely require a second stage surgery for posterior instrumentation  -Post-op xrays pending today  -PT/OT/OOB  -Cervical collar when up or OOB  -Continue current multimodal pain regimen  -HV drain removed today  -Pt due to void today, prn bladder scan  -Continue home HTN regimen   -DVT prophylaxis: MYNOR's, SCD's, SQH  -Bowel regimen: senna BID and miralax daily  -Atelectasis prevention: IS hourly while awake, PT/OT, OOB > 6 hours per day  -Discussed with Dr. Medina

## 2022-05-25 NOTE — PROGRESS NOTES
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 Pt reports pain is moderately controlled. He denies new numbness, pain or tingling in his arms. Denies new weakness. He reports mild dysphagia this am but tolerating po diet. Drain removed this am as it was not holding suction. Rosario removed this am, pt due to void. Xrays pending    Medications:  Continuous Infusions:   sodium chloride 0.9% 100 mL/hr at 05/25/22 0230    sodium chloride 0.9%       Scheduled Meds:   amLODIPine  10 mg Oral Daily    atorvastatin  80 mg Oral Daily    bisacodyL  10 mg Rectal Daily    ceFAZolin (ANCEF) IVPB  2 g Intravenous Q8H    docusate sodium  100 mg Oral BID    donepeziL  10 mg Oral Daily    gabapentin  200 mg Oral QHS    mupirocin   Nasal BID    polyethylene glycol  17 g Oral Daily    traZODone  50 mg Oral QHS     PRN Meds:sodium chloride, sodium chloride, acetaminophen, aluminum-magnesium hydroxide-simethicone, dextrose 10%, dextrose 10%, glucagon (human recombinant), glucose, glucose, glucose, hydrALAZINE, HYDROmorphone, melatonin, morphine, ondansetron, ondansetron, oxyCODONE-acetaminophen, oxyCODONE-acetaminophen, prochlorperazine, senna-docusate 8.6-50 mg, sodium chloride 0.9%     Review of Systems  Objective:     Weight: 56.2 kg (123 lb 15.9 oz)  Body mass index is 21.27 kg/m².  Vital Signs (Most Recent):  Temp: 98.8 °F (37.1 °C) (05/25/22 0721)  Pulse: 71 (05/25/22 0721)  Resp: 18 (05/25/22 0822)  BP: (!) 177/86 (05/25/22 0721)  SpO2: (!) 93 % (05/25/22 0721)   Vital Signs (24h Range):  Temp:  [97.8 °F (36.6 °C)-99.5 °F (37.5 °C)] 98.8 °F (37.1 °C)  Pulse:  [61-90] 71  Resp:  [8-20] 18  SpO2:  [90 %-100 %] 93 %  BP: (121-177)/(65-98) 177/86     Date  05/25/22 0700 - 05/26/22 0659   Shift 3870-4115 4333-7277 7919-4791 24 Hour Total   INTAKE   Shift Total(mL/kg)       OUTPUT   Urine(mL/kg/hr) 700   700   Shift Total(mL/kg) 700(12.4)   700(12.4)   Weight (kg) 56.2 56.2 56.2 56.2       Neurosurgery Physical Exam  General: well developed, well nourished, no distress.   Head: normocephalic, atraumatic  Neurologic: Alert and oriented. Thought content appropriate.  GCS: Motor: 6/Verbal: 5/Eyes: 4 GCS Total: 15  Mental Status: Awake, Alert, Oriented x3  Cranial nerves: face symmetric, tongue midline, CN II-XII grossly intact.   Eyes: pupils equal, round, reactive to light with accomodation, EOMI.   Sensory: intact to light touch throughout  Motor Strength:Moves all extremities spontaneously with good tone.  Full strength upper and lower extremities. No abnormal movements seen.     Strength  Deltoids Triceps Biceps Wrist Extension Wrist Flexion Hand    Upper: R 5/5 5/5 5/5 5/5 5/5 5/5    L 5/5 5/5 5/5 5/5 5/5 5/5     Iliopsoas Quadriceps Knee  Flexion Tibialis  anterior Gastro- cnemius EHL   Lower: R 5/5 5/5 5/5 5/5 5/5 5/5    L 5/5 5/5 5/5 5/5 5/5 5/5     DTR's - 2 + and symmetric in UE and LE  Valentin: positive on the left  Clonus: absent  Babinski: absent  Pulses: 2+ and symmetric radial and dorsalis pedis. No lower extremity edema  Incision clean dry and intact with dermabond    Significant Labs:  Recent Labs   Lab 05/23/22 2123 05/24/22  0519 05/25/22  0321   GLU 84 83 150*    141 138   K 3.6 4.2 4.1    108 108   CO2 26 26 19*   BUN 18 18 18   CREATININE 1.2 1.1 1.0   CALCIUM 9.3 9.4 8.8     Recent Labs   Lab 05/23/22 2123 05/24/22  0519 05/24/22  1204 05/25/22  0322   WBC 6.31 6.93  --  11.38   HGB 12.6* 13.7*  --  12.7*   HCT 38.6* 41.0 34* 36.3*    190  --  197     Recent Labs   Lab 05/23/22 2123 05/24/22  0547   INR 1.1  --    APTT 38.7* 38.8*     Microbiology Results (last 7 days)       Procedure Component Value Units Date/Time     Culture, Anaerobe [[id number]] Collected: 05/24/22 1416    Order Status: Completed Specimen: Back Updated: 05/25/22 0746     Anaerobic Culture Culture in progress    Narrative:      Cervical spine lesion culture    Aerobic culture [[id number]] Collected: 05/24/22 1416    Order Status: Completed Specimen: Bone from Back Updated: 05/25/22 0653     Aerobic Bacterial Culture No growth    Narrative:      Cervical spine lesion culture    Gram stain [[id number]] Collected: 05/24/22 1416    Order Status: Completed Specimen: Back Updated: 05/24/22 1600     Gram Stain Result Rare WBC's      No organisms seen    Narrative:      Cervical spine lesion culture    AFB Culture & Smear [[id number]] Collected: 05/24/22 1416    Order Status: Sent Specimen: Back Updated: 05/24/22 1430    Fungus culture [[id number]] Collected: 05/24/22 1416    Order Status: Sent Specimen: Back Updated: 05/24/22 1429          All pertinent labs from the last 24 hours have been reviewed.    Significant Diagnostics:  I have reviewed all pertinent imaging results/findings within the past 24 hours.    Assessment/Plan:     * Cervical spinal mass  77 yo male with HTN, POD1 s/p ACC C4-C6 with subtotal tumor resection    -Pt remains neurologically stable post-op  -Continue q4h neuro checks while on the floor   -Pt will likely require a second stage surgery for posterior instrumentation  -Post-op xrays pending today  -PT/OT/OOB  -Cervical collar when up or OOB  -Continue current multimodal pain regimen  -HV drain removed today  -Pt due to void today, prn bladder scan  -Continue home HTN regimen   -DVT prophylaxis: MYNOR's, SCD's, SQH  -Bowel regimen: senna BID and miralax daily  -Atelectasis prevention: IS hourly while awake, PT/OT, OOB > 6 hours per day  -Discussed with Dr. Carol Real PA-C  Neurosurgery  [facility] - Telemetry Stepdown

## 2022-05-25 NOTE — SUBJECTIVE & OBJECTIVE
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 Pt reports pain is moderately controlled. He denies new numbness, pain or tingling in his arms. Denies new weakness. He reports mild dysphagia this am but tolerating po diet. Drain removed this am as it was not holding suction. Rosario removed this am, pt due to void. Xrays pending    Medications:  Continuous Infusions:   sodium chloride 0.9% 100 mL/hr at 05/25/22 0230    sodium chloride 0.9%       Scheduled Meds:   amLODIPine  10 mg Oral Daily    atorvastatin  80 mg Oral Daily    bisacodyL  10 mg Rectal Daily    ceFAZolin (ANCEF) IVPB  2 g Intravenous Q8H    docusate sodium  100 mg Oral BID    donepeziL  10 mg Oral Daily    gabapentin  200 mg Oral QHS    mupirocin   Nasal BID    polyethylene glycol  17 g Oral Daily    traZODone  50 mg Oral QHS     PRN Meds:sodium chloride, sodium chloride, acetaminophen, aluminum-magnesium hydroxide-simethicone, dextrose 10%, dextrose 10%, glucagon (human recombinant), glucose, glucose, glucose, hydrALAZINE, HYDROmorphone, melatonin, morphine, ondansetron, ondansetron, oxyCODONE-acetaminophen, oxyCODONE-acetaminophen, prochlorperazine, senna-docusate 8.6-50 mg, sodium chloride 0.9%     Review of Systems  Objective:     Weight: 56.2 kg (123 lb 15.9 oz)  Body mass index is 21.27 kg/m².  Vital Signs (Most Recent):  Temp: 98.8 °F (37.1 °C) (05/25/22 0721)  Pulse: 71 (05/25/22 0721)  Resp: 18 (05/25/22 0822)  BP: (!) 177/86 (05/25/22 0721)  SpO2: (!) 93 % (05/25/22 0721)   Vital Signs (24h Range):  Temp:  [97.8 °F (36.6 °C)-99.5 °F (37.5 °C)] 98.8 °F (37.1 °C)  Pulse:  [61-90] 71  Resp:  [8-20] 18  SpO2:  [90 %-100 %] 93 %  BP: (121-177)/(65-98) 177/86     Date 05/25/22 0700 - 05/26/22 0659   Shift 1084-0593 6097-6580 7691-5935 24 Hour Total   INTAKE   Shift Total(mL/kg)       OUTPUT   Urine(mL/kg/hr) 700   700   Shift Total(mL/kg) 700(12.4)   700(12.4)   Weight (kg) 56.2 56.2 56.2 56.2       Neurosurgery Physical  Exam  General: well developed, well nourished, no distress.   Head: normocephalic, atraumatic  Neurologic: Alert and oriented. Thought content appropriate.  GCS: Motor: 6/Verbal: 5/Eyes: 4 GCS Total: 15  Mental Status: Awake, Alert, Oriented x3  Cranial nerves: face symmetric, tongue midline, CN II-XII grossly intact.   Eyes: pupils equal, round, reactive to light with accomodation, EOMI.   Sensory: intact to light touch throughout  Motor Strength:Moves all extremities spontaneously with good tone.  Full strength upper and lower extremities. No abnormal movements seen.     Strength  Deltoids Triceps Biceps Wrist Extension Wrist Flexion Hand    Upper: R 5/5 5/5 5/5 5/5 5/5 5/5    L 5/5 5/5 5/5 5/5 5/5 5/5     Iliopsoas Quadriceps Knee  Flexion Tibialis  anterior Gastro- cnemius EHL   Lower: R 5/5 5/5 5/5 5/5 5/5 5/5    L 5/5 5/5 5/5 5/5 5/5 5/5     DTR's - 2 + and symmetric in UE and LE  Valentin: positive on the left  Clonus: absent  Babinski: absent  Pulses: 2+ and symmetric radial and dorsalis pedis. No lower extremity edema  Incision clean dry and intact with dermabond    Significant Labs:  Recent Labs   Lab 05/23/22 2123 05/24/22  0519 05/25/22  0321   GLU 84 83 150*    141 138   K 3.6 4.2 4.1    108 108   CO2 26 26 19*   BUN 18 18 18   CREATININE 1.2 1.1 1.0   CALCIUM 9.3 9.4 8.8     Recent Labs   Lab 05/23/22 2123 05/24/22  0519 05/24/22  1204 05/25/22  0322   WBC 6.31 6.93  --  11.38   HGB 12.6* 13.7*  --  12.7*   HCT 38.6* 41.0 34* 36.3*    190  --  197     Recent Labs   Lab 05/23/22 2123 05/24/22  0547   INR 1.1  --    APTT 38.7* 38.8*     Microbiology Results (last 7 days)       Procedure Component Value Units Date/Time    Culture, Anaerobe [[id number]] Collected: 05/24/22 1416    Order Status: Completed Specimen: Back Updated: 05/25/22 0746     Anaerobic Culture Culture in progress    Narrative:      Cervical spine lesion culture    Aerobic culture [[id number]] Collected: 05/24/22  1416    Order Status: Completed Specimen: Bone from Back Updated: 05/25/22 0653     Aerobic Bacterial Culture No growth    Narrative:      Cervical spine lesion culture    Gram stain [[id number]] Collected: 05/24/22 1416    Order Status: Completed Specimen: Back Updated: 05/24/22 1600     Gram Stain Result Rare WBC's      No organisms seen    Narrative:      Cervical spine lesion culture    AFB Culture & Smear [[id number]] Collected: 05/24/22 1416    Order Status: Sent Specimen: Back Updated: 05/24/22 1430    Fungus culture [[id number]] Collected: 05/24/22 1416    Order Status: Sent Specimen: Back Updated: 05/24/22 1429          All pertinent labs from the last 24 hours have been reviewed.    Significant Diagnostics:  I have reviewed all pertinent imaging results/findings within the past 24 hours.

## 2022-05-25 NOTE — PLAN OF CARE
[facility] - Telemetry Stepdown  Initial Discharge Assessment       Primary Care Provider: FRANCISCO Son    Admission Diagnosis: Cervical spinal mass [G95.89]    Admission Date: 5/23/2022  Expected Discharge Date: 5/27/2022    Discharge Barriers Identified: None    Payor: HU[employer] MANAGED MEDICARE / Plan: HU[employer] MEDICARE HMO / Product Type: Capitation /     Extended Emergency Contact Information  Primary Emergency Contact: CarlostomErika   [place]  Mobile Phone: [phone]  Relation: Daughter  Secondary Emergency Contact: Karli Davis  Relation: Spouse    Discharge Plan A: Home with family  Discharge Plan B: Home Health      [id number] - [place], [place] - [address]  [address] [place] [address]  Phone: [phone] Fax: [phone]      Initial Assessment (most recent)     Adult Discharge Assessment - 05/25/22 1138        Discharge Assessment    Assessment Type Discharge Planning Assessment     Confirmed/corrected address, phone number and insurance Yes     Confirmed Demographics Correct on Facesheet     Source of Information patient     Communicated LAUREN with patient/caregiver Yes     Reason For Admission Cervical spine mass     Lives With spouse     Do you expect to return to your current living situation? Yes     Prior to hospitilization cognitive status: Alert/Oriented     Current cognitive status: Alert/Oriented     Walking or Climbing Stairs Difficulty none     Dressing/Bathing Difficulty none     Equipment Currently Used at Home cane, straight     Readmission within 30 days? No     Patient currently being followed by outpatient case management? No     Do you currently have service(s) that help you manage your care at home? No     Do you take prescription medications? Yes     Do you have prescription coverage? Yes     Do you have any problems affording any of your prescribed medications? No     Is the patient taking medications as  prescribed? yes     How do you get to doctors appointments? family or friend will provide     Are you on dialysis? No     Do you take coumadin? No     Discharge Plan A Home with family     Discharge Plan B Home Health     DME Needed Upon Discharge  other (see comments)   TBD    Discharge Plan discussed with: Patient     Discharge Barriers Identified None                 Tracy Sheriff, RN  Ext [phone]

## 2022-05-25 NOTE — PLAN OF CARE
Problem: Occupational Therapy  Goal: Occupational Therapy Goal  Outcome: Met   Evaluation/Discharge only. No acute OT needs at this time. No goals established. Re-consult if situation changes.

## 2022-05-25 NOTE — HOSPITAL COURSE
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 Pt reports pain is moderately controlled. He denies new numbness, pain or tingling in his arms. Denies new weakness. He reports mild dysphagia this am but tolerating po diet. Drain removed this am as it was not holding suction. Rosario removed this am, pt due to void. Xrays pending  5/26: POD2. Patient with neck swelling and dysphagia overnight, resolved with decadron 8mg IV x once. Patient doing well this morning. Post op XR with hardware in place. Tolerating PO diet and voiding spontaneously. PT/OT recommending home with home health. Discharge instructions given verbally to patient. All of his questions were answered. He is encouraged to call the clinic with any questions or concerns prior to follow up appt.

## 2022-05-26 LAB
ANION GAP SERPL CALC-SCNC: 12 MMOL/L (ref 8–16)
BASOPHILS # BLD AUTO: 0.02 K/UL (ref 0–0.2)
BASOPHILS NFR BLD: 0.1 % (ref 0–1.9)
BUN SERPL-MCNC: 16 MG/DL (ref 8–23)
CALCIUM SERPL-MCNC: 9.1 MG/DL (ref 8.7–10.5)
CHLORIDE SERPL-SCNC: 104 MMOL/L (ref 95–110)
CO2 SERPL-SCNC: 22 MMOL/L (ref 23–29)
CREAT SERPL-MCNC: 1.1 MG/DL (ref 0.5–1.4)
DIFFERENTIAL METHOD: ABNORMAL
EOSINOPHIL # BLD AUTO: 0 K/UL (ref 0–0.5)
EOSINOPHIL NFR BLD: 0.1 % (ref 0–8)
ERYTHROCYTE [DISTWIDTH] IN BLOOD BY AUTOMATED COUNT: 12.3 % (ref 11.5–14.5)
EST. GFR  (AFRICAN AMERICAN): >60 ML/MIN/1.73 M^2
EST. GFR  (NON AFRICAN AMERICAN): >60 ML/MIN/1.73 M^2
GLUCOSE SERPL-MCNC: 133 MG/DL (ref 70–110)
HCT VFR BLD AUTO: 37.3 % (ref 40–54)
HGB BLD-MCNC: 12.7 G/DL (ref 14–18)
IMM GRANULOCYTES # BLD AUTO: 0.08 K/UL (ref 0–0.04)
IMM GRANULOCYTES NFR BLD AUTO: 0.5 % (ref 0–0.5)
LYMPHOCYTES # BLD AUTO: 0.6 K/UL (ref 1–4.8)
LYMPHOCYTES NFR BLD: 4.1 % (ref 18–48)
MCH RBC QN AUTO: 29.8 PG (ref 27–31)
MCHC RBC AUTO-ENTMCNC: 34 G/DL (ref 32–36)
MCV RBC AUTO: 88 FL (ref 82–98)
MONOCYTES # BLD AUTO: 0.3 K/UL (ref 0.3–1)
MONOCYTES NFR BLD: 1.8 % (ref 4–15)
NEUTROPHILS # BLD AUTO: 13.9 K/UL (ref 1.8–7.7)
NEUTROPHILS NFR BLD: 93.4 % (ref 38–73)
NRBC BLD-RTO: 0 /100 WBC
PLATELET # BLD AUTO: 183 K/UL (ref 150–450)
PMV BLD AUTO: 11.4 FL (ref 9.2–12.9)
POTASSIUM SERPL-SCNC: 4.2 MMOL/L (ref 3.5–5.1)
RBC # BLD AUTO: 4.26 M/UL (ref 4.6–6.2)
SODIUM SERPL-SCNC: 138 MMOL/L (ref 136–145)
WBC # BLD AUTO: 14.91 K/UL (ref 3.9–12.7)

## 2022-05-26 PROCEDURE — 20600001 HC STEP DOWN PRIVATE ROOM

## 2022-05-26 PROCEDURE — 1111F DSCHRG MED/CURRENT MED MERGE: CPT | Mod: CPTII,,, | Performed by: PHYSICIAN ASSISTANT

## 2022-05-26 PROCEDURE — 85025 COMPLETE CBC W/AUTO DIFF WBC: CPT | Performed by: STUDENT IN AN ORGANIZED HEALTH CARE EDUCATION/TRAINING PROGRAM

## 2022-05-26 PROCEDURE — 25000003 PHARM REV CODE 250: Performed by: STUDENT IN AN ORGANIZED HEALTH CARE EDUCATION/TRAINING PROGRAM

## 2022-05-26 PROCEDURE — 80048 BASIC METABOLIC PNL TOTAL CA: CPT | Performed by: STUDENT IN AN ORGANIZED HEALTH CARE EDUCATION/TRAINING PROGRAM

## 2022-05-26 PROCEDURE — 94761 N-INVAS EAR/PLS OXIMETRY MLT: CPT

## 2022-05-26 PROCEDURE — 36415 COLL VENOUS BLD VENIPUNCTURE: CPT | Performed by: STUDENT IN AN ORGANIZED HEALTH CARE EDUCATION/TRAINING PROGRAM

## 2022-05-26 PROCEDURE — 99024 PR POST-OP FOLLOW-UP VISIT: ICD-10-PCS | Mod: ,,, | Performed by: PHYSICIAN ASSISTANT

## 2022-05-26 PROCEDURE — 1111F PR DISCHARGE MEDS RECONCILED W/ CURRENT OUTPATIENT MED LIST: ICD-10-PCS | Mod: CPTII,,, | Performed by: PHYSICIAN ASSISTANT

## 2022-05-26 PROCEDURE — 99024 POSTOP FOLLOW-UP VISIT: CPT | Mod: ,,, | Performed by: PHYSICIAN ASSISTANT

## 2022-05-26 PROCEDURE — 63600175 PHARM REV CODE 636 W HCPCS: Performed by: PHYSICIAN ASSISTANT

## 2022-05-26 RX ORDER — OXYCODONE AND ACETAMINOPHEN 5; 325 MG/1; MG/1
1 TABLET ORAL EVERY 4 HOURS PRN
Qty: 60 TABLET | Refills: 0 | Status: ON HOLD | OUTPATIENT
Start: 2022-05-26 | End: 2022-08-30 | Stop reason: HOSPADM

## 2022-05-26 RX ORDER — METHOCARBAMOL 750 MG/1
750 TABLET, FILM COATED ORAL 4 TIMES DAILY PRN
Qty: 60 TABLET | Refills: 1 | Status: ON HOLD | OUTPATIENT
Start: 2022-05-26 | End: 2022-08-30 | Stop reason: HOSPADM

## 2022-05-26 RX ORDER — GABAPENTIN 100 MG/1
200 CAPSULE ORAL 3 TIMES DAILY
Start: 2022-05-26 | End: 2022-08-12 | Stop reason: SDUPTHER

## 2022-05-26 RX ADMIN — POLYETHYLENE GLYCOL 3350 17 G: 17 POWDER, FOR SOLUTION ORAL at 09:05

## 2022-05-26 RX ADMIN — ATORVASTATIN CALCIUM 80 MG: 20 TABLET, FILM COATED ORAL at 09:05

## 2022-05-26 RX ADMIN — GABAPENTIN 200 MG: 100 CAPSULE ORAL at 03:05

## 2022-05-26 RX ADMIN — DOCUSATE SODIUM 100 MG: 100 CAPSULE ORAL at 09:05

## 2022-05-26 RX ADMIN — DONEPEZIL HYDROCHLORIDE 10 MG: 5 TABLET ORAL at 09:05

## 2022-05-26 RX ADMIN — AMLODIPINE BESYLATE 10 MG: 10 TABLET ORAL at 09:05

## 2022-05-26 RX ADMIN — MUPIROCIN: 20 OINTMENT TOPICAL at 09:05

## 2022-05-26 RX ADMIN — GABAPENTIN 200 MG: 100 CAPSULE ORAL at 09:05

## 2022-05-26 RX ADMIN — HEPARIN SODIUM 5000 UNITS: 5000 INJECTION INTRAVENOUS; SUBCUTANEOUS at 05:05

## 2022-05-26 RX ADMIN — HEPARIN SODIUM 5000 UNITS: 5000 INJECTION INTRAVENOUS; SUBCUTANEOUS at 03:05

## 2022-05-26 RX ADMIN — TRAZODONE HYDROCHLORIDE 50 MG: 50 TABLET ORAL at 09:05

## 2022-05-26 RX ADMIN — HEPARIN SODIUM 5000 UNITS: 5000 INJECTION INTRAVENOUS; SUBCUTANEOUS at 09:05

## 2022-05-26 NOTE — HPI
Patient is a 77 y/o male who presents today for elective C5 corpectomy and C4-C5 anterior fusion for resection/biopsy of C5 vertebral body mass. Please see below for Dr. Medina's clinic note:    75 yo male with history of HTN, Lupus, and Hep C who presents referred by Dr. Johns at [place] for evaluation of cervical spine mass. He presented to [facility] 3 weeks ago with complaint of dizziness that started upon waking. During the workup for this dizziness, CT cervical spine was obtained which revealed a C5 expansile vertebral body mass was found with extension into the pedicle and the facet on the right. MRI cervical spine was obtained W WO contrast which confirmed enhancing mass lesion and he was transferred to [place] for NSGY evaluation. The work-up was completed with MRI brain as well as CT c/a/p which were both negative. CTA was negative for right vertebral involvement. No emergent intervention was indicated and he was discharged with close outpatient follow up for evaluation here at [facility] due to the complexity of the case.      The patient reports the dizziness he was experiencing prior to his admission was attributed to vertigo and this has significantly improved since his discharge. He does note feeling off balance when he walks and is now using two canes to steady himself. He reports right sided neck stiffness as well as intermittent numbness sensations in his bilateral hands for the past several months but cannot localize this. He denies shooting pain into his arms, new weakness, or difficulty with dexterity. He denies significant weight loss. His mother had GI cancer. He is a nonsmoker. He takes ASA 81mg as a preventative measure.

## 2022-05-26 NOTE — DISCHARGE INSTRUCTIONS
Post op Spine Patient Instructions    Activity Restrictions:  [x]  Return to work will be determined on an individual basis.  [x]  No lifting greater than 5-10 pounds.  [x]  Avoid bending and twisting the area of your surgery more than 45 degrees from neutral position in any direction.  [x]  No driving or operating machinery:  [x]  until cleared by your surgeon.  [x]  while taking narcotic pain medications or muscle relaxants.  [x]  Wear your brace at all times except when lying in bed, showering, using the restroom, or performing hygiene tasks.   [x]  Increase ambulation over the next 2 weeks so that you are walking 2 miles per day at 2 weeks post-operatively.  [x]  Walk on paved surfaces only. It is okay to walk up and down stairs while holding onto a side rail.  [x]  No sexual activity for 6 weeks.    Discharge Medication/Follow-up:  [x]  Please refer to discharge medication reconciliation form.  [x]  Do not take any OTC products containing acetaminophen (tylenol) at the same time as you take your narcotic pain medication. Medications that may contain acetaminophen include but are not limited to: Excedrin and other headache medications, arthritis medications, cold and sinus medications, etc. Please review the list of active ingredients in any OTC medication prior to taking it.  [x]  Do not take ANY Aspirin or Aspirin containing products for 2 weeks after surgery.   [x]  Do not take ANY herbal supplements for 2 weeks after surgery.    [x]  Do not take ANY non-steroidal anti-inflammatory drugs (NSAIDS), including the following: ibuprofen, naprosyn, Aleve, Advil, Indocin, Mobic, or Celebrex for 12 weeks after surgery.   [x]  Prescriptions for appropriate medication will be given upon discharge.  [x]  Take docusate (Colace 100 mg): take one capsule a day as needed for constipation. You can get this over the counter.  [x]  Follow-up appointment:  [x]  10-14 days post-op for wound check by physician  assistant/nurse  [x]  4-6 weeks with MD:  [x]  with x-rays  [x]  An appointment will be mailed to you.    Wound Care:  [x]  No bandage required. Keep your incision open to the air. You have dermabond (skin glue) covering your incision. This will begin to flake off over the next 2 weeks. Do not remove this on your own, allow it to peel off. Do not apply ointments or creams to your incision.  [x]  You may shower on the 2nd day after your surgery. Keep the incision clean and dry at all times. Do not allow the force of water to hit the incision. If the incision gets damp, pat it dry. Do not rub or scrub the incision.  [x]  You cannot take a bath until 8 weeks after surgery.    Call your doctor or go to the Emergency Room for any signs of infection, including: increased redness, drainage, pain, or fever (temperature ?101.5 for 24 hours). Call your doctor or go to the Emergency Room if there are any localized neurological changes; problems with speech, vision, numbness, tingling, weakness, or severe headache; inability to control urination or bowel movements; inability to urinate; or for other concerns.        Special Instructions:  [x]  No use of tobacco products.  [x]  Diet: Please eat a regular diet as tolerated.      Physicians need 3 days' notice for pain medicine to be refilled. Pain medicine will only be refilled between 8 AM and 5 PM, Monday through Friday, due to Food and Drug Administration regulation of documentation.    If you have any questions about this form, please call [phone].    Form No. 55904 (Revised 10/31/2013)

## 2022-05-26 NOTE — PLAN OF CARE
[facility] - Telemetry Stepdown  Discharge Final Note    Primary Care Provider: FRANCISCO Son    Expected Discharge Date: 5/26/2022       Future Appointments   Date Time Provider Department Center   6/6/2022 10:00 AM Essence Salinas RN [facility]   7/5/2022 11:15 AM [facility]   7/5/2022 12:15 PM Yaya Medina MD [facility]         Final Discharge Note (most recent)     Final Note - 05/26/22 1502        Final Note    Assessment Type Final Discharge Note     Anticipated Discharge Disposition Home-Health Care Svc     What phone number can be called within the next 1-3 days to see how you are doing after discharge? [phone]     Hospital Resources/Appts/Education Provided Appointments scheduled and added to AVS        Post-Acute Status    Post-Acute Authorization Home Health     Home Health Status Set-up Complete/Auth obtained   [facility]                Important Message from Medicare  Important Message from Medicare regarding Discharge Appeal Rights: Given to patient/caregiver, Explained to patient/caregiver, Signed/date by patient/caregiver     Date IMM was signed: 05/26/22  Time IMM was signed: 1056    Contact Info     [facility] Neurosurgery 8th Fl   Specialty: Neurosurgery    [address]  [place] [address]   Phone: [phone]       Next Steps: Follow up on 6/6/2022    Instructions: at 10:00 AM    Yaya Medina MD   Specialty: Neurosurgery    [address]  [place] [address]   Phone: [phone]       Next Steps: Follow up on 7/5/2022    Instructions: at 12:15 with x-rays prior to appointment    FRANCISCO Son   Specialty: Family Medicine   Relationship: PCP - General    [id number]   [facility] [address]   Phone: [phone]       Next Steps: Follow up on 6/13/2022    Instructions: Established pt's hospital f/u visit on 6/13/22 @ 1:00pm. Please bring discharge summary, ID, insurance card,  and medication list.    [facility]   Specialty: Home Health Services    [address] [place] [address]   Phone: [phone]       Next Steps: Follow up            Tracy Sheriff RN  Ext [phone]

## 2022-05-26 NOTE — OP NOTE
[facility] - [facility] Stepdown  Neurosurgery  Operative Note    OP Note      Date of Procedure: 5/24/2022       Pre-Operative Diagnosis: Cervical spinal mass [G95.89]    Post-Operative Diagnosis: Post-Op Diagnosis Codes:     * Cervical spinal mass [G95.89]    Anesthesia: General    Procedures performed:1.  Anterior approach for complete C5 corpectomy 2. Resection of spinal and epidural tumor 3. Expandable peek interbody device for see forth to C6 fusion 4. Anterior instrumentation 5. Morselized allograft 6. Microsurgical technique 7. Application of cranial tongs for traction    Surgeon: Yaya Medina MD    Assistant::  Sean Irwin MD    Indication for Procedure:  This is a 76-year-old male with enhancing mass of uncertain etiology invading the C5 vertebral body and with epidural compression.  Metastatic workup was negative we felt we needed to resect the lesion and stabilize the segment.    Operative Note:  Patient was anesthetized intubated by anesthesia.  Patient was placed in a supine position.  The Lito headset was set up cranial tongs were put in position and in the neutral position.  5 lb of traction was applied.  The neck was prepped and draped after localizing with fluoroscopy.  We did a left-sided approach because the lesion was asymmetric to the contralateral side.  Transverse incision was carried out.  Soft tissue dissection was taken to expose the prevertebral space mobilizing the carotid sheath and contents laterally the trachea and esophagus medially.  The prevertebral fascia was incised.  We we localized with fluoroscopy both the C4-5 and C5-6 disc space.  Longus colli was mobilized the side.  Self-retaining retractors put in.  We brought a microscope and microsurgical technique we perform a C5-6 and C4-5 diskectomy.  We then added another 5 lb of traction.  We then used M8 drill along with Kerrisons pituitaries and Leksell to remove the entire anterior C5 vertebral body.  Multiple specimen was  sent for pathology.  We got down to the tumor that was in the body resected that then got down to the epidural component of the tumor which was compressing on the thecal sac we resected that using microsurgical technique.  Once were happy with the peeling the tumor off of the epidural space and were happy with the decompression of the central canal and bilateral foramen, we prepared the endplate.  We placed expandable cage with morselized allograft and placed expanded into position.  We took some of the traction off of the head and then placed the plate and then drilled and placed variable angle screws into the vertebral body of C4 fixed angle screws into vertebral body of see 6. We packed morselized allograft behind the plate we obtained hemostasis.  We placed vancomycin powder in its some Depo-Medrol on the esophagus.  We we placed Hemovac drain in position.  We irrigated and closed the rest of the wound in layers.  A sterile dressing was put in place.  Neuro monitor was stable throughout the whole case.  Patient was extubated brought to recovery room and taken out of the cranial tongs.      EBL:  100 cc  Specimen Sent:  A multiple pathology specimen spinal tumor

## 2022-05-26 NOTE — DISCHARGE SUMMARY
[facility] - Telemetry Stepdown  Neurosurgery  Discharge Summary      Patient Name: Jose Davis  MRN: [id number]  Admission Date: 5/23/2022  Hospital Length of Stay: 3 days  Discharge Date and Time:  05/26/2022 11:00 AM  Attending Physician: Yaya Medina MD   Discharging Provider: Cathi Tobar PA-C  Primary Care Provider: FRANCISCO Son    HPI:   Patient is a 77 y/o male who presents today for elective C5 corpectomy and C4-C5 anterior fusion for resection/biopsy of C5 vertebral body mass. Please see below for Dr. Medina's clinic note:    77 yo male with history of HTN, Lupus, and Hep C who presents referred by Dr. Johns at [facility] for evaluation of cervical spine mass. He presented to [facility] weeks ago with complaint of dizziness that started upon waking. During the workup for this dizziness, CT cervical spine was obtained which revealed a C5 expansile vertebral body mass was found with extension into the pedicle and the facet on the right. MRI cervical spine was obtained W WO contrast which confirmed enhancing mass lesion and he was transferred to [facility] for NSGY evaluation. The work-up was completed with MRI brain as well as CT c/a/p which were both negative. CTA was negative for right vertebral involvement. No emergent intervention was indicated and he was discharged with close outpatient follow up for evaluation here at [facility] due to the complexity of the case.      The patient reports the dizziness he was experiencing prior to his admission was attributed to vertigo and this has significantly improved since his discharge. He does note feeling off balance when he walks and is now using two canes to steady himself. He reports right sided neck stiffness as well as intermittent numbness sensations in his bilateral hands for the past several months but cannot localize this. He denies shooting pain into his arms, new weakness, or difficulty with dexterity. He denies  significant weight loss. His mother had GI cancer. He is a nonsmoker. He takes ASA 81mg as a preventative measure.      Procedure(s) (LRB):  CORPECTOMY, SPINE, CERVICAL -C5 corpectomy with C4-6 fusion (N/A)     Hospital Course: 5/25: POD1 s/p ACC C4-C6 with subtotal tumor resection. Pt reports pain is moderately controlled. He denies new numbness, pain or tingling in his arms. Denies new weakness. He reports mild dysphagia this am but tolerating po diet. Drain removed this am as it was not holding suction. Rosario removed this am, pt due to void. Xrays pending  5/26: POD2. Patient with neck swelling and dysphagia overnight, resolved with decadron 8mg IV x once. Patient doing well this morning. Post op XR with hardware in place. Tolerating PO diet and voiding spontaneously. PT/OT recommending home with home health. Discharge instructions given verbally to patient. All of his questions were answered. He is encouraged to call the clinic with any questions or concerns prior to follow up appt.      Physical exam prior to hospital discharge 5/26/2022:  General: well developed, well nourished, no distress.   Head: normocephalic, atraumatic  Neurologic: Alert and oriented. Thought content appropriate.  GCS: Motor: 6/Verbal: 5/Eyes: 4 GCS Total: 15  Mental Status: Awake, Alert, Oriented x3  Cranial nerves: face symmetric, tongue midline, CN II-XII grossly intact.   Eyes: pupils equal, round, reactive to light with accomodation, EOMI.   Abdomen: soft, non-distended, non TTP  Sensory: intact to light touch throughout  Motor Strength:Moves all extremities spontaneously with good tone.  Full strength upper and lower extremities. No abnormal movements seen.      Strength   Deltoids Triceps Biceps Wrist Extension Wrist Flexion Hand    Upper: R 5/5 5/5 5/5 5/5 5/5 5/5     L 5/5 5/5 5/5 5/5 5/5 5/5       Iliopsoas Quadriceps Knee  Flexion Tibialis  anterior Gastro- cnemius EHL   Lower: R 5/5 5/5 5/5 5/5 5/5 5/5     L 5/5 5/5 5/5  5/5 5/5 5/5      Incision clean dry and intact with dermabond, no swelling/erythema/drainage. Aspen collar well-fitted    Goals of Care Treatment Preferences:  Code Status: Full Code      Consults: PT/OT    Significant Diagnostic Studies: Labs:   BMP:   Recent Labs   Lab 05/25/22  0321 05/26/22  0302   * 133*    138   K 4.1 4.2    104   CO2 19* 22*   BUN 18 16   CREATININE 1.0 1.1   CALCIUM 8.8 9.1   , CBC   Recent Labs   Lab 05/25/22  0322 05/26/22  0302   WBC 11.38 14.91*   HGB 12.7* 12.7*   HCT 36.3* 37.3*    183    and All labs within the past 24 hours have been reviewed  Radiology:   Narrative & Impression  EXAMINATION:  XR CERVICAL SPINE AP LATERAL     CLINICAL HISTORY:  s/p fusion;     FINDINGS:  Cervical spine: There is a right TSA in place good alignment no complication.     There is C5 corpectomy with vertebral body screws and a plate between C4 and C6.  There is mild DJD.  Alignment is normal no hardware failure is seen.  Posterior elements are intact.  Odontoid is intact.     Impression:     Postsurgical change as above without complication.        Electronically signed by: Oswaldo Appiah MD  Date:                                            05/25/2022  Time:                                           12:05    Pending Diagnostic Studies:     Procedure Component Value Units Date/Time    COVID-19 Routine Screening [[id number]] Collected: 05/24/22 0645    Order Status: Sent Lab Status: In process Updated: 05/24/22 0649    Specimen: Nasopharyngeal     Specimen to Pathology, Surgery Neurosurgery [[id number]] Collected: 05/24/22 1421    Order Status: Sent Lab Status: In process Updated: 05/25/22 0056    Specimen: Tissue         Final Active Diagnoses:    Diagnosis Date Noted POA    PRINCIPAL PROBLEM:  Cervical spinal mass [G95.89] 05/24/2022 Yes      Problems Resolved During this Admission:      Discharged Condition: stable     Disposition: Home-Health Care [facility]    Follow Up:   Follow-up  Information     [facility] - Neurosurgery 8th Fl Follow up on 6/6/2022.    Specialty: Neurosurgery  Why: at 10:00 AM  Contact information:  [address]  [facility] [address] [phone]  Additional information:  8th Floor Clinic [place]   Please park in [facility].   Check in desk is located in the lobby. Please take the C elevator to 8th floor which opens to the lobby.           Yaya Medina MD Follow up on 7/5/2022.    Specialty: Neurosurgery  Why: at 12:15 with x-rays prior to appointment  Contact information:  [address]  [facility] [address]  [phone]                       Patient Instructions:      Ambulatory referral/consult to Home Health   Standing Status: Future   Referral Priority: Routine Referral Type: Home Health   Referral Reason: Specialty Services Required   Requested Specialty: Home Health Services   Number of Visits Requested: 1     Medications:  Reconciled Home Medications:      Medication List      START taking these medications    methocarbamoL 750 MG Tab  Commonly known as: ROBAXIN  Take 1 tablet (750 mg total) by mouth 4 (four) times daily as needed (muscle spasms).     oxyCODONE-acetaminophen 5-325 mg per tablet  Commonly known as: PERCOCET  Take 1 tablet by mouth every 4 (four) hours as needed for Pain.        CHANGE how you take these medications    gabapentin 100 MG capsule  Commonly known as: NEURONTIN  Take 2 capsules (200 mg total) by mouth 3 (three) times daily.  What changed: when to take this        CONTINUE taking these medications    amLODIPine 10 MG tablet  Commonly known as: NORVASC  Take 10 mg by mouth.     aspirin 81 MG EC tablet  Commonly known as: ECOTRIN  Take 81 mg by mouth once daily.     docusate sodium 100 MG capsule  Commonly known as: COLACE  Take 1 capsule (100 mg total) by mouth 2 (two) times daily. Use while taking oxycodone     donepeziL 10 MG tablet  Commonly known as: ARICEPT  Take 10 mg by mouth once daily.     hydrocortisone 2.5  % cream  APPLY TO AFFECTED AREA TWICE A DAY     hydrocortisone-pramoxine 2.5-1 % Crea  Commonly known as: ANALPRAM-HC  INSERT BY RECTUM 3 TIMES A DAY AS NEEDED HEMORRHOIDS     rosuvastatin 20 MG tablet  Commonly known as: CRESTOR  Take 20 mg by mouth once daily.     traZODone 50 MG tablet  Commonly known as: DESYREL  Take 50 mg by mouth every evening.        STOP taking these medications    ADVIL PM ORAL            Cathi Tobar PA-C  Neurosurgery  [facility] - Telemetry Stepdown

## 2022-05-26 NOTE — NURSING
Place call neurosurgery due pt feeling like neck is swelling. Loose cervical collar per pt request. Report to on-coming nurse

## 2022-05-26 NOTE — PLAN OF CARE
Pt's [facility] wc van was canceled as earliest pickup was 1230am and Pt was not agreeable to this time. NS team updated and dc was canceled.

## 2022-05-26 NOTE — PLAN OF CARE
[facility] - Telemetry Stepdown    HOME HEALTH ORDERS  FACE TO FACE ENCOUNTER    Patient Name: Jose Davis  YOB: 1945    PCP: FRANCISCO Son   PCP Address: [address] [place] [address] / Murray JOHNNA Iniguez*  PCP Phone Number: [phone]  PCP Fax: [phone]    Encounter Date: 05/26/2022    Admit to Home Health    Diagnoses:  Active Hospital Problems    Diagnosis  POA    *Cervical spinal mass [G95.89]  Yes      Resolved Hospital Problems   No resolved problems to display.       Future Appointments   Date Time Provider Department Center   6/6/2022 10:00 AM Essence Salinas RN [facility] NEUROS8 [facility]   7/5/2022 11:15 AM NOM OIC-XRAY [facility]   7/5/2022 12:15 PM Yaya Medina MD [facility] NEUROS8 [facility]      Follow-up Information     [facility] - Neurosurgery 8th Fl Follow up on 6/6/2022.    Specialty: Neurosurgery  Why: at 10:00 AM  Contact information:  [address]  [facility] [id number] [phone]  Additional information:  8th Floor Clinic [place]   Please park in [facility].   Check in desk is located in the lobby. Please take the C elevator to 8th floor which opens to the lobby.           Yaya Medina MD Follow up on 7/5/2022.    Specialty: Neurosurgery  Why: at 12:15 with x-rays prior to appointment  Contact information:  [address]  [facility] [address]  [phone]                           I have seen and examined this patient face to face today. My clinical findings that support the need for the home health skilled services and home bound status are the following:  Weakness/numbness causing balance and gait disturbance due to Surgery making it taxing to leave home.    Allergies:Review of patient's allergies indicates:  No Known Allergies    Diet: regular diet    Activities: activity as tolerated    Nursing:   SN to complete comprehensive assessment including routine vital signs. Instruct on disease process and s/s of  complications to report to MD. Review/verify medication list sent home with the patient at time of discharge  and instruct patient/caregiver as needed. Frequency may be adjusted depending on start of care date. If patient has enteral feeding tube (NG, PEG, J-tube, G-tube), flush tube before and after feeding and/or medication administration with 20-30 mL of water.    Notify MD if SBP > 160 or < 90; DBP > 90 or < 50; HR > 120 or < 50; Temp > 101; Other:          CONSULTS:    Physical Therapy to evaluate and treat. Evaluate for home safety and equipment needs; Establish/upgrade home exercise program. Perform / instruct on therapeutic exercises, gait training, transfer training, and Range of Motion.  Occupational Therapy to evaluate and treat. Evaluate home environment for safety and equipment needs. Perform/Instruct on transfers, ADL training, ROM, and therapeutic exercises.    MISCELLANEOUS CARE:  N/A    WOUND CARE ORDERS  no      Medications: Review discharge medications with patient and family and provide education.      Current Discharge Medication List      START taking these medications    Details   methocarbamoL (ROBAXIN) 750 MG Tab Take 1 tablet (750 mg total) by mouth 4 (four) times daily as needed (muscle spasms).  Qty: 60 tablet, Refills: 1      oxyCODONE-acetaminophen (PERCOCET) 5-325 mg per tablet Take 1 tablet by mouth every 4 (four) hours as needed for Pain.  Qty: 60 tablet, Refills: 0    Comments: Quantity prescribed more than 7 day supply? Yes, quantity medically necessary         CONTINUE these medications which have CHANGED    Details   gabapentin (NEURONTIN) 100 MG capsule Take 2 capsules (200 mg total) by mouth 3 (three) times daily.         CONTINUE these medications which have NOT CHANGED    Details   donepeziL (ARICEPT) 10 MG tablet Take 10 mg by mouth once daily.      traZODone (DESYREL) 50 MG tablet Take 50 mg by mouth every evening.      amLODIPine (NORVASC) 10 MG tablet Take 10 mg by mouth.       aspirin (ECOTRIN) 81 MG EC tablet Take 81 mg by mouth once daily.      docusate sodium (COLACE) 100 MG capsule Take 1 capsule (100 mg total) by mouth 2 (two) times daily. Use while taking oxycodone  Qty: 30 capsule, Refills: 0      hydrocortisone 2.5 % cream APPLY TO AFFECTED AREA TWICE A DAY  Qty: 28.35 g, Refills: 3      hydrocortisone-pramoxine (ANALPRAM-HC) 2.5-1 % Crea INSERT BY RECTUM 3 TIMES A DAY AS NEEDED HEMORRHOIDS      rosuvastatin (CRESTOR) 20 MG tablet Take 20 mg by mouth once daily.         STOP taking these medications       ibuprofen/diphenhydramine cit (ADVIL PM ORAL) Comments:   Reason for Stopping:               I certify that this patient is confined to his home and needs physical therapy and occupational therapy.

## 2022-05-26 NOTE — PLAN OF CARE
05/26/22 1115   Post-Acute Status   Post-Acute Authorization Home Health   Home Health Status Referrals Sent     Sonia Simmons LMSW  [facility]  Ext. [phone]

## 2022-05-26 NOTE — PLAN OF CARE
05/26/22 1415   Post-Acute Status   Post-Acute Authorization Home Health   Home Health Status Set-up Complete/Auth obtained  ([facility]- accepted)     3:01 PM  SW arranged wheelchair transport via [facility]. Requested  time is ASAP.    Sonia Simmons LMSW  [facility]  Ext. [phone]

## 2022-05-26 NOTE — NURSING
Pt awaiting on transportation but pick-up time is 24:30 but requesting to stay until tomorrow. Place call to on-call SW.

## 2022-05-27 VITALS
TEMPERATURE: 98 F | HEART RATE: 59 BPM | OXYGEN SATURATION: 97 % | BODY MASS INDEX: 21.17 KG/M2 | DIASTOLIC BLOOD PRESSURE: 71 MMHG | WEIGHT: 124 LBS | HEIGHT: 64 IN | SYSTOLIC BLOOD PRESSURE: 155 MMHG | RESPIRATION RATE: 18 BRPM

## 2022-05-27 LAB
ANION GAP SERPL CALC-SCNC: 10 MMOL/L (ref 8–16)
BASOPHILS # BLD AUTO: 0.02 K/UL (ref 0–0.2)
BASOPHILS NFR BLD: 0.2 % (ref 0–1.9)
BLD PROD TYP BPU: NORMAL
BLD PROD TYP BPU: NORMAL
BLOOD UNIT EXPIRATION DATE: NORMAL
BLOOD UNIT EXPIRATION DATE: NORMAL
BLOOD UNIT TYPE CODE: 5100
BLOOD UNIT TYPE CODE: 5100
BLOOD UNIT TYPE: NORMAL
BLOOD UNIT TYPE: NORMAL
BUN SERPL-MCNC: 21 MG/DL (ref 8–23)
CALCIUM SERPL-MCNC: 8.9 MG/DL (ref 8.7–10.5)
CHLORIDE SERPL-SCNC: 102 MMOL/L (ref 95–110)
CO2 SERPL-SCNC: 25 MMOL/L (ref 23–29)
CODING SYSTEM: NORMAL
CODING SYSTEM: NORMAL
COMMENT: NORMAL
CREAT SERPL-MCNC: 0.9 MG/DL (ref 0.5–1.4)
DIFFERENTIAL METHOD: ABNORMAL
DISPENSE STATUS: NORMAL
DISPENSE STATUS: NORMAL
EOSINOPHIL # BLD AUTO: 0.1 K/UL (ref 0–0.5)
EOSINOPHIL NFR BLD: 1.3 % (ref 0–8)
ERYTHROCYTE [DISTWIDTH] IN BLOOD BY AUTOMATED COUNT: 12.6 % (ref 11.5–14.5)
EST. GFR  (AFRICAN AMERICAN): >60 ML/MIN/1.73 M^2
EST. GFR  (NON AFRICAN AMERICAN): >60 ML/MIN/1.73 M^2
FINAL PATHOLOGIC DIAGNOSIS: NORMAL
FROZEN SECTION DIAGNOSIS: NORMAL
FROZEN SECTION FOOTNOTE: NORMAL
GLUCOSE SERPL-MCNC: 91 MG/DL (ref 70–110)
GROSS: NORMAL
HCT VFR BLD AUTO: 34.2 % (ref 40–54)
HGB BLD-MCNC: 11.6 G/DL (ref 14–18)
IMM GRANULOCYTES # BLD AUTO: 0.06 K/UL (ref 0–0.04)
IMM GRANULOCYTES NFR BLD AUTO: 0.7 % (ref 0–0.5)
LYMPHOCYTES # BLD AUTO: 1.4 K/UL (ref 1–4.8)
LYMPHOCYTES NFR BLD: 15.7 % (ref 18–48)
Lab: NORMAL
MCH RBC QN AUTO: 28.9 PG (ref 27–31)
MCHC RBC AUTO-ENTMCNC: 33.9 G/DL (ref 32–36)
MCV RBC AUTO: 85 FL (ref 82–98)
MICROSCOPIC EXAM: NORMAL
MONOCYTES # BLD AUTO: 0.7 K/UL (ref 0.3–1)
MONOCYTES NFR BLD: 7.9 % (ref 4–15)
NEUTROPHILS # BLD AUTO: 6.4 K/UL (ref 1.8–7.7)
NEUTROPHILS NFR BLD: 74.2 % (ref 38–73)
NRBC BLD-RTO: 0 /100 WBC
NUM UNITS TRANS PACKED RBC: NORMAL
NUM UNITS TRANS PACKED RBC: NORMAL
PLATELET # BLD AUTO: 159 K/UL (ref 150–450)
PMV BLD AUTO: 11.4 FL (ref 9.2–12.9)
POTASSIUM SERPL-SCNC: 4 MMOL/L (ref 3.5–5.1)
RBC # BLD AUTO: 4.02 M/UL (ref 4.6–6.2)
SODIUM SERPL-SCNC: 137 MMOL/L (ref 136–145)
WBC # BLD AUTO: 8.68 K/UL (ref 3.9–12.7)

## 2022-05-27 PROCEDURE — 25000003 PHARM REV CODE 250: Performed by: STUDENT IN AN ORGANIZED HEALTH CARE EDUCATION/TRAINING PROGRAM

## 2022-05-27 PROCEDURE — 63600175 PHARM REV CODE 636 W HCPCS: Performed by: PHYSICIAN ASSISTANT

## 2022-05-27 PROCEDURE — 85025 COMPLETE CBC W/AUTO DIFF WBC: CPT | Performed by: STUDENT IN AN ORGANIZED HEALTH CARE EDUCATION/TRAINING PROGRAM

## 2022-05-27 PROCEDURE — 36415 COLL VENOUS BLD VENIPUNCTURE: CPT | Performed by: STUDENT IN AN ORGANIZED HEALTH CARE EDUCATION/TRAINING PROGRAM

## 2022-05-27 PROCEDURE — 80048 BASIC METABOLIC PNL TOTAL CA: CPT | Performed by: STUDENT IN AN ORGANIZED HEALTH CARE EDUCATION/TRAINING PROGRAM

## 2022-05-27 PROCEDURE — 94799 UNLISTED PULMONARY SVC/PX: CPT

## 2022-05-27 RX ADMIN — DOCUSATE SODIUM 100 MG: 100 CAPSULE ORAL at 08:05

## 2022-05-27 RX ADMIN — POLYETHYLENE GLYCOL 3350 17 G: 17 POWDER, FOR SOLUTION ORAL at 08:05

## 2022-05-27 RX ADMIN — AMLODIPINE BESYLATE 10 MG: 10 TABLET ORAL at 08:05

## 2022-05-27 RX ADMIN — OXYCODONE HYDROCHLORIDE AND ACETAMINOPHEN 1 TABLET: 5; 325 TABLET ORAL at 08:05

## 2022-05-27 RX ADMIN — ATORVASTATIN CALCIUM 80 MG: 20 TABLET, FILM COATED ORAL at 08:05

## 2022-05-27 RX ADMIN — OXYCODONE HYDROCHLORIDE AND ACETAMINOPHEN 1 TABLET: 5; 325 TABLET ORAL at 01:05

## 2022-05-27 RX ADMIN — DONEPEZIL HYDROCHLORIDE 10 MG: 5 TABLET ORAL at 08:05

## 2022-05-27 RX ADMIN — MUPIROCIN: 20 OINTMENT TOPICAL at 08:05

## 2022-05-27 RX ADMIN — HEPARIN SODIUM 5000 UNITS: 5000 INJECTION INTRAVENOUS; SUBCUTANEOUS at 06:05

## 2022-05-27 RX ADMIN — GABAPENTIN 200 MG: 100 CAPSULE ORAL at 08:05

## 2022-05-27 NOTE — NURSING
Patient being discharge. IV and telemetry monitor discontinue. Patient given d/c instructions reviewed with pt. Medicine delivered from bedside pharmacy.

## 2022-05-27 NOTE — PLAN OF CARE
05/27/22 1101   Post-Acute Status   Post-Acute Authorization Home Health   Home Health Status Set-up Complete/Auth obtained  ([facility]- received voicemail stating they are current with patient. HH orders sent to [facility], cancelled other HH request.)     Sonia Simmons LMSW  [facility]  Ext. [phone]

## 2022-05-27 NOTE — PLAN OF CARE
CM informed by MD that patient is stable for discharge at this time. ADT 30 order placed requesting a w/c van for transportation to [address] [place] [address] in [place], [place]. Estimated pick-up time is 10:00AM.    9:00AM     Per [facility] timeline, estimated pick-up time for the w/c van is 12:00PM. Nurse aware.    Tracy Sheriff RN  Ext [phone]

## 2022-05-27 NOTE — NURSING
0105- Resting in bed. Asymptomatic HR drops to 50's. No complaints voiced or signs of acute distress observed.   0142- Resting in bed. C/o pain 8/10 to neck. Administer Oxycodone 5/325 mg PO, well tolerated. Will continue to monitor.  0242- Patient resting in chair with eyes closed. No distress observed.

## 2022-05-28 LAB — BACTERIA SPEC AEROBE CULT: NO GROWTH

## 2022-05-31 LAB — BACTERIA SPEC ANAEROBE CULT: NORMAL

## 2022-05-31 NOTE — PHYSICIAN QUERY
PT Name: Jose Davis  MR #: [id number]    DOCUMENTATION CLARIFICATION     CDS/: Luciana WICK, RN               Contact information: [email or web address]  This form is a permanent document in the medical record.     Query Date: May 31, 2022    By submitting this query, we are merely seeking further clarification of documentation.  Please utilize your independent clinical judgment when addressing the question(s) below.    The medical record contains the following:  Pathology Findings Location in Medical Record     1. Bone, C5, biopsy:   - Fragments of benign viable lamellar bone and cartilaginous tissue   - Negative for neoplasia   2-5. Spine, C5 vertebrae, corpectomy:   - Schwannoma, cellular variant, with intraosseous extension     CNS WHO Grade 1     Surgical Pathology collected 5/24/22       Please clarify:  [ X ]  Pathology findings noted above are confirmed as diagnoses     [  ]  Pathology findings noted above are not confirmed as diagnoses     [  ]  Pathology findings noted above are incidental     [  ]  Other diagnosis (please specify): ___________     [  ]  Clinically Undetermined       Please document in your progress notes daily for the duration of treatment until resolved and include in your discharge summary.    Form No. [id number]

## 2022-06-06 ENCOUNTER — CLINICAL SUPPORT (OUTPATIENT)
Dept: NEUROSURGERY | Facility: CLINIC | Age: 77
End: 2022-06-06
Payer: MEDICARE

## 2022-06-06 NOTE — PROGRESS NOTES
Patient seen via audio visit  for 2 week post op s/p C5 corpectomy, C4-6 anterior fusion with Dr Medina 05/24/2022               Incision on anterior neck assessed, dermabond used for closure no swelling or drainage, edges well approximated.     Patient was instructed as follows:    Discontinue Bacitracin after tonight.   May shower normally but pat dry after shower.   Do not submerge wound in bath tub or go swimming until released by the physician   Keep incision clean, dry and open to air as much as possible.   Patient encouraged to walk as much as possible but advised to walk with family member or friend and rest as necessary.   No lifting >10lbs.   Avoid bending and twisting the area of your surgery more than 45 degrees from neutral position in any direction.   Return to work will be determined on an individual basis.   No driving or operating machinery while taking narcotic pain medication or muscle relaxants and until cleared by a surgeon.   Wear brace at all times except when lay flat in bed.    All questions were answered. Patient will follow up with Dr Medina 07/05/2022 with xray 1st. Patient was encouraged to call clinic with any future concerns prior to follow up appt. If any worsening symptoms, patient should report to ED.       Essence Salinas RN, BSN  Neurosurgery Nurse Navigator

## 2022-06-10 ENCOUNTER — TELEPHONE (OUTPATIENT)
Dept: NEUROSURGERY | Facility: CLINIC | Age: 77
End: 2022-06-10
Payer: MEDICARE

## 2022-06-27 ENCOUNTER — TELEPHONE (OUTPATIENT)
Dept: NEUROSURGERY | Facility: CLINIC | Age: 77
End: 2022-06-27
Payer: MEDICARE

## 2022-06-27 NOTE — TELEPHONE ENCOUNTER
----- Message from Rene Morales MA sent at 6/27/2022 11:35 AM CDT -----    ----- Message -----  From: Medina Thomas  Sent: 6/27/2022  10:59 AM CDT  To: Carol POND Staff    Jeremy home health nurse calling in regards to pt symptoms he is having , hearing loss in right ear , dizziness , blurred vision , headaches , pain is rated at a 7         Confirmed patient's contact info below:  Contact Name:jeremy  Phone Number: [phone]

## 2022-06-27 NOTE — TELEPHONE ENCOUNTER
Spoke to home health nurse and advised that those issues are not related to surgery he should get in with his PCP asap

## 2022-06-28 LAB — FUNGUS SPEC CULT: NORMAL

## 2022-07-05 ENCOUNTER — OFFICE VISIT (OUTPATIENT)
Dept: NEUROSURGERY | Facility: CLINIC | Age: 77
End: 2022-07-05
Payer: MEDICARE

## 2022-07-05 ENCOUNTER — HOSPITAL ENCOUNTER (OUTPATIENT)
Dept: RADIOLOGY | Facility: HOSPITAL | Age: 77
Discharge: HOME | End: 2022-07-05
Attending: NEUROLOGICAL SURGERY
Payer: MEDICARE

## 2022-07-05 DIAGNOSIS — G95.89 CERVICAL SPINAL MASS: Primary | ICD-10-CM

## 2022-07-05 DIAGNOSIS — Z98.1 S/P CERVICAL SPINAL FUSION: ICD-10-CM

## 2022-07-05 PROCEDURE — 72040 X-RAY EXAM NECK SPINE 2-3 VW: CPT | Mod: TC

## 2022-07-05 PROCEDURE — 72040 XR CERVICAL SPINE AP LATERAL: ICD-10-PCS | Mod: 26,,, | Performed by: RADIOLOGY

## 2022-07-05 PROCEDURE — 72040 X-RAY EXAM NECK SPINE 2-3 VW: CPT | Mod: 26,,, | Performed by: RADIOLOGY

## 2022-07-05 PROCEDURE — 99024 PR POST-OP FOLLOW-UP VISIT: ICD-10-PCS | Mod: S$GLB,,, | Performed by: NEUROLOGICAL SURGERY

## 2022-07-05 PROCEDURE — 99024 POSTOP FOLLOW-UP VISIT: CPT | Mod: S$GLB,,, | Performed by: NEUROLOGICAL SURGERY

## 2022-07-12 LAB
ACID FAST MOD KINY STN SPEC: NORMAL
MYCOBACTERIUM SPEC QL CULT: NORMAL

## 2022-07-21 NOTE — PROGRESS NOTES
Patient back to see me in follow-up after having undergone an anterior approach for a C5 corpectomy and resection of an intraosseous and epidural mass.  The pathology came back as Schwannoma, cellular variant, with intraosseous extension     CNS WHO Grade 1     Patient has done well.  Currently no issues or complaints.  Has good strength.  His incision well healed.  X-rays shows hardware is in reasonable position the interbody device and good but inferior aspect of the plate looks like it is backing out a little bit.  Will need to keep a close eye on things will plan to get a follow-up x-ray in about 4-6 weeks make sure the hardware is not going to back out

## 2022-08-09 ENCOUNTER — TELEPHONE (OUTPATIENT)
Dept: NEUROSURGERY | Facility: CLINIC | Age: 77
End: 2022-08-09
Payer: MEDICARE

## 2022-08-09 NOTE — TELEPHONE ENCOUNTER
Spoke to pt and wife on speaker phone. We confirmed rescheduled date and time for appointment with Clary.

## 2022-08-12 ENCOUNTER — HOSPITAL ENCOUNTER (OUTPATIENT)
Dept: RADIOLOGY | Facility: HOSPITAL | Age: 77
Discharge: HOME | End: 2022-08-12
Attending: NEUROLOGICAL SURGERY
Payer: MEDICARE

## 2022-08-12 ENCOUNTER — OFFICE VISIT (OUTPATIENT)
Dept: NEUROSURGERY | Facility: CLINIC | Age: 77
End: 2022-08-12
Payer: MEDICARE

## 2022-08-12 VITALS — HEART RATE: 72 BPM | DIASTOLIC BLOOD PRESSURE: 75 MMHG | SYSTOLIC BLOOD PRESSURE: 119 MMHG

## 2022-08-12 DIAGNOSIS — Z98.1 S/P CERVICAL SPINAL FUSION: Primary | ICD-10-CM

## 2022-08-12 DIAGNOSIS — R63.39 OTHER FEEDING DIFFICULTIES: ICD-10-CM

## 2022-08-12 DIAGNOSIS — G95.89 CERVICAL SPINAL MASS: ICD-10-CM

## 2022-08-12 DIAGNOSIS — R13.10 DYSPHAGIA, UNSPECIFIED TYPE: ICD-10-CM

## 2022-08-12 PROCEDURE — 3078F PR MOST RECENT DIASTOLIC BLOOD PRESSURE < 80 MM HG: ICD-10-PCS | Mod: CPTII,S$GLB,, | Performed by: NURSE PRACTITIONER

## 2022-08-12 PROCEDURE — 72050 XR CERVICAL SPINE AP LAT WITH FLEX EXTEN: ICD-10-PCS | Mod: 26,,, | Performed by: RADIOLOGY

## 2022-08-12 PROCEDURE — 1159F PR MEDICATION LIST DOCUMENTED IN MEDICAL RECORD: ICD-10-PCS | Mod: CPTII,S$GLB,, | Performed by: NURSE PRACTITIONER

## 2022-08-12 PROCEDURE — 99999 PR PBB SHADOW E&M-EST. PATIENT-LVL III: CPT | Mod: PBBFAC,,, | Performed by: NURSE PRACTITIONER

## 2022-08-12 PROCEDURE — 3288F FALL RISK ASSESSMENT DOCD: CPT | Mod: CPTII,S$GLB,, | Performed by: NURSE PRACTITIONER

## 2022-08-12 PROCEDURE — 99024 PR POST-OP FOLLOW-UP VISIT: ICD-10-PCS | Mod: S$GLB,,, | Performed by: NURSE PRACTITIONER

## 2022-08-12 PROCEDURE — 72050 X-RAY EXAM NECK SPINE 4/5VWS: CPT | Mod: TC

## 2022-08-12 PROCEDURE — 1160F RVW MEDS BY RX/DR IN RCRD: CPT | Mod: CPTII,S$GLB,, | Performed by: NURSE PRACTITIONER

## 2022-08-12 PROCEDURE — 1159F MED LIST DOCD IN RCRD: CPT | Mod: CPTII,S$GLB,, | Performed by: NURSE PRACTITIONER

## 2022-08-12 PROCEDURE — 1125F PR PAIN SEVERITY QUANTIFIED, PAIN PRESENT: ICD-10-PCS | Mod: CPTII,S$GLB,, | Performed by: NURSE PRACTITIONER

## 2022-08-12 PROCEDURE — 3074F PR MOST RECENT SYSTOLIC BLOOD PRESSURE < 130 MM HG: ICD-10-PCS | Mod: CPTII,S$GLB,, | Performed by: NURSE PRACTITIONER

## 2022-08-12 PROCEDURE — 99999 PR PBB SHADOW E&M-EST. PATIENT-LVL III: ICD-10-PCS | Mod: PBBFAC,,, | Performed by: NURSE PRACTITIONER

## 2022-08-12 PROCEDURE — 3288F PR FALLS RISK ASSESSMENT DOCUMENTED: ICD-10-PCS | Mod: CPTII,S$GLB,, | Performed by: NURSE PRACTITIONER

## 2022-08-12 PROCEDURE — 1101F PR PT FALLS ASSESS DOC 0-1 FALLS W/OUT INJ PAST YR: ICD-10-PCS | Mod: CPTII,S$GLB,, | Performed by: NURSE PRACTITIONER

## 2022-08-12 PROCEDURE — 3078F DIAST BP <80 MM HG: CPT | Mod: CPTII,S$GLB,, | Performed by: NURSE PRACTITIONER

## 2022-08-12 PROCEDURE — 1160F PR REVIEW ALL MEDS BY PRESCRIBER/CLIN PHARMACIST DOCUMENTED: ICD-10-PCS | Mod: CPTII,S$GLB,, | Performed by: NURSE PRACTITIONER

## 2022-08-12 PROCEDURE — 72050 X-RAY EXAM NECK SPINE 4/5VWS: CPT | Mod: 26,,, | Performed by: RADIOLOGY

## 2022-08-12 PROCEDURE — 99024 POSTOP FOLLOW-UP VISIT: CPT | Mod: S$GLB,,, | Performed by: NURSE PRACTITIONER

## 2022-08-12 PROCEDURE — 3074F SYST BP LT 130 MM HG: CPT | Mod: CPTII,S$GLB,, | Performed by: NURSE PRACTITIONER

## 2022-08-12 PROCEDURE — 1125F AMNT PAIN NOTED PAIN PRSNT: CPT | Mod: CPTII,S$GLB,, | Performed by: NURSE PRACTITIONER

## 2022-08-12 PROCEDURE — 1101F PT FALLS ASSESS-DOCD LE1/YR: CPT | Mod: CPTII,S$GLB,, | Performed by: NURSE PRACTITIONER

## 2022-08-12 RX ORDER — GABAPENTIN 100 MG/1
200 CAPSULE ORAL 3 TIMES DAILY
Start: 2022-08-12 | End: 2022-08-16 | Stop reason: SDUPTHER

## 2022-08-12 NOTE — PROGRESS NOTES
Neurosurgery  Established Patient    SUBJECTIVE:     History of Present Illness: Jose Davis is a 77 y.o. male s/p anterior C5 corpectomy for epidural tumor and ACD C4-6 on 5/24/22. The patient is being seen in clinic today with his wife to continue to follow his symptoms and surgical hardware since surgery. States that he is doing well overall but is concerned about his continued difficulties with swallowing, right ear hearing loss, and dizziness when looking upward. He is followed by ENT and is scheduled to undergo a hearing test as well as a MRI of the brain. Denies fever or chills. He is compliant with the cervical collar and bone stimulator. Denies weakness, b/b dysfunction, saddle anesthesia, or gait instability.     Review of patient's allergies indicates:  No Known Allergies    Current Outpatient Medications   Medication Sig Dispense Refill    amLODIPine (NORVASC) 10 MG tablet Take 10 mg by mouth.      aspirin (ECOTRIN) 81 MG EC tablet Take 81 mg by mouth once daily.      docusate sodium (COLACE) 100 MG capsule Take 1 capsule (100 mg total) by mouth 2 (two) times daily. Use while taking oxycodone (Patient not taking: No sig reported) 30 capsule 0    donepeziL (ARICEPT) 10 MG tablet Take 10 mg by mouth once daily.      gabapentin (NEURONTIN) 100 MG capsule Take 2 capsules (200 mg total) by mouth 3 (three) times daily.      hydrocortisone 2.5 % cream APPLY TO AFFECTED AREA TWICE A DAY (Patient not taking: Reported on 5/18/2022) 28.35 g 3    hydrocortisone-pramoxine (ANALPRAM-HC) 2.5-1 % Crea INSERT BY RECTUM 3 TIMES A DAY AS NEEDED HEMORRHOIDS      methocarbamoL (ROBAXIN) 750 MG Tab Take 1 tablet (750 mg total) by mouth 4 (four) times daily as needed (muscle spasms). 60 tablet 1    oxyCODONE-acetaminophen (PERCOCET) 5-325 mg per tablet Take 1 tablet by mouth every 4 (four) hours as needed for Pain. 60 tablet 0    rosuvastatin (CRESTOR) 20 MG tablet Take 20 mg by mouth once daily.      traZODone  (DESYREL) 50 MG tablet Take 50 mg by mouth every evening.       No current facility-administered medications for this visit.       Past Medical History:   Diagnosis Date    Enlarged prostate     Hypertension      Past Surgical History:   Procedure Laterality Date    cyst excision from neck      cystoscope      HERNIA REPAIR      nephrectomy  2006    left for a tumor?    PROSTATE SURGERY      ROTATOR CUFF REPAIR      right    SURGICAL REMOVAL OF VERTEBRAL BODY OF CERVICAL SPINE N/A 5/24/2022    Procedure: CORPECTOMY, SPINE, CERVICAL -C5 corpectomy with C4-6 fusion;  Surgeon: Yaya Medina MD;  Location: [facility] OR [address] [place] [address];  Service: Neurosurgery;  Laterality: N/A;  neuromonitoring, krista  headset with traction, [place] J brace, c-arm, spinewave    TONSILLECTOMY       Family History     Problem Relation (Age of Onset)    COPD Father    Cancer Mother    Heart disease Father        Social History     Socioeconomic History    Marital status:    Tobacco Use    Smoking status: Never Smoker    Smokeless tobacco: Never Used   Substance and Sexual Activity    Alcohol use: No     Alcohol/week: 0.0 standard drinks    Drug use: No       Review of Systems   Constitutional: Negative for activity change, appetite change and fever.   HENT: Positive for hearing loss and trouble swallowing. Negative for postnasal drip.    Eyes: Negative for pain and visual disturbance.   Respiratory: Negative for shortness of breath.    Cardiovascular: Negative for chest pain and palpitations.   Gastrointestinal: Negative for abdominal pain.   Endocrine: Negative for polydipsia, polyphagia and polyuria.   Musculoskeletal: Positive for myalgias. Negative for back pain and gait problem.   Neurological: Negative for dizziness, weakness, numbness and headaches.   Psychiatric/Behavioral: Negative for confusion and dysphoric mood.       OBJECTIVE:     Vital Signs  Pulse: 72  BP: 119/75  Pain Score:   5  There is no height or weight on file  to calculate BMI.    Neurosurgery Physical Exam  General: well developed, well nourished, no distress.   Head: normocephalic, atraumatic  Neurologic: Alert and oriented. Thought content appropriate.  GCS: Motor: 6/Verbal: 5/Eyes: 4 GCS Total: 15  Mental Status: Awake, Alert, Oriented x 4  Language: No aphasia  Speech: No dysarthria  Cranial nerves: face symmetric, tongue midline, CN II-XII grossly intact.   Eyes: pupils equal, round, reactive to light with accomodation, EOMI.   Pulmonary: normal respirations, no signs of respiratory distress  Abdomen: soft, non-distended, not tender to palpation  Skin: Skin is warm, dry and intact. Incision well-healed  Sensory: intact to light touch throughout  Motor Strength:Moves all extremities spontaneously with good tone.  Full strength upper and lower extremities. No abnormal movements seen.     Diagnostic Results:  I have personally reviewed the x-ray cervical spine dated 8/12/22 with Dr. Medina that shows postoperative changes of C5 corpectomy and anterior fusion from C4 through C6. The caudal aspect of the plate is not completely flush with the anterior C6 cortex    ASSESSMENT/PLAN:   Jose Davis is a 77 y.o. male s/p anterior C5 corpectomy for epidural tumor and ACD C4-6 on 5/24/22. The patient was seen in clinic today with his wife to continue to follow his symptoms and surgical hardware since surgery. States that he is doing well overall but is concerned about his continued difficulties with swallowing, right ear hearing loss, and dizziness when looking upward. He is followed by ENT and is scheduled to undergo a hearing test as well as a MRI of the brain. After reviewing the imaging with Dr. Medina, he recommended a STAT CT cervical spine to evaluate the hardware in more detail and for surgical planning. A revision of the ACD and PCF C3-6 will need to be performed. A swallow evaluation has also been ordered. He was encouraged to remain in the collar until his appointment  with Dr. Medina to discuss further. He verbalized understanding.     I would like the patient to follow-up in clinic with Dr. Medina. I have encouraged him to contact the clinic with any questions, concerns, or adverse clinical changes. He verbalized understanding.     YOLY Reyes  Neurosurgery  [facility].     Note dictated with voice recognition software, please excuse any grammatical errors.

## 2022-08-15 ENCOUNTER — TELEPHONE (OUTPATIENT)
Dept: SPEECH THERAPY | Facility: HOSPITAL | Age: 77
End: 2022-08-15
Payer: MEDICARE

## 2022-08-15 ENCOUNTER — DOCUMENTATION ONLY (OUTPATIENT)
Dept: NEUROSURGERY | Facility: CLINIC | Age: 77
End: 2022-08-15
Payer: MEDICARE

## 2022-08-15 DIAGNOSIS — Z98.1 S/P CERVICAL SPINAL FUSION: ICD-10-CM

## 2022-08-15 DIAGNOSIS — T84.216A HARDWARE FAILURE OF ANTERIOR COLUMN OF SPINE: Primary | ICD-10-CM

## 2022-08-15 DIAGNOSIS — G95.89 CERVICAL SPINAL MASS: ICD-10-CM

## 2022-08-15 NOTE — TELEPHONE ENCOUNTER
srini pt to schedule mbss 8/25@2:30 pm. Informed to fast 2hrs before test, [facility] radiology clinic. States understanding

## 2022-08-16 RX ORDER — GABAPENTIN 100 MG/1
200 CAPSULE ORAL 3 TIMES DAILY
Qty: 180 CAPSULE | Refills: 0 | Status: SHIPPED | OUTPATIENT
Start: 2022-08-16 | End: 2022-09-15

## 2022-08-18 ENCOUNTER — HOSPITAL ENCOUNTER (OUTPATIENT)
Dept: RADIOLOGY | Facility: HOSPITAL | Age: 77
Discharge: HOME | End: 2022-08-18
Attending: NURSE PRACTITIONER
Payer: MEDICARE

## 2022-08-18 DIAGNOSIS — Z98.1 S/P CERVICAL SPINAL FUSION: ICD-10-CM

## 2022-08-18 PROCEDURE — 72125 CT NECK SPINE W/O DYE: CPT | Mod: 26,,, | Performed by: RADIOLOGY

## 2022-08-18 PROCEDURE — 72125 CT CERVICAL SPINE WITHOUT CONTRAST: ICD-10-PCS | Mod: 26,,, | Performed by: RADIOLOGY

## 2022-08-18 PROCEDURE — 72125 CT NECK SPINE W/O DYE: CPT | Mod: TC

## 2022-08-24 ENCOUNTER — ANESTHESIA EVENT (OUTPATIENT)
Dept: SURGERY | Facility: HOSPITAL | Age: 77
DRG: 473 | End: 2022-08-24
Payer: MEDICARE

## 2022-08-24 ENCOUNTER — TELEPHONE (OUTPATIENT)
Dept: NEUROSURGERY | Facility: CLINIC | Age: 77
End: 2022-08-24
Payer: MEDICARE

## 2022-08-24 NOTE — TELEPHONE ENCOUNTER
Called patient with arrival time for surgery also answered all of patient questions and concerns patient verbalized understanding.

## 2022-08-25 ENCOUNTER — HOSPITAL ENCOUNTER (INPATIENT)
Facility: HOSPITAL | Age: 77
LOS: 5 days | Discharge: NURSING, REHAB OR LONG-TERM CARE | DRG: 473 | End: 2022-08-30
Attending: STUDENT IN AN ORGANIZED HEALTH CARE EDUCATION/TRAINING PROGRAM | Admitting: STUDENT IN AN ORGANIZED HEALTH CARE EDUCATION/TRAINING PROGRAM
Payer: MEDICARE

## 2022-08-25 ENCOUNTER — ANESTHESIA (OUTPATIENT)
Dept: SURGERY | Facility: HOSPITAL | Age: 77
DRG: 473 | End: 2022-08-25
Payer: MEDICARE

## 2022-08-25 DIAGNOSIS — M48.02 CERVICAL STENOSIS OF SPINE: ICD-10-CM

## 2022-08-25 LAB
ABO + RH BLD: NORMAL
ANION GAP SERPL CALC-SCNC: 9 MMOL/L (ref 8–16)
APTT BLDCRRT: 94.7 SEC (ref 21–32)
BASOPHILS # BLD AUTO: 0.01 K/UL (ref 0–0.2)
BASOPHILS NFR BLD: 0.2 % (ref 0–1.9)
BLD GP AB SCN CELLS X3 SERPL QL: NORMAL
BUN SERPL-MCNC: 13 MG/DL (ref 8–23)
CALCIUM SERPL-MCNC: 9.7 MG/DL (ref 8.7–10.5)
CHLORIDE SERPL-SCNC: 107 MMOL/L (ref 95–110)
CO2 SERPL-SCNC: 25 MMOL/L (ref 23–29)
CREAT SERPL-MCNC: 1 MG/DL (ref 0.5–1.4)
DIFFERENTIAL METHOD: ABNORMAL
EOSINOPHIL # BLD AUTO: 0.2 K/UL (ref 0–0.5)
EOSINOPHIL NFR BLD: 3.3 % (ref 0–8)
ERYTHROCYTE [DISTWIDTH] IN BLOOD BY AUTOMATED COUNT: 13 % (ref 11.5–14.5)
EST. GFR  (NO RACE VARIABLE): >60 ML/MIN/1.73 M^2
GLUCOSE SERPL-MCNC: 92 MG/DL (ref 70–110)
GRAM STN SPEC: NORMAL
GRAM STN SPEC: NORMAL
HCT VFR BLD AUTO: 37.8 % (ref 40–54)
HGB BLD-MCNC: 12.9 G/DL (ref 14–18)
IMM GRANULOCYTES # BLD AUTO: 0.02 K/UL (ref 0–0.04)
IMM GRANULOCYTES NFR BLD AUTO: 0.3 % (ref 0–0.5)
INR PPP: 1.2 (ref 0.8–1.2)
LYMPHOCYTES # BLD AUTO: 1.1 K/UL (ref 1–4.8)
LYMPHOCYTES NFR BLD: 17.3 % (ref 18–48)
MCH RBC QN AUTO: 29.9 PG (ref 27–31)
MCHC RBC AUTO-ENTMCNC: 34.1 G/DL (ref 32–36)
MCV RBC AUTO: 88 FL (ref 82–98)
MONOCYTES # BLD AUTO: 0.4 K/UL (ref 0.3–1)
MONOCYTES NFR BLD: 6.9 % (ref 4–15)
NEUTROPHILS # BLD AUTO: 4.4 K/UL (ref 1.8–7.7)
NEUTROPHILS NFR BLD: 72 % (ref 38–73)
NRBC BLD-RTO: 0 /100 WBC
PLATELET # BLD AUTO: 224 K/UL (ref 150–450)
PMV BLD AUTO: 10.2 FL (ref 9.2–12.9)
POTASSIUM SERPL-SCNC: 3.7 MMOL/L (ref 3.5–5.1)
PROTHROMBIN TIME: 12.3 SEC (ref 9–12.5)
RBC # BLD AUTO: 4.32 M/UL (ref 4.6–6.2)
SODIUM SERPL-SCNC: 141 MMOL/L (ref 136–145)
WBC # BLD AUTO: 6.07 K/UL (ref 3.9–12.7)

## 2022-08-25 PROCEDURE — 22845 PR ANTERIOR INSTRUMENTATION 2-3 VERTEBRAL SEGMENTS: ICD-10-PCS | Mod: ,,, | Performed by: STUDENT IN AN ORGANIZED HEALTH CARE EDUCATION/TRAINING PROGRAM

## 2022-08-25 PROCEDURE — 27201423 OPTIME MED/SURG SUP & DEVICES STERILE SUPPLY: Performed by: STUDENT IN AN ORGANIZED HEALTH CARE EDUCATION/TRAINING PROGRAM

## 2022-08-25 PROCEDURE — D9220A PRA ANESTHESIA: ICD-10-PCS | Mod: ANES,,, | Performed by: ANESTHESIOLOGY

## 2022-08-25 PROCEDURE — 63600175 PHARM REV CODE 636 W HCPCS: Performed by: NURSE ANESTHETIST, CERTIFIED REGISTERED

## 2022-08-25 PROCEDURE — 88342 CHG IMMUNOCYTOCHEMISTRY: ICD-10-PCS | Mod: 26,,, | Performed by: STUDENT IN AN ORGANIZED HEALTH CARE EDUCATION/TRAINING PROGRAM

## 2022-08-25 PROCEDURE — 22614 PR ARTHRODESIS, POST/POSTLAT, SNGL INTERSPACE, EA ADDTL: ICD-10-PCS | Mod: ,,, | Performed by: STUDENT IN AN ORGANIZED HEALTH CARE EDUCATION/TRAINING PROGRAM

## 2022-08-25 PROCEDURE — 86850 RBC ANTIBODY SCREEN: CPT | Performed by: STUDENT IN AN ORGANIZED HEALTH CARE EDUCATION/TRAINING PROGRAM

## 2022-08-25 PROCEDURE — 52000 CYSTOURETHROSCOPY: CPT | Mod: ,,, | Performed by: UROLOGY

## 2022-08-25 PROCEDURE — 63275 BX/EXC XDRL SPINE LESN CRVL: CPT | Mod: ,,, | Performed by: STUDENT IN AN ORGANIZED HEALTH CARE EDUCATION/TRAINING PROGRAM

## 2022-08-25 PROCEDURE — 36000710: Performed by: STUDENT IN AN ORGANIZED HEALTH CARE EDUCATION/TRAINING PROGRAM

## 2022-08-25 PROCEDURE — 88342 IMHCHEM/IMCYTCHM 1ST ANTB: CPT | Mod: 26,,, | Performed by: STUDENT IN AN ORGANIZED HEALTH CARE EDUCATION/TRAINING PROGRAM

## 2022-08-25 PROCEDURE — 37000009 HC ANESTHESIA EA ADD 15 MINS: Performed by: STUDENT IN AN ORGANIZED HEALTH CARE EDUCATION/TRAINING PROGRAM

## 2022-08-25 PROCEDURE — 63075 NECK SPINE DISK SURGERY: CPT | Mod: 62,51,, | Performed by: STUDENT IN AN ORGANIZED HEALTH CARE EDUCATION/TRAINING PROGRAM

## 2022-08-25 PROCEDURE — C1713 ANCHOR/SCREW BN/BN,TIS/BN: HCPCS | Performed by: STUDENT IN AN ORGANIZED HEALTH CARE EDUCATION/TRAINING PROGRAM

## 2022-08-25 PROCEDURE — 87076 CULTURE ANAEROBE IDENT EACH: CPT | Performed by: STUDENT IN AN ORGANIZED HEALTH CARE EDUCATION/TRAINING PROGRAM

## 2022-08-25 PROCEDURE — 87116 MYCOBACTERIA CULTURE: CPT | Performed by: STUDENT IN AN ORGANIZED HEALTH CARE EDUCATION/TRAINING PROGRAM

## 2022-08-25 PROCEDURE — 22845 INSERT SPINE FIXATION DEVICE: CPT | Mod: ,,, | Performed by: STUDENT IN AN ORGANIZED HEALTH CARE EDUCATION/TRAINING PROGRAM

## 2022-08-25 PROCEDURE — D9220A PRA ANESTHESIA: Mod: ANES,,, | Performed by: ANESTHESIOLOGY

## 2022-08-25 PROCEDURE — 22600 ARTHRD PST TQ 1NTRSPC CRV: CPT | Mod: 51,,, | Performed by: STUDENT IN AN ORGANIZED HEALTH CARE EDUCATION/TRAINING PROGRAM

## 2022-08-25 PROCEDURE — 88342 IMHCHEM/IMCYTCHM 1ST ANTB: CPT | Performed by: STUDENT IN AN ORGANIZED HEALTH CARE EDUCATION/TRAINING PROGRAM

## 2022-08-25 PROCEDURE — 27800903 OPTIME MED/SURG SUP & DEVICES OTHER IMPLANTS: Performed by: STUDENT IN AN ORGANIZED HEALTH CARE EDUCATION/TRAINING PROGRAM

## 2022-08-25 PROCEDURE — 88341 IMHCHEM/IMCYTCHM EA ADD ANTB: CPT | Mod: 26,,, | Performed by: STUDENT IN AN ORGANIZED HEALTH CARE EDUCATION/TRAINING PROGRAM

## 2022-08-25 PROCEDURE — 63600175 PHARM REV CODE 636 W HCPCS: Performed by: STUDENT IN AN ORGANIZED HEALTH CARE EDUCATION/TRAINING PROGRAM

## 2022-08-25 PROCEDURE — 37000008 HC ANESTHESIA 1ST 15 MINUTES: Performed by: STUDENT IN AN ORGANIZED HEALTH CARE EDUCATION/TRAINING PROGRAM

## 2022-08-25 PROCEDURE — C1729 CATH, DRAINAGE: HCPCS | Performed by: STUDENT IN AN ORGANIZED HEALTH CARE EDUCATION/TRAINING PROGRAM

## 2022-08-25 PROCEDURE — 52000 PR CYSTOURETHROSCOPY: ICD-10-PCS | Mod: ,,, | Performed by: UROLOGY

## 2022-08-25 PROCEDURE — 27201037 HC PRESSURE MONITORING SET UP

## 2022-08-25 PROCEDURE — D9220A PRA ANESTHESIA: Mod: CRNA,,, | Performed by: NURSE ANESTHETIST, CERTIFIED REGISTERED

## 2022-08-25 PROCEDURE — 22600 PR ARTHRODESIS, POST/POSTLAT, SNGL INTERSPACE, CERVICAL BELOW C2: ICD-10-PCS | Mod: 51,,, | Performed by: STUDENT IN AN ORGANIZED HEALTH CARE EDUCATION/TRAINING PROGRAM

## 2022-08-25 PROCEDURE — 88307 PR  SURG PATH,LEVEL V: ICD-10-PCS | Mod: 26,,, | Performed by: STUDENT IN AN ORGANIZED HEALTH CARE EDUCATION/TRAINING PROGRAM

## 2022-08-25 PROCEDURE — 63275 PR BX/EXCIS SPINAL TUMOR,XDURAL,CERV: ICD-10-PCS | Mod: ,,, | Performed by: STUDENT IN AN ORGANIZED HEALTH CARE EDUCATION/TRAINING PROGRAM

## 2022-08-25 PROCEDURE — 25000003 PHARM REV CODE 250: Performed by: NURSE ANESTHETIST, CERTIFIED REGISTERED

## 2022-08-25 PROCEDURE — 88341 IMHCHEM/IMCYTCHM EA ADD ANTB: CPT | Performed by: STUDENT IN AN ORGANIZED HEALTH CARE EDUCATION/TRAINING PROGRAM

## 2022-08-25 PROCEDURE — 85610 PROTHROMBIN TIME: CPT | Performed by: STUDENT IN AN ORGANIZED HEALTH CARE EDUCATION/TRAINING PROGRAM

## 2022-08-25 PROCEDURE — 25000003 PHARM REV CODE 250: Performed by: STUDENT IN AN ORGANIZED HEALTH CARE EDUCATION/TRAINING PROGRAM

## 2022-08-25 PROCEDURE — 87075 CULTR BACTERIA EXCEPT BLOOD: CPT | Performed by: STUDENT IN AN ORGANIZED HEALTH CARE EDUCATION/TRAINING PROGRAM

## 2022-08-25 PROCEDURE — 63075 PR DISK SURG,ANTER,CERVICAL,SINGLE LVL: ICD-10-PCS | Mod: 62,,, | Performed by: OTOLARYNGOLOGY

## 2022-08-25 PROCEDURE — 87206 SMEAR FLUORESCENT/ACID STAI: CPT | Performed by: STUDENT IN AN ORGANIZED HEALTH CARE EDUCATION/TRAINING PROGRAM

## 2022-08-25 PROCEDURE — 63075 PR DISK SURG,ANTER,CERVICAL,SINGLE LVL: ICD-10-PCS | Mod: 62,51,, | Performed by: STUDENT IN AN ORGANIZED HEALTH CARE EDUCATION/TRAINING PROGRAM

## 2022-08-25 PROCEDURE — 36000711: Performed by: STUDENT IN AN ORGANIZED HEALTH CARE EDUCATION/TRAINING PROGRAM

## 2022-08-25 PROCEDURE — 22842 INSERT SPINE FIXATION DEVICE: CPT | Mod: ,,, | Performed by: STUDENT IN AN ORGANIZED HEALTH CARE EDUCATION/TRAINING PROGRAM

## 2022-08-25 PROCEDURE — 20930 PR ALLOGRAFT FOR SPINE SURGERY ONLY MORSELIZED: ICD-10-PCS | Mod: ,,, | Performed by: STUDENT IN AN ORGANIZED HEALTH CARE EDUCATION/TRAINING PROGRAM

## 2022-08-25 PROCEDURE — 71000033 HC RECOVERY, INTIAL HOUR: Performed by: STUDENT IN AN ORGANIZED HEALTH CARE EDUCATION/TRAINING PROGRAM

## 2022-08-25 PROCEDURE — D9220A PRA ANESTHESIA: ICD-10-PCS | Mod: CRNA,,, | Performed by: NURSE ANESTHETIST, CERTIFIED REGISTERED

## 2022-08-25 PROCEDURE — 22842 PR POSTERIOR SEGMENTAL INSTRUMENTATION 3-6 VRT SEG: ICD-10-PCS | Mod: ,,, | Performed by: STUDENT IN AN ORGANIZED HEALTH CARE EDUCATION/TRAINING PROGRAM

## 2022-08-25 PROCEDURE — 88307 TISSUE EXAM BY PATHOLOGIST: CPT | Performed by: STUDENT IN AN ORGANIZED HEALTH CARE EDUCATION/TRAINING PROGRAM

## 2022-08-25 PROCEDURE — 88341 PR IHC OR ICC EACH ADD'L SINGLE ANTIBODY  STAINPR: ICD-10-PCS | Mod: 26,,, | Performed by: STUDENT IN AN ORGANIZED HEALTH CARE EDUCATION/TRAINING PROGRAM

## 2022-08-25 PROCEDURE — 20930 SP BONE ALGRFT MORSEL ADD-ON: CPT | Mod: ,,, | Performed by: STUDENT IN AN ORGANIZED HEALTH CARE EDUCATION/TRAINING PROGRAM

## 2022-08-25 PROCEDURE — 63075 NECK SPINE DISK SURGERY: CPT | Mod: 62,,, | Performed by: OTOLARYNGOLOGY

## 2022-08-25 PROCEDURE — 85730 THROMBOPLASTIN TIME PARTIAL: CPT | Performed by: STUDENT IN AN ORGANIZED HEALTH CARE EDUCATION/TRAINING PROGRAM

## 2022-08-25 PROCEDURE — 80048 BASIC METABOLIC PNL TOTAL CA: CPT | Performed by: STUDENT IN AN ORGANIZED HEALTH CARE EDUCATION/TRAINING PROGRAM

## 2022-08-25 PROCEDURE — 88307 TISSUE EXAM BY PATHOLOGIST: CPT | Mod: 26,,, | Performed by: STUDENT IN AN ORGANIZED HEALTH CARE EDUCATION/TRAINING PROGRAM

## 2022-08-25 PROCEDURE — 11000001 HC ACUTE MED/SURG PRIVATE ROOM

## 2022-08-25 PROCEDURE — 22614 ARTHRD PST TQ 1NTRSPC EA ADD: CPT | Mod: ,,, | Performed by: STUDENT IN AN ORGANIZED HEALTH CARE EDUCATION/TRAINING PROGRAM

## 2022-08-25 PROCEDURE — 71000015 HC POSTOP RECOV 1ST HR: Performed by: STUDENT IN AN ORGANIZED HEALTH CARE EDUCATION/TRAINING PROGRAM

## 2022-08-25 PROCEDURE — 85025 COMPLETE CBC W/AUTO DIFF WBC: CPT | Performed by: STUDENT IN AN ORGANIZED HEALTH CARE EDUCATION/TRAINING PROGRAM

## 2022-08-25 PROCEDURE — 87102 FUNGUS ISOLATION CULTURE: CPT | Performed by: STUDENT IN AN ORGANIZED HEALTH CARE EDUCATION/TRAINING PROGRAM

## 2022-08-25 PROCEDURE — 87205 SMEAR GRAM STAIN: CPT | Performed by: STUDENT IN AN ORGANIZED HEALTH CARE EDUCATION/TRAINING PROGRAM

## 2022-08-25 PROCEDURE — 87070 CULTURE OTHR SPECIMN AEROBIC: CPT | Performed by: STUDENT IN AN ORGANIZED HEALTH CARE EDUCATION/TRAINING PROGRAM

## 2022-08-25 PROCEDURE — C1769 GUIDE WIRE: HCPCS | Performed by: STUDENT IN AN ORGANIZED HEALTH CARE EDUCATION/TRAINING PROGRAM

## 2022-08-25 DEVICE — SCREW PROFICIENT LOCKING: Type: IMPLANTABLE DEVICE | Site: SPINE CERVICAL | Status: FUNCTIONAL

## 2022-08-25 DEVICE — GRAFT ALTAPORE MED CYL 2.6ML: Type: IMPLANTABLE DEVICE | Site: SPINE CERVICAL | Status: FUNCTIONAL

## 2022-08-25 DEVICE — IMPLANTABLE DEVICE: Type: IMPLANTABLE DEVICE | Site: SPINE CERVICAL | Status: FUNCTIONAL

## 2022-08-25 DEVICE — SCREW POLYAXIAL CERV 3.8X12MM: Type: IMPLANTABLE DEVICE | Site: SPINE CERVICAL | Status: FUNCTIONAL

## 2022-08-25 RX ORDER — HEPARIN SODIUM 5000 [USP'U]/ML
5000 INJECTION, SOLUTION INTRAVENOUS; SUBCUTANEOUS EVERY 8 HOURS
Status: DISCONTINUED | OUTPATIENT
Start: 2022-08-26 | End: 2022-08-30 | Stop reason: HOSPADM

## 2022-08-25 RX ORDER — SODIUM CHLORIDE 9 MG/ML
INJECTION, SOLUTION INTRAVENOUS CONTINUOUS
Status: DISCONTINUED | OUTPATIENT
Start: 2022-08-25 | End: 2022-08-26

## 2022-08-25 RX ORDER — OXYCODONE AND ACETAMINOPHEN 7.5; 325 MG/1; MG/1
1 TABLET ORAL EVERY 4 HOURS PRN
Status: DISCONTINUED | OUTPATIENT
Start: 2022-08-25 | End: 2022-08-30 | Stop reason: HOSPADM

## 2022-08-25 RX ORDER — DEXAMETHASONE SODIUM PHOSPHATE 4 MG/ML
INJECTION, SOLUTION INTRA-ARTICULAR; INTRALESIONAL; INTRAMUSCULAR; INTRAVENOUS; SOFT TISSUE
Status: DISCONTINUED | OUTPATIENT
Start: 2022-08-25 | End: 2022-08-25

## 2022-08-25 RX ORDER — GABAPENTIN 100 MG/1
200 CAPSULE ORAL 3 TIMES DAILY
Status: DISCONTINUED | OUTPATIENT
Start: 2022-08-25 | End: 2022-08-30 | Stop reason: HOSPADM

## 2022-08-25 RX ORDER — LIDOCAINE HYDROCHLORIDE AND EPINEPHRINE 20; 10 MG/ML; UG/ML
INJECTION, SOLUTION INFILTRATION; PERINEURAL
Status: DISCONTINUED | OUTPATIENT
Start: 2022-08-25 | End: 2022-08-25 | Stop reason: HOSPADM

## 2022-08-25 RX ORDER — TALC
6 POWDER (GRAM) TOPICAL NIGHTLY PRN
Status: DISCONTINUED | OUTPATIENT
Start: 2022-08-25 | End: 2022-08-30 | Stop reason: HOSPADM

## 2022-08-25 RX ORDER — NEOSTIGMINE METHYLSULFATE 0.5 MG/ML
INJECTION, SOLUTION INTRAVENOUS
Status: DISCONTINUED | OUTPATIENT
Start: 2022-08-25 | End: 2022-08-25

## 2022-08-25 RX ORDER — FENTANYL CITRATE 50 UG/ML
INJECTION, SOLUTION INTRAMUSCULAR; INTRAVENOUS
Status: DISCONTINUED | OUTPATIENT
Start: 2022-08-25 | End: 2022-08-25

## 2022-08-25 RX ORDER — DONEPEZIL HYDROCHLORIDE 10 MG/1
10 TABLET, FILM COATED ORAL EVERY MORNING
Status: DISCONTINUED | OUTPATIENT
Start: 2022-08-26 | End: 2022-08-30 | Stop reason: HOSPADM

## 2022-08-25 RX ORDER — KETOROLAC TROMETHAMINE 30 MG/ML
15 INJECTION, SOLUTION INTRAMUSCULAR; INTRAVENOUS EVERY 8 HOURS PRN
Status: CANCELLED | OUTPATIENT
Start: 2022-08-25 | End: 2022-08-27

## 2022-08-25 RX ORDER — SUCCINYLCHOLINE CHLORIDE 20 MG/ML
INJECTION INTRAMUSCULAR; INTRAVENOUS
Status: DISCONTINUED | OUTPATIENT
Start: 2022-08-25 | End: 2022-08-25

## 2022-08-25 RX ORDER — OXYCODONE AND ACETAMINOPHEN 5; 325 MG/1; MG/1
1 TABLET ORAL EVERY 4 HOURS PRN
Status: DISCONTINUED | OUTPATIENT
Start: 2022-08-25 | End: 2022-08-30 | Stop reason: HOSPADM

## 2022-08-25 RX ORDER — HYDROMORPHONE HYDROCHLORIDE 1 MG/ML
0.2 INJECTION, SOLUTION INTRAMUSCULAR; INTRAVENOUS; SUBCUTANEOUS EVERY 5 MIN PRN
Status: DISCONTINUED | OUTPATIENT
Start: 2022-08-25 | End: 2022-08-25 | Stop reason: HOSPADM

## 2022-08-25 RX ORDER — MIDAZOLAM HYDROCHLORIDE 1 MG/ML
INJECTION, SOLUTION INTRAMUSCULAR; INTRAVENOUS
Status: DISCONTINUED | OUTPATIENT
Start: 2022-08-25 | End: 2022-08-25

## 2022-08-25 RX ORDER — KETAMINE HCL IN 0.9 % NACL 50 MG/5 ML
SYRINGE (ML) INTRAVENOUS
Status: DISCONTINUED | OUTPATIENT
Start: 2022-08-25 | End: 2022-08-25

## 2022-08-25 RX ORDER — SENNOSIDES 8.6 MG/1
8.6 TABLET ORAL 2 TIMES DAILY
Status: DISCONTINUED | OUTPATIENT
Start: 2022-08-25 | End: 2022-08-30 | Stop reason: HOSPADM

## 2022-08-25 RX ORDER — FENTANYL CITRATE 50 UG/ML
25 INJECTION, SOLUTION INTRAMUSCULAR; INTRAVENOUS EVERY 5 MIN PRN
Status: CANCELLED | OUTPATIENT
Start: 2022-08-25

## 2022-08-25 RX ORDER — CEFAZOLIN SODIUM/WATER 2 G/20 ML
2 SYRINGE (ML) INTRAVENOUS
Status: COMPLETED | OUTPATIENT
Start: 2022-08-25 | End: 2022-08-25

## 2022-08-25 RX ORDER — HALOPERIDOL 5 MG/ML
0.5 INJECTION INTRAMUSCULAR EVERY 10 MIN PRN
Status: DISCONTINUED | OUTPATIENT
Start: 2022-08-25 | End: 2022-08-25 | Stop reason: HOSPADM

## 2022-08-25 RX ORDER — PROPOFOL 10 MG/ML
VIAL (ML) INTRAVENOUS CONTINUOUS PRN
Status: DISCONTINUED | OUTPATIENT
Start: 2022-08-25 | End: 2022-08-25

## 2022-08-25 RX ORDER — REMIFENTANIL HYDROCHLORIDE 1 MG/ML
INJECTION, POWDER, LYOPHILIZED, FOR SOLUTION INTRAVENOUS CONTINUOUS PRN
Status: DISCONTINUED | OUTPATIENT
Start: 2022-08-25 | End: 2022-08-25

## 2022-08-25 RX ORDER — HYDROMORPHONE HYDROCHLORIDE 1 MG/ML
1 INJECTION, SOLUTION INTRAMUSCULAR; INTRAVENOUS; SUBCUTANEOUS
Status: DISCONTINUED | OUTPATIENT
Start: 2022-08-25 | End: 2022-08-26

## 2022-08-25 RX ORDER — ACETAMINOPHEN 10 MG/ML
INJECTION, SOLUTION INTRAVENOUS
Status: DISCONTINUED | OUTPATIENT
Start: 2022-08-25 | End: 2022-08-25

## 2022-08-25 RX ORDER — PROPOFOL 10 MG/ML
VIAL (ML) INTRAVENOUS
Status: DISCONTINUED | OUTPATIENT
Start: 2022-08-25 | End: 2022-08-25

## 2022-08-25 RX ORDER — ONDANSETRON 2 MG/ML
4 INJECTION INTRAMUSCULAR; INTRAVENOUS DAILY PRN
Status: CANCELLED | OUTPATIENT
Start: 2022-08-25

## 2022-08-25 RX ORDER — MUPIROCIN 20 MG/G
OINTMENT TOPICAL
Status: DISCONTINUED | OUTPATIENT
Start: 2022-08-25 | End: 2022-08-25 | Stop reason: HOSPADM

## 2022-08-25 RX ORDER — LIDOCAINE HYDROCHLORIDE 10 MG/ML
INJECTION, SOLUTION INTRAVENOUS
Status: DISCONTINUED | OUTPATIENT
Start: 2022-08-25 | End: 2022-08-25

## 2022-08-25 RX ORDER — METHOCARBAMOL 500 MG/1
500 TABLET, FILM COATED ORAL 4 TIMES DAILY
Status: DISCONTINUED | OUTPATIENT
Start: 2022-08-25 | End: 2022-08-29

## 2022-08-25 RX ORDER — METHYLPREDNISOLONE ACETATE 40 MG/ML
INJECTION, SUSPENSION INTRA-ARTICULAR; INTRALESIONAL; INTRAMUSCULAR; SOFT TISSUE
Status: DISCONTINUED | OUTPATIENT
Start: 2022-08-25 | End: 2022-08-25 | Stop reason: HOSPADM

## 2022-08-25 RX ORDER — HYDROMORPHONE HYDROCHLORIDE 1 MG/ML
1 INJECTION, SOLUTION INTRAMUSCULAR; INTRAVENOUS; SUBCUTANEOUS ONCE
Status: DISCONTINUED | OUTPATIENT
Start: 2022-08-26 | End: 2022-08-29

## 2022-08-25 RX ORDER — MUPIROCIN 20 MG/G
1 OINTMENT TOPICAL 2 TIMES DAILY
Status: DISCONTINUED | OUTPATIENT
Start: 2022-08-25 | End: 2022-08-25 | Stop reason: HOSPADM

## 2022-08-25 RX ORDER — DEXMEDETOMIDINE HYDROCHLORIDE 100 UG/ML
INJECTION, SOLUTION INTRAVENOUS
Status: DISCONTINUED | OUTPATIENT
Start: 2022-08-25 | End: 2022-08-25

## 2022-08-25 RX ORDER — PROCHLORPERAZINE EDISYLATE 5 MG/ML
10 INJECTION INTRAMUSCULAR; INTRAVENOUS EVERY 6 HOURS PRN
Status: CANCELLED | OUTPATIENT
Start: 2022-08-25

## 2022-08-25 RX ORDER — ROCURONIUM BROMIDE 10 MG/ML
INJECTION, SOLUTION INTRAVENOUS
Status: DISCONTINUED | OUTPATIENT
Start: 2022-08-25 | End: 2022-08-25

## 2022-08-25 RX ORDER — OXYCODONE HYDROCHLORIDE 5 MG/1
5 TABLET ORAL
Status: CANCELLED | OUTPATIENT
Start: 2022-08-25

## 2022-08-25 RX ORDER — AMLODIPINE BESYLATE 10 MG/1
10 TABLET ORAL EVERY MORNING
Status: DISCONTINUED | OUTPATIENT
Start: 2022-08-26 | End: 2022-08-30 | Stop reason: HOSPADM

## 2022-08-25 RX ORDER — TRAZODONE HYDROCHLORIDE 50 MG/1
50 TABLET ORAL NIGHTLY
Status: DISCONTINUED | OUTPATIENT
Start: 2022-08-26 | End: 2022-08-30 | Stop reason: HOSPADM

## 2022-08-25 RX ORDER — HYDROMORPHONE HYDROCHLORIDE 1 MG/ML
0.5 INJECTION, SOLUTION INTRAMUSCULAR; INTRAVENOUS; SUBCUTANEOUS
Status: CANCELLED | OUTPATIENT
Start: 2022-08-25

## 2022-08-25 RX ORDER — ONDANSETRON 2 MG/ML
INJECTION INTRAMUSCULAR; INTRAVENOUS
Status: DISCONTINUED | OUTPATIENT
Start: 2022-08-25 | End: 2022-08-25

## 2022-08-25 RX ORDER — SODIUM CHLORIDE 0.9 % (FLUSH) 0.9 %
3 SYRINGE (ML) INJECTION
Status: DISCONTINUED | OUTPATIENT
Start: 2022-08-25 | End: 2022-08-25 | Stop reason: HOSPADM

## 2022-08-25 RX ORDER — POLYETHYLENE GLYCOL 3350 17 G/17G
17 POWDER, FOR SOLUTION ORAL 2 TIMES DAILY PRN
Status: DISCONTINUED | OUTPATIENT
Start: 2022-08-25 | End: 2022-08-26

## 2022-08-25 RX ORDER — ACETAMINOPHEN 325 MG/1
650 TABLET ORAL EVERY 6 HOURS PRN
Status: CANCELLED | OUTPATIENT
Start: 2022-08-25

## 2022-08-25 RX ORDER — PHENYLEPHRINE HYDROCHLORIDE 10 MG/ML
INJECTION INTRAVENOUS CONTINUOUS PRN
Status: DISCONTINUED | OUTPATIENT
Start: 2022-08-25 | End: 2022-08-25

## 2022-08-25 RX ORDER — CEFAZOLIN SODIUM/D5W 2 G/100 ML
2 PLASTIC BAG, INJECTION (ML) INTRAVENOUS
Status: DISCONTINUED | OUTPATIENT
Start: 2022-08-25 | End: 2022-08-29

## 2022-08-25 RX ORDER — ACETAMINOPHEN 325 MG/1
650 TABLET ORAL EVERY 6 HOURS
Status: DISCONTINUED | OUTPATIENT
Start: 2022-08-26 | End: 2022-08-30 | Stop reason: HOSPADM

## 2022-08-25 RX ADMIN — Medication 2 G: at 06:08

## 2022-08-25 RX ADMIN — SUCCINYLCHOLINE CHLORIDE 100 MG: 20 INJECTION, SOLUTION INTRAMUSCULAR; INTRAVENOUS at 01:08

## 2022-08-25 RX ADMIN — DEXMEDETOMIDINE HYDROCHLORIDE 8 MCG: 100 INJECTION, SOLUTION INTRAVENOUS at 07:08

## 2022-08-25 RX ADMIN — LIDOCAINE HYDROCHLORIDE 50 MG: 10 INJECTION, SOLUTION INTRAVENOUS at 01:08

## 2022-08-25 RX ADMIN — ACETAMINOPHEN 1000 MG: 10 INJECTION INTRAVENOUS at 07:08

## 2022-08-25 RX ADMIN — PROPOFOL 50 MG: 10 INJECTION, EMULSION INTRAVENOUS at 01:08

## 2022-08-25 RX ADMIN — ROCURONIUM BROMIDE 10 MG: 10 INJECTION, SOLUTION INTRAVENOUS at 01:08

## 2022-08-25 RX ADMIN — REMIFENTANIL HYDROCHLORIDE 0.2 MCG/KG/MIN: 1 INJECTION, POWDER, LYOPHILIZED, FOR SOLUTION INTRAVENOUS at 01:08

## 2022-08-25 RX ADMIN — Medication 10 MG: at 03:08

## 2022-08-25 RX ADMIN — MUPIROCIN: 20 OINTMENT TOPICAL at 01:08

## 2022-08-25 RX ADMIN — GLYCOPYRROLATE 0.3 MG: 0.2 INJECTION, SOLUTION INTRAMUSCULAR; INTRAVITREAL at 01:08

## 2022-08-25 RX ADMIN — DEXMEDETOMIDINE HYDROCHLORIDE 4 MCG: 100 INJECTION, SOLUTION INTRAVENOUS at 07:08

## 2022-08-25 RX ADMIN — Medication 5 MG: at 05:08

## 2022-08-25 RX ADMIN — OXYCODONE HYDROCHLORIDE AND ACETAMINOPHEN 1 TABLET: 5; 325 TABLET ORAL at 09:08

## 2022-08-25 RX ADMIN — PHENYLEPHRINE HYDROCHLORIDE 50 MCG/KG/MIN: 10 INJECTION INTRAVENOUS at 01:08

## 2022-08-25 RX ADMIN — Medication 150 MCG/KG/MIN: at 01:08

## 2022-08-25 RX ADMIN — MIDAZOLAM HYDROCHLORIDE 2 MG: 1 INJECTION, SOLUTION INTRAMUSCULAR; INTRAVENOUS at 01:08

## 2022-08-25 RX ADMIN — PHENYLEPHRINE HYDROCHLORIDE 0.2 MCG/KG/MIN: 10 INJECTION INTRAVENOUS at 01:08

## 2022-08-25 RX ADMIN — ONDANSETRON 4 MG: 2 INJECTION INTRAMUSCULAR; INTRAVENOUS at 01:08

## 2022-08-25 RX ADMIN — FENTANYL CITRATE 50 MCG: 50 INJECTION, SOLUTION INTRAMUSCULAR; INTRAVENOUS at 01:08

## 2022-08-25 RX ADMIN — GLYCOPYRROLATE 0.2 MG: 0.2 INJECTION, SOLUTION INTRAMUSCULAR; INTRAVITREAL at 04:08

## 2022-08-25 RX ADMIN — SODIUM CHLORIDE: 0.9 INJECTION, SOLUTION INTRAVENOUS at 08:08

## 2022-08-25 RX ADMIN — GLYCOPYRROLATE 0.4 MG: 0.2 INJECTION, SOLUTION INTRAMUSCULAR; INTRAVITREAL at 07:08

## 2022-08-25 RX ADMIN — SODIUM CHLORIDE: 0.9 INJECTION, SOLUTION INTRAVENOUS at 01:08

## 2022-08-25 RX ADMIN — ROCURONIUM BROMIDE 20 MG: 10 INJECTION, SOLUTION INTRAVENOUS at 05:08

## 2022-08-25 RX ADMIN — Medication 2 G: at 02:08

## 2022-08-25 RX ADMIN — HYDROMORPHONE HYDROCHLORIDE 1 MG: 1 INJECTION, SOLUTION INTRAMUSCULAR; INTRAVENOUS; SUBCUTANEOUS at 11:08

## 2022-08-25 RX ADMIN — METHOCARBAMOL 500 MG: 500 TABLET ORAL at 09:08

## 2022-08-25 RX ADMIN — GABAPENTIN 200 MG: 100 CAPSULE ORAL at 09:08

## 2022-08-25 RX ADMIN — FENTANYL CITRATE 50 MCG: 50 INJECTION, SOLUTION INTRAMUSCULAR; INTRAVENOUS at 05:08

## 2022-08-25 RX ADMIN — Medication 10 MG: at 02:08

## 2022-08-25 RX ADMIN — DEXAMETHASONE SODIUM PHOSPHATE 12 MG: 4 INJECTION, SOLUTION INTRAMUSCULAR; INTRAVENOUS at 01:08

## 2022-08-25 RX ADMIN — PROPOFOL 150 MG: 10 INJECTION, EMULSION INTRAVENOUS at 01:08

## 2022-08-25 RX ADMIN — NEOSTIGMINE METHYLSULFATE 3 MG: 0.5 INJECTION INTRAVENOUS at 07:08

## 2022-08-25 RX ADMIN — Medication 25 MG: at 01:08

## 2022-08-25 RX ADMIN — Medication 150 MCG/KG/MIN: at 04:08

## 2022-08-25 RX ADMIN — ONDANSETRON 4 MG: 2 INJECTION INTRAMUSCULAR; INTRAVENOUS at 07:08

## 2022-08-25 NOTE — PROGRESS NOTES
Note:    Neurosurgery team consulted  team intra-op for a difficult nazario.   Upon chart review patient had had a laser TURP and traditional TURP with Dr. Styles in 2014.    team was unable to place various catheters including 22 Fr Coude, 12 Silicone.   Upon cystoscopy a large prostatic fossa TURP defect was noted posterior and a relatively hemostatic false passage was visualized posterior just distal to bladder neck.     A wire was passed into the bladder and a 16 Fr Councill tip nazario was placed over a wire. 10cc of water were placed in the balloon.   Per surgery team he voiding well and was urinating in pre-op before surgery.  The patient can have a voiding trial tomorrow.     AF  PGY4

## 2022-08-25 NOTE — INTERVAL H&P NOTE
The patient has been examined and the H&P has been reviewed:    I concur with the findings and no changes have occurred since H&P was written.    Surgery risks, benefits and alternative options discussed and understood by patient/family.      Proceed to OR as planned    There are no hospital problems to display for this patient.

## 2022-08-25 NOTE — ANESTHESIA PROCEDURE NOTES
Intubation    Date/Time: 8/25/2022 1:44 PM  Performed by: Marycruz Diaz CRNA  Authorized by: Juan Fierro MD     Intubation:     Induction:  Intravenous    Intubated:  Postinduction    Mask Ventilation:  Easy with oral airway    Attempts:  1    Attempted By:  Student    Method of Intubation:  Video laryngoscopy    Blade:  [place] 4    Laryngeal View Grade: Grade I - full view of cords      Difficult Airway Encountered?: No      Complications:  None    Airway Device:  Oral endotracheal tube    Airway Device Size:  7.5    Style/Cuff Inflation:  Cuffed (inflated to minimal occlusive pressure)    Tube secured:  21    Secured at:  The lips    Placement Verified By:  Capnometry    Complicating Factors:  None    Findings Post-Intubation:  BS equal bilateral and atraumatic/condition of teeth unchanged

## 2022-08-25 NOTE — ANESTHESIA PREPROCEDURE EVALUATION
[facility]  Anesthesia Pre-Operative Evaluation         Patient Name: Jose Davis  YOB: 1945  MRN: [id number]    SUBJECTIVE:     08/25/2022    Procedure(s) (LRB):  DISCECTOMY, SPINE, CERVICAL, ANTERIOR APPROACH, WITH FUSION C4-6 revision (N/A)  FUSION, SPINE, CERVICAL, POSTERIOR APPROACH C3-6 laminectomy and fusion (N/A)    Jose Davis is a 77 y.o. male here for above procedure    Drips:     Patient Active Problem List   Diagnosis    Encounter for preventive health examination    Immunization due    HTN (hypertension), benign    Shoulder pain    Abnormal partial thromboplastin time (PTT)    Lupus anticoagulant disorder    Osteoarthritis of shoulder    Hepatitis C    Erectile dysfunction    Unilateral inguinal hernia with obstruction and without gangrene    Cervical spinal mass       Review of patient's allergies indicates:  No Known Allergies    No current facility-administered medications on file prior to encounter.     Current Outpatient Medications on File Prior to Encounter   Medication Sig Dispense Refill    amLODIPine (NORVASC) 10 MG tablet Take 10 mg by mouth every morning.      aspirin (ECOTRIN) 81 MG EC tablet Take 81 mg by mouth once daily.      donepeziL (ARICEPT) 10 MG tablet Take 10 mg by mouth every morning.      methocarbamoL (ROBAXIN) 750 MG Tab Take 1 tablet (750 mg total) by mouth 4 (four) times daily as needed (muscle spasms). 60 tablet 1    oxyCODONE-acetaminophen (PERCOCET) 5-325 mg per tablet Take 1 tablet by mouth every 4 (four) hours as needed for Pain. 60 tablet 0    rosuvastatin (CRESTOR) 20 MG tablet Take 20 mg by mouth every evening.      traZODone (DESYREL) 50 MG tablet Take 50 mg by mouth every evening.      hydrocortisone 2.5 % cream APPLY TO AFFECTED AREA TWICE A DAY (Patient not taking: Reported on 5/18/2022) 28.35 g 3    hydrocortisone-pramoxine (ANALPRAM-HC) 2.5-1 % Crea INSERT BY RECTUM 3 TIMES A DAY AS NEEDED HEMORRHOIDS          Past Surgical History:   Procedure Laterality Date    cyst excision from neck      cystoscope      HERNIA REPAIR      nephrectomy  2006    left for a tumor?    PROSTATE SURGERY      ROTATOR CUFF REPAIR      right    SURGICAL REMOVAL OF VERTEBRAL BODY OF CERVICAL SPINE N/A 5/24/2022    Procedure: CORPECTOMY, SPINE, CERVICAL -C5 corpectomy with C4-6 fusion;  Surgeon: Yaya Medina MD;  Location: [facility] OR [address] [place] [address];  Service: Neurosurgery;  Laterality: N/A;  neuromonitoring, krista  headset with traction, [place] J brace, c-arm, spinewave    TONSILLECTOMY         Social History     Socioeconomic History    Marital status:    Tobacco Use    Smoking status: Never Smoker    Smokeless tobacco: Never Used   Substance and Sexual Activity    Alcohol use: No     Alcohol/week: 0.0 standard drinks    Drug use: No         OBJECTIVE:     Vital Signs Range (Last 24H):  Temp:  [36.7 °C (98.1 °F)] 36.7 °C (98.1 °F)  Pulse:  [78] 78  Resp:  [17] 17  SpO2:  [98 %] 98 %  BP: (130)/(76) 130/76    Significant Labs:  Lab Results   Component Value Date    WBC 8.68 05/27/2022    HGB 11.6 (L) 05/27/2022    HCT 34.2 (L) 05/27/2022     05/27/2022    CHOL 234 (H) 04/27/2022    TRIG 121 04/27/2022    HDL 60 04/27/2022    ALT 22 05/23/2022    AST 20 05/23/2022     05/27/2022    K 4.0 05/27/2022     05/27/2022    CREATININE 0.9 05/27/2022    BUN 21 05/27/2022    CO2 25 05/27/2022    TSH 1.195 11/07/2012    PSA 3.2 09/19/2013    INR 1.1 05/23/2022    HGBA1C 6.1 11/07/2012       Diagnostic Studies:    EKG:   Results for orders placed or performed during the hospital encounter of 01/21/19   EKG 12-lead    Collection Time: 01/21/19  9:23 AM    Narrative    Test Reason : Z01.818,  Blood Pressure :  /  mmHG  Vent. Rate : 069 BPM     Atrial Rate : 069 BPM     P-R Int : 158 ms          QRS Dur : 098 ms      QT Int : 376 ms       P-R-T Axes : 058 -07 046 degrees     QTc Int : 402 ms    Normal sinus rhythm with sinus  arrhythmia  Normal ECG  When compared with ECG of 05-FEB-2014 11:16,  No significant change was found  Confirmed by Umberto Rodriguez MD ([id number]) on 1/21/2019 8:45:00 PM    Referred By:             Confirmed By:Umberto Rodriguez MD       2D ECHO:  TTE:  No results found for this or any previous visit.      ALFONSO:  No results found for this or any previous visit.        Pre-op Assessment    I have reviewed the Patient Summary Reports.     I have reviewed the Nursing Notes. I have reviewed the NPO Status.   I have reviewed the Medications.     Review of Systems  Anesthesia Hx:  No problems with previous Anesthesia  History of prior surgery of interest to airway management or planning: Previous anesthesia: General   Cardiovascular:  Functional Capacity good / => 4 METS        Physical Exam  General: Well nourished, Cooperative, Alert and Oriented    Airway:  Mallampati: I   Mouth Opening: Normal  TM Distance: Normal  Tongue: Normal  Neck ROM: Normal ROM    Dental:  Intact    Chest/Lungs:  Clear to auscultation    Heart:  Rate: Normal  Rhythm: Regular Rhythm  Sounds: Normal    Abdomen:  Normal, Soft, Nontender        Anesthesia Plan  Type of Anesthesia, risks & benefits discussed:    Anesthesia Type: Gen ETT  Intra-op Monitoring Plan: Standard ASA Monitors and Art Line  Post Op Pain Control Plan: multimodal analgesia and IV/PO Opioids PRN  Induction:  IV  Airway Plan: Direct and Video, Post-Induction with in-line stabilization  Informed Consent: Informed consent signed with the Patient and all parties understand the risks and agree with anesthesia plan.  All questions answered. Patient consented to blood products? Yes  ASA Score: 2  Day of Surgery Review of History & Physical: H&P Update referred to the surgeon/provider.    Ready For Surgery From Anesthesia Perspective.     .

## 2022-08-25 NOTE — PROGRESS NOTES
PreOp complete. Surgery consent, anesthesia consent, update to H&P, site ricardo, and preOp orders needed. Belongings given to wife, Jennifer.

## 2022-08-25 NOTE — PROCEDURES
BEDSIDE PROCEDURE NOTE:  The penis was prepped and draped in the usual sterile fashion.  A flexible cystoscope was introduced into the penis.  This revealed a TURP defect and a prostatic fossa false passage.  A guide wire was passed through the scope, through the urethral lumen and into the patient's bladder. The scope was removed and a 16 Fr Councill Tip nazario was placed in the bladder over the wire.  There was return of clear urine. The nazario was attached to a  bag and secured to the patient's leg.  The patient tolerated the procedure well.    The surgery was turned over to the primary team.

## 2022-08-26 PROBLEM — T84.216A HARDWARE FAILURE OF ANTERIOR COLUMN OF SPINE: Status: ACTIVE | Noted: 2022-08-26

## 2022-08-26 PROCEDURE — 11000001 HC ACUTE MED/SURG PRIVATE ROOM

## 2022-08-26 PROCEDURE — 25000003 PHARM REV CODE 250: Performed by: STUDENT IN AN ORGANIZED HEALTH CARE EDUCATION/TRAINING PROGRAM

## 2022-08-26 PROCEDURE — 97161 PT EVAL LOW COMPLEX 20 MIN: CPT

## 2022-08-26 PROCEDURE — 97530 THERAPEUTIC ACTIVITIES: CPT

## 2022-08-26 PROCEDURE — 94761 N-INVAS EAR/PLS OXIMETRY MLT: CPT

## 2022-08-26 PROCEDURE — 99024 PR POST-OP FOLLOW-UP VISIT: ICD-10-PCS | Mod: ,,,

## 2022-08-26 PROCEDURE — 92610 EVALUATE SWALLOWING FUNCTION: CPT

## 2022-08-26 PROCEDURE — 25000003 PHARM REV CODE 250

## 2022-08-26 PROCEDURE — 97165 OT EVAL LOW COMPLEX 30 MIN: CPT

## 2022-08-26 PROCEDURE — 97116 GAIT TRAINING THERAPY: CPT

## 2022-08-26 PROCEDURE — 97535 SELF CARE MNGMENT TRAINING: CPT

## 2022-08-26 PROCEDURE — 99024 POSTOP FOLLOW-UP VISIT: CPT | Mod: ,,,

## 2022-08-26 PROCEDURE — 97112 NEUROMUSCULAR REEDUCATION: CPT

## 2022-08-26 PROCEDURE — 63600175 PHARM REV CODE 636 W HCPCS: Performed by: STUDENT IN AN ORGANIZED HEALTH CARE EDUCATION/TRAINING PROGRAM

## 2022-08-26 RX ORDER — OLANZAPINE 10 MG/2ML
2.5 INJECTION, POWDER, FOR SOLUTION INTRAMUSCULAR ONCE AS NEEDED
Status: DISCONTINUED | OUTPATIENT
Start: 2022-08-26 | End: 2022-08-30 | Stop reason: HOSPADM

## 2022-08-26 RX ORDER — HYDROMORPHONE HYDROCHLORIDE 1 MG/ML
1 INJECTION, SOLUTION INTRAMUSCULAR; INTRAVENOUS; SUBCUTANEOUS EVERY 6 HOURS PRN
Status: DISCONTINUED | OUTPATIENT
Start: 2022-08-26 | End: 2022-08-29

## 2022-08-26 RX ORDER — POLYETHYLENE GLYCOL 3350 17 G/17G
17 POWDER, FOR SOLUTION ORAL DAILY
Status: DISCONTINUED | OUTPATIENT
Start: 2022-08-26 | End: 2022-08-30 | Stop reason: HOSPADM

## 2022-08-26 RX ADMIN — METHOCARBAMOL 500 MG: 500 TABLET ORAL at 08:08

## 2022-08-26 RX ADMIN — Medication 2 G: at 07:08

## 2022-08-26 RX ADMIN — ACETAMINOPHEN 650 MG: 325 TABLET ORAL at 05:08

## 2022-08-26 RX ADMIN — Medication 2 G: at 03:08

## 2022-08-26 RX ADMIN — OXYCODONE AND ACETAMINOPHEN 1 TABLET: 7.5; 325 TABLET ORAL at 08:08

## 2022-08-26 RX ADMIN — TRAZODONE HYDROCHLORIDE 50 MG: 50 TABLET ORAL at 08:08

## 2022-08-26 RX ADMIN — AMLODIPINE BESYLATE 10 MG: 10 TABLET ORAL at 06:08

## 2022-08-26 RX ADMIN — METHOCARBAMOL 500 MG: 500 TABLET ORAL at 12:08

## 2022-08-26 RX ADMIN — ACETAMINOPHEN 650 MG: 325 TABLET ORAL at 11:08

## 2022-08-26 RX ADMIN — SENNOSIDES 8.6 MG: 8.6 TABLET ORAL at 08:08

## 2022-08-26 RX ADMIN — GABAPENTIN 200 MG: 100 CAPSULE ORAL at 08:08

## 2022-08-26 RX ADMIN — GABAPENTIN 200 MG: 100 CAPSULE ORAL at 03:08

## 2022-08-26 RX ADMIN — OXYCODONE HYDROCHLORIDE AND ACETAMINOPHEN 1 TABLET: 5; 325 TABLET ORAL at 08:08

## 2022-08-26 RX ADMIN — Medication 2 G: at 11:08

## 2022-08-26 RX ADMIN — ACETAMINOPHEN 650 MG: 325 TABLET ORAL at 12:08

## 2022-08-26 RX ADMIN — METHOCARBAMOL 500 MG: 500 TABLET ORAL at 05:08

## 2022-08-26 RX ADMIN — DONEPEZIL HYDROCHLORIDE 10 MG: 10 TABLET ORAL at 06:08

## 2022-08-26 RX ADMIN — POLYETHYLENE GLYCOL 3350 17 G: 17 POWDER, FOR SOLUTION ORAL at 12:08

## 2022-08-26 NOTE — PT/OT/SLP EVAL
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    Recommendations:                 General Recommendations:  Dysphagia therapy  Diet recommendations:  Mechanical soft, Thin   Aspiration Precautions: Eliminate distractions, Feed only when awake/alert, Frequent oral care, HOB to 90 degrees, Meds whole 1 at a time, Monitor for s/s of aspiration, Remain upright 30 minutes post meal, Small bites/sips and Standard aspiration precautions   General Precautions: Standard, aspiration, fall  Communication strategies:  none    History:     Past Medical History:   Diagnosis Date    Enlarged prostate     Hypertension        Past Surgical History:   Procedure Laterality Date    ANTERIOR CERVICAL DISCECTOMY W/ FUSION N/A 8/25/2022    Procedure: DISCECTOMY, SPINE, CERVICAL, ANTERIOR APPROACH, WITH FUSION C4-6 revision;  Surgeon: Abelino Hines DO;  Location: [facility] OR [address] [place] [address];  Service: Neurosurgery;  Laterality: N/A;  regular bed, supine, yuli, neuromonitoring, [place]eredith [place], type and cross 2 units     cyst excision from neck      cystoscope      CYSTOSCOPY N/A 8/25/2022    Procedure: CYSTOSCOPY;  Surgeon: Edgar Linder MD;  Location: [facility] OR [address] [place] [address];  Service: Urology;  Laterality: N/A;    FUSION OF CERVICAL SPINE BY POSTERIOR APPROACH N/A 8/25/2022    Procedure: FUSION, SPINE, CERVICAL, POSTERIOR APPROACH C3-T1 Fusion C4-C6  laminectomy;  Surgeon: Abelino Hines DO;  Location: [facility] OR [address] [place] [address];  Service: Neurosurgery;  Laterality: N/A;  regualr bed, prone, diehl, yuli, neuromonitoring, [place]eredith [place]    HERNIA REPAIR      nephrectomy  2006    left for a tumor?    PROSTATE SURGERY      ROTATOR CUFF REPAIR      right    SURGICAL REMOVAL OF VERTEBRAL BODY OF CERVICAL SPINE N/A 5/24/2022    Procedure: CORPECTOMY, SPINE, CERVICAL -C5 corpectomy with C4-6 fusion;  Surgeon: Yaya Medina MD;   "Location: [facility] OR [address] [place] [address];  Service: Neurosurgery;  Laterality: N/A;  neuromonitoring, krista  headset with traction, [place] J brace, c-arm, spinewave    TONSILLECTOMY      URETHRAL CATHETERIZATION  8/25/2022    Procedure: CATHETERIZATION, URETHRA;  Surgeon: Edgar Linder MD;  Location: [facility] OR [address] [place] [address];  Service: Urology;;     Jose Davis is a 77 y.o. male s/p anterior C5 corpectomy for epidural tumor and ACD C4-6 on 5/24/22 performed by Dr. Medina. The patient was recently seen in clinic in f/u and endorsed dysphagia. Denies fever or chills. He is compliant with the cervical collar and bone stimulator. Denies weakness, b/b dysfunction, saddle anesthesia, or gait instability.     CT c spine showed stable placement of the C5 corpectomy cage however, the anterior cervical plate from C4-6 had backed out in addition to the screws going into the C4 and C6 vertebral bodies. He was thus offered an anterior cervical revision of his plate and screws and a posterior decompression/fusion in order to further stabilize his construct and to further decompress his neural elements from the remaining posterior cervical extradural lesion.    Social History: Patient lives with wife.    Prior Intubation HX:  8/25/2022 for surgery    Modified Barium Swallow: none this admission, had orders placed 8/15/2022 but not completed    Chest X-Rays: none this admission    Prior diet: soft/thins        Subjective     "I can eat the chicken my wife cooks"    Spoke with RN prior to entering pt's room . Pt seen bedside for session. Alert and agreeable to ST.     Pain/Comfort:  · Pain Rating 1: 0/10  · Pain Rating Post-Intervention 1: 0/10    Respiratory Status: Room air    Objective:     Oral Musculature Evaluation  · Oral Musculature: WFL  · Dentition: present and adequate  · Secretion Management: adequate  · Mucosal Quality: adequate  · Oral Labial Strength and Mobility: WFL  · Lingual Strength and Mobility: WFL  · Volitional Cough: " elicited  · Volitional Swallow: elicited  · Voice Prior to PO Intake: clear    Bedside Swallow Eval:   Consistencies Assessed:  · Thin liquids via cup and straw  · Puree applesauce  · Solids cracker     Oral Phase:   · WFL    Pharyngeal Phase:   · no overt clinical signs/symptoms of aspiration  · no overt clinical signs/symptoms of pharyngeal dysphagia    Compensatory Strategies  · None    Treatment: Provided education to pt re: role of ST, POC, swallow precautions, recommendations to continue soft diet with thin liquids, and plan to f/u to ensure tolerance. They verbalized understanding. White board updated. RN and MD notified of results and recs.      Assessment:     Jose Davis is a 77 y.o. male with an SLP diagnosis of Dysphagia.  He presents with good tolerance of soft diet with thin liquids.    Goals:   Multidisciplinary Problems     SLP Goals        Problem: SLP    Goal Priority Disciplines Outcome   SLP Goal     SLP    Description: Goals expected to be met by 9/2:   1. Pt will participate in ongoing assessment of swallow function to determine least restrictive diet.                    Plan:     · Patient to be seen:  3 x/week   · Plan of Care expires:  09/24/22  · Plan of Care reviewed with:  patient   · SLP Follow-Up:  Yes       Discharge recommendations:  rehabilitation facility   Barriers to Discharge:  Level of Skilled Assistance Needed .    Time Tracking:     SLP Treatment Date:   08/26/22  Speech Start Time:  0853  Speech Stop Time:  0913     Speech Total Time (min):  20 min    Billable Minutes: Eval Swallow and Oral Function 12 and Self Care/Home Management Training 8    08/26/2022

## 2022-08-26 NOTE — PROGRESS NOTES
Michael Flores - Neurosurgery ([facility])  Neurosurgery  Progress Note    Subjective:     History of Present Illness: Jose Davis is a 77 y.o. 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 Medina. The patient was recently seen in clinic in f/u and endorsed dysphagia. Denies fever or chills. He is compliant with the cervical collar and bone stimulator. Denies weakness, b/b dysfunction, saddle anesthesia, or gait instability.     CT c spine showed stable placement of the C5 corpectomy cage however, the anterior cervical plate from C4-6 had backed out in addition to the screws going into the C4 and C6 vertebral bodies. He was thus offered an anterior cervical revision of his plate and screws and a posterior decompression/fusion in order to further stabilize his construct and to further decompress his neural elements from the remaining posterior cervical extradural lesion.      Post-Op Info:  Procedure(s) (LRB):  DISCECTOMY, SPINE, CERVICAL, ANTERIOR APPROACH, WITH FUSION C4-6 revision (N/A)  FUSION, SPINE, CERVICAL, POSTERIOR APPROACH C3-T1 Fusion C4-C6  laminectomy (N/A)  CATHETERIZATION, URETHRA  CYSTOSCOPY (N/A)   1 Day Post-Op     Interval History: POD 1. Patient with some agitation and disorientation overnight. Appears to have resolved this am. Patient is in no distress, Aox4. Worked well with therapy this am out of bed. States his pain is well controlled. Has not noticed much improvement with his swallowing but no worsening symptoms. R deltoid weakness but attributes this to pain. Denies numbness. Rosario in place, will remove today and f/u voiding.    Medications:  Continuous Infusions:   sodium chloride 0.9% 75 mL/hr at 08/25/22 2058     Scheduled Meds:   acetaminophen  650 mg Oral Q6H    amLODIPine  10 mg Oral QAM    ceFAZolin (ANCEF) IVPB  2 g Intravenous Q8H    donepeziL  10 mg Oral QAM    gabapentin  200 mg Oral TID    heparin (porcine)  5,000 Units Subcutaneous  "Q8H    HYDROmorphone  1 mg Intravenous Once    methocarbamoL  500 mg Oral QID    senna  8.6 mg Oral BID    traZODone  50 mg Oral QHS     PRN Meds:HYDROmorphone, melatonin, OLANZapine, oxyCODONE-acetaminophen, oxyCODONE-acetaminophen, polyethylene glycol     Review of Systems  Objective:     Weight: 56.2 kg (124 lb)  Body mass index is 20.63 kg/m².  Vital Signs (Most Recent):  Temp: 96.3 °F (35.7 °C) (08/26/22 0734)  Pulse: 72 (08/26/22 0734)  Resp: 16 (08/26/22 0810)  BP: (!) 160/77 (08/26/22 0734)  SpO2: 97 % (08/26/22 0734)   Vital Signs (24h Range):  Temp:  [96.3 °F (35.7 °C)-98.6 °F (37 °C)] 96.3 °F (35.7 °C)  Pulse:  [] 72  Resp:  [15-19] 16  SpO2:  [93 %-100 %] 97 %  BP: (122-166)/(74-99) 160/77     Date 08/26/22 0700 - 08/27/22 0659   Shift 1850-6551 4850-0821 8610-5570 24 Hour Total   INTAKE   Shift Total(mL/kg)       OUTPUT   Drains 110   110   Shift Total(mL/kg) 110(2)   110(2)   Weight (kg) 56.2 56.2 56.2 56.2                        Closed/Suction Drain 08/25/22 1815 Left;Anterior Neck Accordion 10 Fr. (Active)   Site Description Healing 08/25/22 2240   Drainage Serosanguineous 08/26/22 0921   Status To bulb suction 08/26/22 0921   Output (mL) 10 mL 08/26/22 0921            Closed/Suction Drain 08/25/22 1953 Posterior Back Accordion 10 Fr. (Active)   Site Description Unable to view 08/25/22 2130   Drainage Serosanguineous 08/26/22 0921   Status To bulb suction 08/26/22 0921   Output (mL) 100 mL 08/26/22 0921            Urethral Catheter 08/25/22 1500  16 Fr. (Active)   Site Assessment Clean;Intact;Dry 08/26/22 0810   Collection Container Urimeter 08/26/22 0810   Securement Method secured to top of thigh w/ adhesive device 08/26/22 0810   Catheter Care Performed yes 08/26/22 0810   Reason for Continuing Urinary Catheterization Placed by Urology Service 08/26/22 0810   CAUTI Prevention Bundle Securement Device in place with 1" slack;Intact seal between catheter & drainage tubing;Drainage " bag/urimeter off the floor;Sheeting clip in use;No dependent loops or kinks;Drainage bag/urimeter not overfilled (<2/3 full);Drainage bag/urimeter below bladder 08/26/22 0810   Output (mL) 875 mL 08/25/22 2200       Neurosurgery Physical Exam  General: Well developed, well nourished, not in acute distress.   Head: Normocephalic, atraumatic.  Neck: C-collar in place.  Neurologic: Alert and oriented x 4. Thought content appropriate.  GCS: Motor:6  Verbal:5  Eyes:4   GCS Total: 15  Language: No aphasia.  Speech: No dysarthria.  Cranial nerves: Face symmetric, tongue midline, CN II-XII grossly intact.   Eyes: Pupils equal, round, reactive to light with accomodation, EOMI.   Pulmonary: Normal respirations, no signs of respiratory distress.  Abdomen: Soft, non-distended, non-tender to palpation.  Sensory: Intact to light touch throughout.  Motor Strength: Moves all extremities spontaneously with good tone. No abnormal movements seen.     Strength  Deltoids Triceps Biceps Wrist Extension Wrist Flexion Hand    Upper: R 4-/5 5/5 5/5 5/5 5/5 5/5    L 5/5 5/5 5/5 5/5 5/5 5/5     Hip Flexion Knee Extension Knee  Flexion Dorsiflexion Plantar flexion EHL   Lower: R 5/5 5/5 5/5 5/5 5/5 5/5    L 5/5 5/5 5/5 5/5 5/5 5/5     DTRs: 2+ and symmetric in UEs and LEs.  Vascular: No LE edema.   Skin: Skin is warm, dry and intact.    Anterior cervical incision with dermabond. No dehiscence, drainage or underlying collection.    Posterior cervical incision with island border, minimal drainage.     HV drains in place.    Significant Labs:  Recent Labs   Lab 08/25/22  1312   GLU 92      K 3.7      CO2 25   BUN 13   CREATININE 1.0   CALCIUM 9.7     Recent Labs   Lab 08/25/22  1312   WBC 6.07   HGB 12.9*   HCT 37.8*        Recent Labs   Lab 08/25/22  1312   INR 1.2   APTT 94.7*     Microbiology Results (last 7 days)       Procedure Component Value Units Date/Time    Aerobic culture [[id number]] Collected: 08/25/22 1619     Order Status: Completed Specimen: Bone from Neck Updated: 08/26/22 0730     Aerobic Bacterial Culture No growth    Narrative:      1. Hardware - culture    Culture, Anaerobe [[id number]] Collected: 08/25/22 1610    Order Status: Completed Specimen: Bone from Neck Updated: 08/26/22 0650     Anaerobic Culture Culture in progress    Narrative:      1. Hardware - culture    Gram stain [[id number]] Collected: 08/25/22 1610    Order Status: Completed Specimen: Bone from Neck Updated: 08/25/22 1755     Gram Stain Result Rare WBC's      No organisms seen    Narrative:      1. Hardware - culture    Fungus culture [[id number]] Collected: 08/25/22 1610    Order Status: Sent Specimen: Bone from Neck Updated: 08/25/22 1626    AFB Culture & Smear [[id number]] Collected: 08/25/22 1610    Order Status: Sent Specimen: Bone from Neck Updated: 08/25/22 1626          All pertinent labs from the last 24 hours have been reviewed.    Significant Diagnostics:  I have reviewed and interpreted all pertinent imaging results/findings within the past 24 hours.    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    - Admit to neurosurgery floor.   -q4h neuro checks  - All pertinent labs and diagnostics personally reviewed.  - Post op x-ray with hardware in good positioning.  - Posterior HV drain with 100 cc output. Anterior drain with 10 cc output, may remove today. Abx while in place.  - Pain control with multimodal regimen. Weaning IV dilaudid to off.  - DVT ppx: TEDs/SCDs/SQH  - Activity: PT/OT; OOB; c-collar when out of bed.  - Diet: soft diet. F/u SLP recs.  - Bowel regimen: senna, miralax daily.  - Urinary: f/u voiding status after nazario removal today. Per Urology who was consulted for difficult nazario placement intraop, perform voiding trial POD 1.  - Atelectasis ppx: Encourage IS  hourly, up in chair >6 hrs a day.  - Please contact neurosurgery with any exam changes.    Discussed with Dr. Hines    Dispo: ongoing            Leti Pretty PA-C  Neurosurgery  Michael Flores - Neurosurgery ([facility])

## 2022-08-26 NOTE — PLAN OF CARE
Problem: Adult Inpatient Plan of Care  Goal: Plan of Care Review  Outcome: Ongoing, Progressing  Goal: Readiness for Transition of Care  Outcome: Ongoing, Progressing     Problem: Infection  Goal: Absence of Infection Signs and Symptoms  Outcome: Ongoing, Progressing     Problem: Fall Injury Risk  Goal: Absence of Fall and Fall-Related Injury  Outcome: Ongoing, Progressing     Poc reviewed with patient. Patient confused from time to time. Hard for patient to focus at times, and follow direction. Patient complained x1 of pain. Medicated with percocet 10. Safety measures maintained. Patient bed in low position. Bed alarm set. Patient call bell with in reach. Patient belongings with in reach. VSS. Full assessment in chart. NAEO.

## 2022-08-26 NOTE — PT/OT/SLP EVAL
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  He presents with impaired ADL and mobility performance deficits. Pt found upright in bed and agreeable for therapy. Pt was easily distracted this date requiring max redirection to task. Pt limited 2/2 poor safety awareness, lateral sway, narrow IRVIN, and poor RW management in throughout session however eager to participate and correct. PTA, pt explains he lives with his wife in a mobile home. PTA, pt was (I) with ADLs and used furniture assist method of ambulation. Pt with a history of vertigo and 1 recent fall. At this time, pt is a high fall risk and is not safe to return home. Pt would benefit from continued OT skilled services 4x/wk to improve daily living skills to optimize QOL. Pt is recommended to discharge to rehab at this time.    Performance deficits affecting function: weakness, gait instability, impaired endurance, impaired balance, decreased safety awareness, pain, orthopedic precautions, impaired functional mobility, impaired self care skills.      Rehab Prognosis: Good; patient would benefit from acute skilled OT services to address these deficits and reach maximum level of function.       Plan:     Patient to be seen 4 x/week to  address the above listed problems via self-care/home management, therapeutic activities, therapeutic exercises, neuromuscular re-education  · Plan of Care Expires:    · Plan of Care Reviewed with: patient    Subjective     Chief Complaint: generalized neck discomfort   Patient/Family Comments/goals: improve mobility    Occupational Profile:  Living Environment: Pt lives with his spouse in a mobile home with 3 KRISTEL with BL HR. Pt has a walk in shower.  Previous level of function: Pt explains he has a history of vertigo, generally off-balance and furniture surfs. Pt I with ADLs.  Roles and Routines: Pt disabled and not working. Spouse. .  Equipment Used at Home:  cane, straight  Assistance upon Discharge: I with ADLs     Pain/Comfort:  · Pain Rating 1: 0/10  · Pain Rating Post-Intervention 1: 0/10    Patients cultural, spiritual, Tenriism conflicts given the current situation: no    Objective:     Communicated with: RN prior to session.  Patient found HOB elevated with SCD, telemetry, hemovac, bed alarm, nazario catheter upon OT entry to room.    General Precautions: Standard, fall, aspiration   Orthopedic Precautions:spinal precautions   Braces: Aspen collar  Respiratory Status: Room air    Occupational Performance:    Bed Mobility:    · Patient completed Rolling/Turning to Left with  minimum assistance  · Patient completed Scooting/Bridging with minimum assistance  · Patient completed Supine to Sit with minimum assistance    Functional Mobility/Transfers:  · Patient completed Sit <> Stand Transfer with minimum assistance  with  rolling walker   · Functional Mobility: Pt stood from bed with min A using RW and required CGA-min A to mobilize with RW with noted posterior lean. Pt with narrow IRVIN and lateral sway. Pt did require mod A at EOB when seated for dynamic LE assessment    Activities of Daily Living:  · Upper Body Dressing: maximal assistance donning gown around back seated   · Lower Body Dressing: maximal  assistance with pt attempting to don socks in bed using figure 4 however poor insight to spinal prec with therapy A    Cognitive/Visual Perceptual:  Cognitive/Psychosocial Skills:     -       Oriented to: Person, Place and Situation   -       Follows Commands/attention:Inattentive, Easily distracted and Follows one-step commands  -       Communication: clear/fluent  -       Memory: Poor immediate recall  -       Safety awareness/insight to disability: impaired   -       Mood/Affect/Coping skills/emotional control: Pleasant    Physical Exam:  Balance:    -       demo good sitting balance static; fair dynamic balance at EOB (posterior lean)  Dominant hand:    -       right  Upper Extremity Range of Motion:     -       Right Upper Extremity: WFL  -       Left Upper Extremity: WFL  Upper Extremity Strength:    -       Right Upper Extremity: WFL  -       Left Upper Extremity: WFL   Strength:    -       Right Upper Extremity: WNL  -       Left Upper Extremity: WNL    AMPAC 6 Click ADL:  AMPAC Total Score: 15    Treatment & Education:  Pt educated on role of occupational therapy, POC, and safety during ADLs and functional mobility. Pt and OT discussed importance of safe, continued mobility to optimize daily living skills. Pt verbalized understanding. White board updated during session. Pt given instruction to call for medical staff/nurse for assistance.     Education:    Patient left HOB elevated with all lines intact, call button in reach, bed alarm on, RN notified and AVYS camera  present    GOALS:   Multidisciplinary Problems     Occupational Therapy Goals        Problem: Occupational Therapy    Goal Priority Disciplines Outcome Interventions   Occupational Therapy Goal     OT, PT/OT Ongoing, Progressing    Description: Goals to be met by: 9/26/2022 (1 month)     Patient will increase functional independence with ADLs by performing:    UE Dressing with Supervision.  LE Dressing with Supervision.  Grooming while  standing at sink with Supervision.  Toileting from toilet with Supervision for hygiene and clothing management.   Supine to sit with Supervision.  Step transfer with Supervision  Toilet transfer to toilet with Supervision.                     History:     Past Medical History:   Diagnosis Date    Enlarged prostate     Hypertension        Past Surgical History:   Procedure Laterality Date    ANTERIOR CERVICAL DISCECTOMY W/ FUSION N/A 8/25/2022    Procedure: DISCECTOMY, SPINE, CERVICAL, ANTERIOR APPROACH, WITH FUSION C4-6 revision;  Surgeon: Abelino Hines DO;  Location: [facility] [address] [place] [address];  Service: Neurosurgery;  Laterality: N/A;  regular bed, supine, yuli, neuromonitoring, Meredith Stella, type and cross 2 units     cyst excision from neck      cystoscope      CYSTOSCOPY N/A 8/25/2022    Procedure: CYSTOSCOPY;  Surgeon: Edgar Linder MD;  Location: [facility] [address] [place] [address];  Service: Urology;  Laterality: N/A;    FUSION OF CERVICAL SPINE BY POSTERIOR APPROACH N/A 8/25/2022    Procedure: FUSION, SPINE, CERVICAL, POSTERIOR APPROACH C3-T1 Fusion C4-C6  laminectomy;  Surgeon: Abelino Hines DO;  Location: [facility] [address] [place] [address];  Service: Neurosurgery;  Laterality: N/A;  regualr bed, prone, diehl, yuli, neuromonitoring, Meredith lao    HERNIA REPAIR      nephrectomy  2006    left for a tumor?    PROSTATE SURGERY      ROTATOR CUFF REPAIR      right    SURGICAL REMOVAL OF VERTEBRAL BODY OF CERVICAL SPINE N/A 5/24/2022    Procedure: CORPECTOMY, SPINE, CERVICAL -C5 corpectomy with C4-6 fusion;  Surgeon: Yaya Medina MD;  Location: [facility] [address] [place] [address];  Service: Neurosurgery;  Laterality: N/A;  neuromonitoring, krista  headset with traction, [place] J brace, c-arm, spinewave    TONSILLECTOMY      URETHRAL CATHETERIZATION  8/25/2022    Procedure: CATHETERIZATION, URETHRA;  Surgeon: Edgar Linder MD;  Location: [facility] [address] [place] [address];  Service: Urology;;       Time Tracking:     OT Date of Treatment: 08/26/22  OT Start Time:  0938  OT Stop Time: 1011  OT Total Time (min): 33 min    Billable Minutes:Evaluation 10 min  Self Care/Home Management 10 min  Therapeutic Activity 13 min    8/26/2022

## 2022-08-26 NOTE — PROGRESS NOTES
Patient meets requirements for next phase of care. No acute complaints of nausea, pain, or dyspnea. Surgical site clean, dry, and intact. Family notified.

## 2022-08-26 NOTE — HOSPITAL COURSE
8/26: POD 1. Patient with some agitation and disorientation overnight. Appears to have resolved this am. Patient is in no distress, Aox4. Worked well with therapy this am out of bed. States his pain is well controlled. Has not noticed much improvement with his swallowing but no worsening symptoms. R deltoid weakness but attributes this to pain. Denies numbness. Rosario in place, will remove today and f/u voiding.  8/27: patient doing well, no focal strength deficits, just some pain limitation. Incision looks good. Anterior neck drain with low output, will remove today. PT recommending IPR  8/28: neuro stable. Pending IPR. Posterior drain in place with 80cc SS drainage, will leave today. Anterior neck drain removed.   8/29: KEON. Neuro stable. Posterior drain removed, no issues. Reports significant posterior cervical pain, increased Robaxin to 750 MG TID. Reports continued improvement in swallowing. SLP advanced to mechanical soft diet. Pending rehab.  8/30: KEON. Neuro stable. Patient reports significant posterior shoulder/neck pain attributing it to sleeping in his c-collar. Advised him to only wear it out of bed. Pending rehab.

## 2022-08-26 NOTE — OP NOTE
[facility]  Operative Note    Date: 08/26/2022    Pre-Op Diagnosis:   Cervical spinal mass  BPH s/p TURP x2.  Patient Active Problem List    Diagnosis Date Noted    Cervical spinal mass 05/24/2022    Unilateral inguinal hernia with obstruction and without gangrene 01/14/2019    Erectile dysfunction 09/16/2014    Hepatitis C 12/17/2013    Osteoarthritis of shoulder 12/11/2012    Lupus anticoagulant disorder 12/04/2012    Abnormal partial thromboplastin time (PTT) 11/20/2012    Encounter for preventive health examination 10/11/2012    Immunization due 10/11/2012    HTN (hypertension), benign 10/11/2012    Shoulder pain 10/11/2012      Post-Op Diagnosis:   Same    Procedure(s) Performed:   1. Cystoscopy   2. Placement of nazario catheter over wire.     Specimen(s): None    Staff Surgeon: Edgar Linder MD    Assistant Surgeon: Audra Gonzalez MD     Anesthesia: General endotracheal anesthesia    Indications: Jose Davis is a 77 y.o. male with a history of cervical spine mass presenting for neurosurgery.   Upon insertion of nazario in the OR there was no return of urine. Neurosurgery consulted urology. Of note he had had 2 TURPs in the past.     Findings:   TURP defect with small false passage posteriorly distal to bladder neck.     Estimated Blood Loss: min    Drains: 16 Fr councill nazario     Procedure in Detail:      The penis was prepped and draped in the usual sterile fashion.  A flexible cystoscope was introduced into the penis.  This revealed a TURP defect and a prostatic fossa false passage.  A guide wire was passed through the scope, through the urethral lumen and into the patient's bladder. The scope was removed and a 16 Fr Councill Tip nazario was placed in the bladder over the wire.  There was return of clear urine. The nazario was attached to a  bag and secured to the patient's leg.  The patient tolerated the procedure well.    The surgery was turned over to the primary team.      Disposition:  The patient will get a voiding trial POD1.       Audra Gonzalez MD

## 2022-08-26 NOTE — ASSESSMENT & PLAN NOTE
Jose Davis is a 77 y.o. 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    - Admit to neurosurgery floor.   -q4h neuro checks  - All pertinent labs and diagnostics personally reviewed.  - Post op x-ray with hardware in good positioning.  - Posterior HV drain with 100 cc output. Anterior drain with 10 cc output, may remove today. Abx while in place.  - Pain control with multimodal regimen. Weaning IV dilaudid to off.  - DVT ppx: TEDs/SCDs/SQH  - Activity: PT/OT; OOB; c-collar when out of bed.  - Diet: soft diet. F/u SLP recs.  - Bowel regimen: senna, miralax daily.  - Urinary: f/u voiding status after nazario removal today. Per Urology who was consulted for difficult nazario placement intraop, perform voiding trial POD 1.  - Atelectasis ppx: Encourage IS hourly, up in chair >6 hrs a day.  - Please contact neurosurgery with any exam changes.    Discussed with Dr. Hines    Dispo: ongoing

## 2022-08-26 NOTE — PLAN OF CARE
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]  S/P cervical spinal fusion [Z98.1]  Cervical spinal mass [G95.89]  Cervical stenosis of spine [M48.02]    Admission Date: 8/25/2022  Expected Discharge Date: 8/29/2022    Discharge Barriers Identified: None    Payor: HU[employer]NA MANAGED MEDICARE / Plan: HU[employer]NA MEDICARE HMO / Product Type: Capitation /     Extended Emergency Contact Information  Primary Emergency Contact: Erika Appiah   [place]  Mobile Phone: [phone]  Relation: Daughter  Secondary Emergency Contact: Kalri Davis  Relation: Spouse    Discharge Plan A: Home with family, Home Health  Discharge Plan B: Home with family      [id number] - [address] [address]  Phone: [phone] Fax: [phone]    CM obtained discharge planning assessment from patient.  Patient provided with discharge planning booklet.    Initial Assessment (most recent)     Adult Discharge Assessment - 08/26/22 1319        Discharge Assessment    Assessment Type Discharge Planning Assessment     Confirmed/corrected address, phone number and insurance Yes     Confirmed Demographics Correct on Facesheet     Source of Information patient     Communicated LAUREN with patient/caregiver Yes     Reason For Admission hardware failure of anterior column     Lives With spouse     Do you expect to return to your current living situation? Yes     Do you have help at home or someone to help you manage your care at home? Yes     Who are your caregiver(s) and their phone number(s)? Karli Davis - spouse [phone]     Prior to hospitilization cognitive status: Alert/Oriented     Current cognitive status: Alert/Oriented     Walking or Climbing Stairs Difficulty ambulation difficulty, requires equipment      Mobility Management cane     Equipment Currently Used at Home cane, straight     Readmission within 30 days? No     Patient currently being followed by outpatient case management? No     Do you currently have service(s) that help you manage your care at home? No     Do you take prescription medications? Yes     Do you have prescription coverage? Yes     Coverage HU[employer] MANAGED MEDICARE - HU[employer]NA MEDICARE HMO     Do you have any problems affording any of your prescribed medications? No     Is the patient taking medications as prescribed? yes     Who is going to help you get home at discharge? family     How do you get to doctors appointments? family or friend will provide     Are you on dialysis? No     Do you take coumadin? No     Discharge Plan A Home with family;Home Health     Discharge Plan B Home with family     DME Needed Upon Discharge  other (see comments)   tbd    Discharge Plan discussed with: Patient     Discharge Barriers Identified None        Relationship/Environment    Name(s) of Who Lives With Patient Karli

## 2022-08-26 NOTE — PROGRESS NOTES
Dr. Garcia at bedside to assess patient. Patient will answer his birth year for his location during neuro exam. Patient has been drowsy and occasionally difficult to understand but arousing and following commands. MD davidson.

## 2022-08-26 NOTE — HPI
Jose Davis is a 77 y.o. 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 Medina. The patient was recently seen in clinic in f/u and endorsed dysphagia. Denies fever or chills. He is compliant with the cervical collar and bone stimulator. Denies weakness, b/b dysfunction, saddle anesthesia, or gait instability.     CT c spine showed stable placement of the C5 corpectomy cage however, the anterior cervical plate from C4-6 had backed out in addition to the screws going into the C4 and C6 vertebral bodies. He was thus offered an anterior cervical revision of his plate and screws and a posterior decompression/fusion in order to further stabilize his construct and to further decompress his neural elements from the remaining posterior cervical extradural lesion.

## 2022-08-26 NOTE — PLAN OF CARE
Problem: Physical Therapy  Goal: Physical Therapy Goal  Description: Goals to be met by: 22     Patient will increase functional independence with mobility by performin. Supine to sit with [place]  2. Sit to stand transfer with Modified [place] using RW  3. Gait  x 100 feet with Modified [place] using Rolling Walker.   4. Sitting at edge of bed x5 minutes with [place] while performing LE therex without posterior lean.    Outcome: Ongoing, Progressing   PT evaluation complete, appropriate goals created

## 2022-08-26 NOTE — PT/OT/SLP EVAL
Physical Therapy Evaluation    Patient Name:  Jose Davis   MRN:  [id number]    Co-evaluation w/ OT 2/2 suspected pt complexity.  Recommendations:     Discharge Recommendations:  rehabilitation facility   Discharge Equipment Recommendations: walker, rolling   Barriers to discharge: Inaccessible home and Decreased caregiver support    Assessment:     Jose Davis is a 77 y.o. 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  He presents with the following impairments/functional limitations:  weakness, impaired self care skills, impaired functional mobility, gait instability, impaired balance, impaired cognition, decreased safety awareness, orthopedic precautions. Pt very impulsive, attempting to climb out of bed through the bed rails upon PT arrival, and needed frequent redirecting and refocusing on task/question asked. Pt required Portia for bed mobility and walking w/ the RW and demonstrates a severe posterior lean in sitting and standing leading to increased fall risk. Pt reports he has vertigo and when needing to change directions while walking he will sit on the floor and turn around, showing poor safety awareness. Pt also continuously attempting to turn his neck despite pt education and tightening of C Collar. Pt would benefit from d/c to Rehab to work on the above listed impairments.    Rehab Prognosis: Good; patient would benefit from acute skilled PT services to address these deficits and reach maximum level of function.    Recent Surgery: Procedure(s) (LRB):  DISCECTOMY, SPINE, CERVICAL, ANTERIOR APPROACH, WITH FUSION C4-6 revision (N/A)  FUSION, SPINE, CERVICAL, POSTERIOR APPROACH C3-T1 Fusion C4-C6  laminectomy (N/A)  CATHETERIZATION, URETHRA  CYSTOSCOPY (N/A) 1 Day Post-Op    Plan:     During this hospitalization, patient to be seen 4 x/week to address the identified rehab impairments via gait training, therapeutic activities, therapeutic exercises, neuromuscular  "re-education and progress toward the following goals:    · Plan of Care Expires:  09/26/22    Subjective     Chief Complaint: vertigo  Patient/Family Comments/goals: pt wants to get back home  Pain/Comfort:  · Location 1:  (neck pain, unrated)    Patients cultural, spiritual, Sabianist conflicts given the current situation: no    Living Environment:  Pt lives in a mobile home with his wife and has 3 KRISTEL w/ B railings.  Prior to admission, patients level of function was Independent.  Equipment used at home: cane, straight.  DME owned (not currently used): shower chair.  Upon discharge, patient will have assistance from wife.    Objective:     Communicated with RN prior to session.  Patient found supine with SCD, telemetry, hemovac, bed alarm, nazario catheter  upon PT entry to room.    General Precautions: Standard, fall   Orthopedic Precautions:spinal precautions   Braces: Aspen collar  Respiratory Status: Room air    Exams:  · Postural Exam:  Patient presented with the following abnormalities:    · -       Rounded shoulders  · -       Anterior pelvic tilt  · Sensation:    · -       Intact  · RLE ROM: WFL  · RLE Strength: WFL  · LLE ROM: WFL  · LLE Strength: WFL    Functional Mobility:  · Bed Mobility:     · Supine to Sit: minimum assistance  · Sit to Supine: minimum assistance  · Transfers:     · Sit to Stand:  contact guard assistance with rolling walker  · Gait: 20' w/ RW and Portia w/ max cueing for posture 2/2 posterior lean  · Balance: static sitting balance CGA, dynamic sitting balance modA 2/2 posterior lean, standing balance Portia w/ RW    Therapeutic Activities and Exercises:   Supine to sit Portia  Seated strength and sensation assessment  Seated balance  Sts w/ RW and CGA  20' w/ RW and Portia  Neuro re-ed on finding upright posture as pt's perceived "upright" is w/ mod posterior lean   Sit to supine with Portia  Pt educated on PT POC and treatment  Pt educated on posture and finding midline  Pt educated on " safety and not getting up alone  Pt educated on C collar and not turning neck    AM-PAC 6 CLICK MOBILITY  Total Score:18     Patient left supine with all lines intact, call button in reach and bed alarm on.    GOALS:   Multidisciplinary Problems       Physical Therapy Goals          Problem: Physical Therapy    Goal Priority Disciplines Outcome Goal Variances Interventions   Physical Therapy Goal     PT, PT/OT Ongoing, Progressing     Description: Goals to be met by: 22     Patient will increase functional independence with mobility by performin. Supine to sit with [place]  2. Sit to stand transfer with Modified [place] using RW  3. Gait  x 100 feet with Modified [place] using Rolling Walker.   4. Sitting at edge of bed x5 minutes with [place] while performing LE therex without posterior lean.                         History:     Past Medical History:   Diagnosis Date    Enlarged prostate     Hypertension        Past Surgical History:   Procedure Laterality Date    ANTERIOR CERVICAL DISCECTOMY W/ FUSION N/A 2022    Procedure: DISCECTOMY, SPINE, CERVICAL, ANTERIOR APPROACH, WITH FUSION C4-6 revision;  Surgeon: Abelino Hines DO;  Location: [facility] OR [address] [place] [address];  Service: Neurosurgery;  Laterality: N/A;  regular bed, supine, yuli, neuromonitoring, Meredith Fioreman, type and cross 2 units     cyst excision from neck      cystoscope      CYSTOSCOPY N/A 2022    Procedure: CYSTOSCOPY;  Surgeon: Edgar Linder MD;  Location: [facility] OR [address] [place] [address];  Service: Urology;  Laterality: N/A;    FUSION OF CERVICAL SPINE BY POSTERIOR APPROACH N/A 2022    Procedure: FUSION, SPINE, CERVICAL, POSTERIOR APPROACH C3-T1 Fusion C4-C6  laminectomy;  Surgeon: Abelino Hines DO;  [facility];  Service: Neurosurgery;  Laterality: N/A;  regualr bed, prone, diehl, yuli, neuromonitoring, Meredith fioreman    HERNIA REPAIR      nephrectomy  2006    left for a tumor?    PROSTATE  SURGERY      ROTATOR CUFF REPAIR      right    SURGICAL REMOVAL OF VERTEBRAL BODY OF CERVICAL SPINE N/A 5/24/2022    Procedure: CORPECTOMY, SPINE, CERVICAL -C5 corpectomy with C4-6 fusion;  Surgeon: Yaya Medina MD;  Location: [facility] OR [address] [place] [address];  Service: Neurosurgery;  Laterality: N/A;  neuromonitoring, krista  headset with traction, Wiley J brace, c-arm, spinewave    TONSILLECTOMY      URETHRAL CATHETERIZATION  8/25/2022    Procedure: CATHETERIZATION, URETHRA;  Surgeon: Edgar Linder MD;  Location: [facility] OR [address] [place] [address];  Service: Urology;;       Time Tracking:     PT Received On: 08/26/22  PT Start Time: 0939     PT Stop Time: 1013  PT Total Time (min): 34 min     Billable Minutes: Evaluation 5, Gait Training 15 and Neuromuscular Re-education 14      08/26/2022

## 2022-08-26 NOTE — NURSING
Received pt at 2230 VSS . Pt oriented to self , situationt time and  place. Calm and cooperative Hemovac x 2 . Anterior incision w Dermabond KIERA , Posterior incision w island drsg small dry blood. Sites at scalp oozing but stopped quickly. Rosario in place. At 2305  Pt was heard loudly screaming. Said was in pain Dilaudid 1 mg iv given. Pt  Cont to scream . MD notified and additional med ordered but not given because pt stopped talking stiffened head to rt .   MD notified and came to see Pt who had began to talk to MD . Pain med held. Pt confused. Then Pt began climbing OOB despite attempts to remain in bed MD notified restraints applied. PT remained confused but calm.

## 2022-08-26 NOTE — SUBJECTIVE & OBJECTIVE
Interval History: POD 1. Patient with some agitation and disorientation overnight. Appears to have resolved this am. Patient is in no distress, Aox4. Worked well with therapy this am out of bed. States his pain is well controlled. Has not noticed much improvement with his swallowing but no worsening symptoms. R deltoid weakness but attributes this to pain. Denies numbness. Rosario in place, will remove today and f/u voiding.    Medications:  Continuous Infusions:   sodium chloride 0.9% 75 mL/hr at 08/25/22 2058     Scheduled Meds:   acetaminophen  650 mg Oral Q6H    amLODIPine  10 mg Oral QAM    ceFAZolin (ANCEF) IVPB  2 g Intravenous Q8H    donepeziL  10 mg Oral QAM    gabapentin  200 mg Oral TID    heparin (porcine)  5,000 Units Subcutaneous Q8H    HYDROmorphone  1 mg Intravenous Once    methocarbamoL  500 mg Oral QID    senna  8.6 mg Oral BID    traZODone  50 mg Oral QHS     PRN Meds:HYDROmorphone, melatonin, OLANZapine, oxyCODONE-acetaminophen, oxyCODONE-acetaminophen, polyethylene glycol     Review of Systems  Objective:     Weight: 56.2 kg (124 lb)  Body mass index is 20.63 kg/m².  Vital Signs (Most Recent):  Temp: 96.3 °F (35.7 °C) (08/26/22 0734)  Pulse: 72 (08/26/22 0734)  Resp: 16 (08/26/22 0810)  BP: (!) 160/77 (08/26/22 0734)  SpO2: 97 % (08/26/22 0734)   Vital Signs (24h Range):  Temp:  [96.3 °F (35.7 °C)-98.6 °F (37 °C)] 96.3 °F (35.7 °C)  Pulse:  [] 72  Resp:  [15-19] 16  SpO2:  [93 %-100 %] 97 %  BP: (122-166)/(74-99) 160/77     Date 08/26/22 0700 - 08/27/22 0659   Shift 2879-8660 4904-5984 1505-3463 24 Hour Total   INTAKE   Shift Total(mL/kg)       OUTPUT   Drains 110   110   Shift Total(mL/kg) 110(2)   110(2)   Weight (kg) 56.2 56.2 56.2 56.2                        Closed/Suction Drain 08/25/22 1815 Left;Anterior Neck Accordion 10 Fr. (Active)   Site Description Healing 08/25/22 2240   Drainage Serosanguineous 08/26/22 0921   Status To bulb suction 08/26/22 0921   Output (mL) 10 mL 08/26/22  "0921            Closed/Suction Drain 08/25/22 1953 Posterior Back Accordion 10 Fr. (Active)   Site Description Unable to view 08/25/22 2130   Drainage Serosanguineous 08/26/22 0921   Status To bulb suction 08/26/22 0921   Output (mL) 100 mL 08/26/22 0921            Urethral Catheter 08/25/22 1500  16 Fr. (Active)   Site Assessment Clean;Intact;Dry 08/26/22 0810   Collection Container Urimeter 08/26/22 0810   Securement Method secured to top of thigh w/ adhesive device 08/26/22 0810   Catheter Care Performed yes 08/26/22 0810   Reason for Continuing Urinary Catheterization Placed by Urology Service 08/26/22 0810   CAUTI Prevention Bundle Securement Device in place with 1" slack;Intact seal between catheter & drainage tubing;Drainage bag/urimeter off the floor;Sheeting clip in use;No dependent loops or kinks;Drainage bag/urimeter not overfilled (<2/3 full);Drainage bag/urimeter below bladder 08/26/22 0810   Output (mL) 875 mL 08/25/22 2200       Neurosurgery Physical Exam  General: Well developed, well nourished, not in acute distress.   Head: Normocephalic, atraumatic.  Neck: C-collar in place.  Neurologic: Alert and oriented x 4. Thought content appropriate.  GCS: Motor:6  Verbal:5  Eyes:4   GCS Total: 15  Language: No aphasia.  Speech: No dysarthria.  Cranial nerves: Face symmetric, tongue midline, CN II-XII grossly intact.   Eyes: Pupils equal, round, reactive to light with accomodation, EOMI.   Pulmonary: Normal respirations, no signs of respiratory distress.  Abdomen: Soft, non-distended, non-tender to palpation.  Sensory: Intact to light touch throughout.  Motor Strength: Moves all extremities spontaneously with good tone. No abnormal movements seen.     Strength  Deltoids Triceps Biceps Wrist Extension Wrist Flexion Hand    Upper: R 4-/5 5/5 5/5 5/5 5/5 5/5    L 5/5 5/5 5/5 5/5 5/5 5/5     Hip Flexion Knee Extension Knee  Flexion Dorsiflexion Plantar flexion EHL   Lower: R 5/5 5/5 5/5 5/5 5/5 5/5    L " 5/5 5/5 5/5 5/5 5/5 5/5     DTRs: 2+ and symmetric in UEs and LEs.  Vascular: No LE edema.   Skin: Skin is warm, dry and intact.    Anterior cervical incision with dermabond. No dehiscence, drainage or underlying collection.    Posterior cervical incision with island border, minimal drainage.     HV drains in place.    Significant Labs:  Recent Labs   Lab 08/25/22  1312   GLU 92      K 3.7      CO2 25   BUN 13   CREATININE 1.0   CALCIUM 9.7     Recent Labs   Lab 08/25/22  1312   WBC 6.07   HGB 12.9*   HCT 37.8*        Recent Labs   Lab 08/25/22  1312   INR 1.2   APTT 94.7*     Microbiology Results (last 7 days)       Procedure Component Value Units Date/Time    Aerobic culture [[id number]] Collected: 08/25/22 1610    Order Status: Completed Specimen: Bone from Neck Updated: 08/26/22 0730     Aerobic Bacterial Culture No growth    Narrative:      1. Hardware - culture    Culture, Anaerobe [[id number]] Collected: 08/25/22 1610    Order Status: Completed Specimen: Bone from Neck Updated: 08/26/22 0650     Anaerobic Culture Culture in progress    Narrative:      1. Hardware - culture    Gram stain [[id number]] Collected: 08/25/22 1610    Order Status: Completed Specimen: Bone from Neck Updated: 08/25/22 1755     Gram Stain Result Rare WBC's      No organisms seen    Narrative:      1. Hardware - culture    Fungus culture [[id number]] Collected: 08/25/22 1610    Order Status: Sent Specimen: Bone from Neck Updated: 08/25/22 1626    AFB Culture & Smear [[id number]] Collected: 08/25/22 1610    Order Status: Sent Specimen: Bone from Neck Updated: 08/25/22 1626          All pertinent labs from the last 24 hours have been reviewed.    Significant Diagnostics:  I have reviewed and interpreted all pertinent imaging results/findings within the past 24 hours.

## 2022-08-26 NOTE — NURSING TRANSFER
Nursing Transfer Note      8/25/2022     Reason patient is being transferred: meets criteria    Transfer To: Xray then NPU    Transfer via bed    Transfer with iv pole    Transported by PCT    Medicines sent: iv fluids    Any special needs or follow-up needed: none    Chart send with patient: Yes    Notified: spouse    Patient reassessed at: /25 at 2200

## 2022-08-26 NOTE — BRIEF OP NOTE
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]     * S/P cervical spinal fusion [Z98.1]     * Cervical spinal mass [G95.89]    Procedure(s) (LRB):  DISCECTOMY, SPINE, CERVICAL, ANTERIOR APPROACH, WITH FUSION C4-6 revision (N/A)  FUSION, SPINE, CERVICAL, POSTERIOR APPROACH C3-6 laminectomy and fusion (N/A)  CATHETERIZATION, URETHRA  CYSTOSCOPY (N/A)    Anesthesia: General    Operative Findings: (1) Revision/replacement of anterior C spine plate over C4-C6 (2) Posterior cervical fusion C3-T1 and C4-C6 laminectomies    Estimated Blood Loss: * No values recorded between 8/25/2022  3:22 PM and 8/25/2022  8:22 PM *    Estimated Blood Loss has been documented.         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 JOSE RAMESH.    Release to patient Immediate        08/25/22 1855                [id number]

## 2022-08-26 NOTE — PLAN OF CARE
Problem: Occupational Therapy  Goal: Occupational Therapy Goal  Description: Goals to be met by: 9/26/2022 (1 month)     Patient will increase functional independence with ADLs by performing:    UE Dressing with Supervision.  LE Dressing with Supervision.  Grooming while standing at sink with Supervision.  Toileting from toilet with Supervision for hygiene and clothing management.   Supine to sit with Supervision.  Step transfer with Supervision  Toilet transfer to toilet with Supervision.    Evaluated pt and established OT POC. Continue OT as tolerated.  Bhavana Elizabeth OT  8/26/2022    Outcome: Ongoing, Progressing

## 2022-08-26 NOTE — TRANSFER OF CARE
"Anesthesia Transfer of Care Note    Patient: Jose Davis    Procedure(s) Performed: Procedure(s) (LRB):  DISCECTOMY, SPINE, CERVICAL, ANTERIOR APPROACH, WITH FUSION C4-6 revision (N/A)  FUSION, SPINE, CERVICAL, POSTERIOR APPROACH C3-T1 Fusion C4-C6  laminectomy (N/A)  CATHETERIZATION, URETHRA  CYSTOSCOPY (N/A)    Patient location: PACU    Transport from OR: Transported from OR on 6-10 L/min O2 by face mask with adequate spontaneous ventilation    Post pain: adequate analgesia    Post assessment: no apparent anesthetic complications    Post vital signs: stable    Level of consciousness: sedated    Nausea/Vomiting: no nausea/vomiting    Complications: none    Transfer of care protocol was followed      Last vitals:   Visit Vitals  /76 (BP Location: Left arm, Patient Position: Lying)   Pulse 78   Temp 36.7 °C (98.1 °F) (Temporal)   Resp 17   Ht 5' 5" (1.651 m)   Wt 56.2 kg (124 lb)   SpO2 98%   BMI 20.63 kg/m²     "

## 2022-08-26 NOTE — PLAN OF CARE
Bedside swallow assessment completed. Recommend pt continue soft diet with thin liquids. ST to f/u for diet tolerance and advancement. Lois Conde CCC-SLP 8/26/2022 11:49 AM

## 2022-08-26 NOTE — OP NOTE
DATE OF PROCEDURE: 8/25/2022      PREOPERATIVE DIAGNOSIS:  1. 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     PROCEDURE PERFORMED:  1. Revision of prior C4-6 anterior plate and screws  2. Posterior spinal fusion, C3-T1  2. Posterior segmental spinal fixation, C3-T1 (Spine wave)  3. Decompression of thecal sac and nerve roots via laminectomy, C4-6  4. Debulking of extradural mass  4. Use of intraoperative flouroscopy  5. Use of intraoperative neuromonitoring with MEPs  6. Synthetic bone grafting     PRIMARY SURGEON: Abelino Hines DO    Co surgeon: Leonard Camacho MD ENT for anterior neck dissection    ASSISTANT: Alanna Garcia MD     ANESTHESIA: GETA     ESTIMATED BLOOD LOSS: 100mL     COMPLICATIONS: None     DRAINS: One anterior HV drain, one posterior HV drain     SPECIMENS SENT: Posterior cervical extradural lesion     FINDINGS: None     INDICATIONS:     Jose Davis is a 77 y.o. male s/p anterior C5 corpectomy for epidural tumor and ACD C4-6 on 5/24/22 performed by my partner Dr. Medina.  The patient was recently seen in clinic in  and endorsed dysphagia. Denies fever or chills. He is compliant with the cervical collar and bone stimulator. Denies weakness, b/b dysfunction, saddle anesthesia, or gait instability.    CT c spine showed stable placement of the C5 corpectomy cage however, the anterior cervical plate from C4-6 had backed out in addition to the screws going into the C4 and C6 vertebral bodies.  He was thus offered an anterior cervical revision of his plate and screws and a posterior decompression/fusion in order to further stabilize his construct and to further decompress his neural elements from the remaining posterior cervical extradural lesion.      I explained the risks, benefits, alternatives, indications and methods of the procedure in detail. The patient voiced understanding and all questions were answered. No guarantees were  made. The patient agreed to proceed as planned.    PROCEDURE:     The patient was brought into the operating room where he was intubated and placed under general anesthesia without difficulty. All lines were placed.  He was then transferred to the OR table supine with his head in slight extension.  All pressure points were appropriately padded.  The patient was then prepped and draped in the usual sterile fashion.    Please see Dr. Camacho's op note for details of the anterior neck dissection.    Once the anterior neck dissection was finished we were able to clearly visualize the anterior cervical plate.  We then placed our trimline retractors.  We dissected scar tissue off the plate and then removed the prior plate and screws which was passed off the field.  We then used pituitaries and curettes to remove bone graft that was located anterior to the corpectomy cage which had appeared to push the plate up and away from the vertebral bodies.  After this we were clearly able to identify the corpectomy cage and the adjacent endplates.  We then used bovie electrocautery to remove more scar tissue from the inferior C4 endplate and superior C6 endplate.  We used high speed drill to remove osteophytes at the inferior C4 body and superior C6 vertebral body.  Once this was done there appeared to be a flatter bony surface to place the plate.  We then placed a size 26mm plate across the corpectomy cage and this sat flush against the adjacent endplates.  We used high speed drill to make  holes in the C4, 6 bodies and then placed 14mm variable screws.  Once the screw were fully seated the plate sat flush against the vertebral bodies.  Fluoro showed excellent placement of all hardware.  The plate was finally locked.  Motors were consistent with baseline following this.  We then copiously irrigated the wound with betadine and saline solution and obtained hemostasis with surgiflo and bipolar.  We then left a HV drain in the  prevertebral space and tunneled it out of the wound.  It was secured with vicryl suture.  We placed depomedrol over the esophagus and the closed the wound in layers with 3/0 vicryl and subcuticular monocryl for the skin.  Dermabond was placed over the incision.  The patient was then transferred to his hospital bed where diehl pins were placed bitemporally.  He was then flipped prone onto the OR table with his head fixated to the diehl in a neutral position.  All appropriate pressure points were padded.  Fluoro showed excellent spinal alignment.  We then planned our incision from approximately C3-T1.  The patient was then prepped and draped in the usual sterile fashion.  Incision was made with 10 blade and bovie was used for supra and subfascial dissection.  Subperiosteal dissection was carried out with bovie and bar elevators until we had exposed the posterior elements from C3-T1.  We were able to see the extradural mass involving the right C4/5 facet after our exposure.    First, we placed bilateral T1 pedicle screws using anatomic landmarks and free hand technique.  Fluoro showed excellent placement of hardware and motors were consistent with baseline.    Next, lateral mass screw tracts were prepared bilaterally from C3-C6 using freehand technique and anatomic landmarks. A decompressive laminectomy was then performed from C4-6 using the Leksell rongeur, high speed drill and Kerrison punches.  After the decompression we were able to visualize the right C4/5 extradural mass which was compressing the lateral aspect of the thecal sac.  We sent a specimen to pathology and then debulked the mass with bipolar and micropituitaries until the thecal sac was well decompressed.  We then obtained hemostasis with surgiflo.     Lateral mass screws were then placed in the previously prepared tracts skipping the right C5 lateral mass secondary to poor bone quality and left C6 lateral mass also due to poor bone quality.  AP  and lateral xray showed excellent position of all screws. Titanium rods were sized, contoured and reduced into the tulip heads. Set screws were finally tightened.. Final xrays showed excellent spinal alignment and hardware position.     The wound was copiously irrigated with a dilute Betadine solution and normal saline.   Exposed bony surfaces from C3-T1 were decorticated with a high-speed drill.  Synthetic bone was  placed bilaterally for arthrodesis from C3-T1.  One gram of vancomycin powder was placed into the wound. A subfascial Hemovac drains was placed and tunneled through a separate incision, where it was secured with Vicryl sutures.  A watertight fascial closure was achieved with interrupted 0 vicryl sutures and a running Stratafix suture.  The soft tissues were closed in layers and the skin was closed with running monocryl.  A preneo dressing was then applied and covered with dermabond.     The patient appeared to tolerate the procedure well from a hemodynamic and neuromonitoring standpoint.  Motor evoked potentials were present and stable in all extremities throughout the case. I was present for all critical portions of the case, and at the end of the case all counts were correct. He was removed from the Ochoa clamp and repositioned supine onto the hospital bed where he was extubated and allowed to emerge from anesthesia without difficulty.  He was sent to the PACU in stable condition for recovery.    Justification of cosurgeon:  This was a revision of backed out anterior cervical plate in a patient with a hostile neck.  It was felt that Dr. Camacho with ENT was needed in order to safely perform the anterior neck dissection in order to limit blood loss, safely expose the hardware, and to optimize patient outcomes.

## 2022-08-27 LAB
ANION GAP SERPL CALC-SCNC: 7 MMOL/L (ref 8–16)
BASOPHILS # BLD AUTO: 0.02 K/UL (ref 0–0.2)
BASOPHILS NFR BLD: 0.2 % (ref 0–1.9)
BUN SERPL-MCNC: 13 MG/DL (ref 8–23)
CALCIUM SERPL-MCNC: 9.1 MG/DL (ref 8.7–10.5)
CHLORIDE SERPL-SCNC: 103 MMOL/L (ref 95–110)
CO2 SERPL-SCNC: 25 MMOL/L (ref 23–29)
CREAT SERPL-MCNC: 0.9 MG/DL (ref 0.5–1.4)
DIFFERENTIAL METHOD: ABNORMAL
EOSINOPHIL # BLD AUTO: 0.1 K/UL (ref 0–0.5)
EOSINOPHIL NFR BLD: 1.1 % (ref 0–8)
ERYTHROCYTE [DISTWIDTH] IN BLOOD BY AUTOMATED COUNT: 13.2 % (ref 11.5–14.5)
EST. GFR  (NO RACE VARIABLE): >60 ML/MIN/1.73 M^2
GLUCOSE SERPL-MCNC: 117 MG/DL (ref 70–110)
HCT VFR BLD AUTO: 34 % (ref 40–54)
HGB BLD-MCNC: 12 G/DL (ref 14–18)
IMM GRANULOCYTES # BLD AUTO: 0.05 K/UL (ref 0–0.04)
IMM GRANULOCYTES NFR BLD AUTO: 0.5 % (ref 0–0.5)
LYMPHOCYTES # BLD AUTO: 1.5 K/UL (ref 1–4.8)
LYMPHOCYTES NFR BLD: 14.1 % (ref 18–48)
MAGNESIUM SERPL-MCNC: 2.1 MG/DL (ref 1.6–2.6)
MCH RBC QN AUTO: 30.5 PG (ref 27–31)
MCHC RBC AUTO-ENTMCNC: 35.3 G/DL (ref 32–36)
MCV RBC AUTO: 87 FL (ref 82–98)
MONOCYTES # BLD AUTO: 0.9 K/UL (ref 0.3–1)
MONOCYTES NFR BLD: 9 % (ref 4–15)
NEUTROPHILS # BLD AUTO: 7.9 K/UL (ref 1.8–7.7)
NEUTROPHILS NFR BLD: 75.1 % (ref 38–73)
NRBC BLD-RTO: 0 /100 WBC
PHOSPHATE SERPL-MCNC: 2.8 MG/DL (ref 2.7–4.5)
PLATELET # BLD AUTO: 225 K/UL (ref 150–450)
PMV BLD AUTO: 9.9 FL (ref 9.2–12.9)
POTASSIUM SERPL-SCNC: 3.4 MMOL/L (ref 3.5–5.1)
RBC # BLD AUTO: 3.93 M/UL (ref 4.6–6.2)
SODIUM SERPL-SCNC: 135 MMOL/L (ref 136–145)
WBC # BLD AUTO: 10.47 K/UL (ref 3.9–12.7)

## 2022-08-27 PROCEDURE — 80048 BASIC METABOLIC PNL TOTAL CA: CPT | Performed by: STUDENT IN AN ORGANIZED HEALTH CARE EDUCATION/TRAINING PROGRAM

## 2022-08-27 PROCEDURE — 25000003 PHARM REV CODE 250

## 2022-08-27 PROCEDURE — 63600175 PHARM REV CODE 636 W HCPCS

## 2022-08-27 PROCEDURE — 11000001 HC ACUTE MED/SURG PRIVATE ROOM

## 2022-08-27 PROCEDURE — 25000003 PHARM REV CODE 250: Performed by: STUDENT IN AN ORGANIZED HEALTH CARE EDUCATION/TRAINING PROGRAM

## 2022-08-27 PROCEDURE — 85025 COMPLETE CBC W/AUTO DIFF WBC: CPT | Performed by: STUDENT IN AN ORGANIZED HEALTH CARE EDUCATION/TRAINING PROGRAM

## 2022-08-27 PROCEDURE — 63600175 PHARM REV CODE 636 W HCPCS: Performed by: STUDENT IN AN ORGANIZED HEALTH CARE EDUCATION/TRAINING PROGRAM

## 2022-08-27 PROCEDURE — 36415 COLL VENOUS BLD VENIPUNCTURE: CPT | Performed by: STUDENT IN AN ORGANIZED HEALTH CARE EDUCATION/TRAINING PROGRAM

## 2022-08-27 PROCEDURE — 84100 ASSAY OF PHOSPHORUS: CPT | Performed by: STUDENT IN AN ORGANIZED HEALTH CARE EDUCATION/TRAINING PROGRAM

## 2022-08-27 PROCEDURE — 83735 ASSAY OF MAGNESIUM: CPT | Performed by: STUDENT IN AN ORGANIZED HEALTH CARE EDUCATION/TRAINING PROGRAM

## 2022-08-27 RX ORDER — SODIUM,POTASSIUM PHOSPHATES 280-250MG
2 POWDER IN PACKET (EA) ORAL ONCE
Status: COMPLETED | OUTPATIENT
Start: 2022-08-27 | End: 2022-08-27

## 2022-08-27 RX ADMIN — METHOCARBAMOL 500 MG: 500 TABLET ORAL at 09:08

## 2022-08-27 RX ADMIN — ACETAMINOPHEN 650 MG: 325 TABLET ORAL at 06:08

## 2022-08-27 RX ADMIN — HYDROMORPHONE HYDROCHLORIDE 1 MG: 1 INJECTION, SOLUTION INTRAMUSCULAR; INTRAVENOUS; SUBCUTANEOUS at 11:08

## 2022-08-27 RX ADMIN — POTASSIUM & SODIUM PHOSPHATES POWDER PACK 280-160-250 MG 2 PACKET: 280-160-250 PACK at 05:08

## 2022-08-27 RX ADMIN — AMLODIPINE BESYLATE 10 MG: 10 TABLET ORAL at 06:08

## 2022-08-27 RX ADMIN — TRAZODONE HYDROCHLORIDE 50 MG: 50 TABLET ORAL at 08:08

## 2022-08-27 RX ADMIN — HEPARIN SODIUM 5000 UNITS: 5000 INJECTION INTRAVENOUS; SUBCUTANEOUS at 03:08

## 2022-08-27 RX ADMIN — HYDROMORPHONE HYDROCHLORIDE 1 MG: 1 INJECTION, SOLUTION INTRAMUSCULAR; INTRAVENOUS; SUBCUTANEOUS at 09:08

## 2022-08-27 RX ADMIN — Medication 2 G: at 06:08

## 2022-08-27 RX ADMIN — Medication 2 G: at 03:08

## 2022-08-27 RX ADMIN — GABAPENTIN 200 MG: 100 CAPSULE ORAL at 08:08

## 2022-08-27 RX ADMIN — DONEPEZIL HYDROCHLORIDE 10 MG: 10 TABLET ORAL at 06:08

## 2022-08-27 RX ADMIN — Medication 2 G: at 11:08

## 2022-08-27 RX ADMIN — OXYCODONE HYDROCHLORIDE AND ACETAMINOPHEN 1 TABLET: 5; 325 TABLET ORAL at 04:08

## 2022-08-27 RX ADMIN — HEPARIN SODIUM 5000 UNITS: 5000 INJECTION INTRAVENOUS; SUBCUTANEOUS at 06:08

## 2022-08-27 RX ADMIN — SENNOSIDES 8.6 MG: 8.6 TABLET ORAL at 09:08

## 2022-08-27 RX ADMIN — GABAPENTIN 200 MG: 100 CAPSULE ORAL at 03:08

## 2022-08-27 RX ADMIN — OXYCODONE HYDROCHLORIDE AND ACETAMINOPHEN 1 TABLET: 5; 325 TABLET ORAL at 08:08

## 2022-08-27 RX ADMIN — OXYCODONE AND ACETAMINOPHEN 1 TABLET: 7.5; 325 TABLET ORAL at 09:08

## 2022-08-27 RX ADMIN — METHOCARBAMOL 500 MG: 500 TABLET ORAL at 04:08

## 2022-08-27 RX ADMIN — POLYETHYLENE GLYCOL 3350 17 G: 17 POWDER, FOR SOLUTION ORAL at 09:08

## 2022-08-27 RX ADMIN — METHOCARBAMOL 500 MG: 500 TABLET ORAL at 02:08

## 2022-08-27 RX ADMIN — SENNOSIDES 8.6 MG: 8.6 TABLET ORAL at 08:08

## 2022-08-27 RX ADMIN — ACETAMINOPHEN 650 MG: 325 TABLET ORAL at 11:08

## 2022-08-27 RX ADMIN — HEPARIN SODIUM 5000 UNITS: 5000 INJECTION INTRAVENOUS; SUBCUTANEOUS at 09:08

## 2022-08-27 RX ADMIN — GABAPENTIN 200 MG: 100 CAPSULE ORAL at 09:08

## 2022-08-27 RX ADMIN — METHOCARBAMOL 500 MG: 500 TABLET ORAL at 08:08

## 2022-08-27 NOTE — PROGRESS NOTES
[facility] - [facility] ([facility])  Neurosurgery  Progress Note    Subjective:     History of Present Illness: Jose Davis is a 77 y.o. 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 Medina. The patient was recently seen in clinic in f/u and endorsed dysphagia. Denies fever or chills. He is compliant with the cervical collar and bone stimulator. Denies weakness, b/b dysfunction, saddle anesthesia, or gait instability.     CT c spine showed stable placement of the C5 corpectomy cage however, the anterior cervical plate from C4-6 had backed out in addition to the screws going into the C4 and C6 vertebral bodies. He was thus offered an anterior cervical revision of his plate and screws and a posterior decompression/fusion in order to further stabilize his construct and to further decompress his neural elements from the remaining posterior cervical extradural lesion.      Post-Op Info:  Procedure(s) (LRB):  DISCECTOMY, SPINE, CERVICAL, ANTERIOR APPROACH, WITH FUSION C4-6 revision (N/A)  FUSION, SPINE, CERVICAL, POSTERIOR APPROACH C3-T1 Fusion C4-C6  laminectomy (N/A)  CATHETERIZATION, URETHRA  CYSTOSCOPY (N/A)   2 Days Post-Op     Interval History: patient doing well, no focal strength deficits, just some pain limitation. Incision looks good. Anterior neck drain with low output, will remove today. PT recommending IPR    Medications:  Continuous Infusions:      Scheduled Meds:   acetaminophen  650 mg Oral Q6H    amLODIPine  10 mg Oral QAM    ceFAZolin (ANCEF) IVPB  2 g Intravenous Q8H    donepeziL  10 mg Oral QAM    gabapentin  200 mg Oral TID    heparin (porcine)  5,000 Units Subcutaneous Q8H    HYDROmorphone  1 mg Intravenous Once    methocarbamoL  500 mg Oral QID    polyethylene glycol  17 g Oral Daily    potassium, sodium phosphates  2 packet Oral Once    senna  8.6 mg Oral BID    traZODone  50 mg Oral QHS     PRN Meds:HYDROmorphone, melatonin, OLANZapine,  "oxyCODONE-acetaminophen, oxyCODONE-acetaminophen     Review of Systems  Objective:     Weight: 56.2 kg (124 lb)  Body mass index is 20.63 kg/m².  Vital Signs (Most Recent):  Temp: 97.8 °F (36.6 °C) (08/27/22 1542)  Pulse: 78 (08/27/22 1542)  Resp: 17 (08/27/22 1617)  BP: (!) 145/80 (08/27/22 1542)  SpO2: 95 % (08/27/22 1542)   Vital Signs (24h Range):  Temp:  [97.2 °F (36.2 °C)-98.5 °F (36.9 °C)] 97.8 °F (36.6 °C)  Pulse:  [63-98] 78  Resp:  [17-19] 17  SpO2:  [92 %-97 %] 95 %  BP: (131-156)/(71-81) 145/80     Date 08/27/22 0700 - 08/28/22 0659   Shift 0341-0052 2164-5246 7602-8989 24 Hour Total   INTAKE   P.O. 240   240   Shift Total(mL/kg) 240(4.3)   240(4.3)   OUTPUT   Urine(mL/kg/hr)  400  400   Drains 25   25   Shift Total(mL/kg) 25(0.4) 400(7.1)  425(7.6)   Weight (kg) 56.2 56.2 56.2 56.2                          Closed/Suction Drain 08/25/22 1815 Left;Anterior Neck Accordion 10 Fr. (Active)   Site Description Healing 08/25/22 2240   Drainage Serosanguineous 08/26/22 0921   Status To bulb suction 08/26/22 0921   Output (mL) 10 mL 08/26/22 0921            Closed/Suction Drain 08/25/22 1953 Posterior Back Accordion 10 Fr. (Active)   Site Description Unable to view 08/25/22 2130   Drainage Serosanguineous 08/26/22 0921   Status To bulb suction 08/26/22 0921   Output (mL) 100 mL 08/26/22 0921            Urethral Catheter 08/25/22 1500  16 Fr. (Active)   Site Assessment Clean;Intact;Dry 08/26/22 0810   Collection Container Urimeter 08/26/22 0810   Securement Method secured to top of thigh w/ adhesive device 08/26/22 0810   Catheter Care Performed yes 08/26/22 0810   Reason for Continuing Urinary Catheterization Placed by Urology Service 08/26/22 0810   CAUTI Prevention Bundle Securement Device in place with 1" slack;Intact seal between catheter & drainage tubing;Drainage bag/urimeter off the floor;Sheeting clip in use;No dependent loops or kinks;Drainage bag/urimeter not overfilled (<2/3 full);Drainage bag/urimeter " below bladder 08/26/22 0810   Output (mL) 875 mL 08/25/22 2200       Neurosurgery Physical Exam  General: Well developed, well nourished, not in acute distress.   Head: Normocephalic, atraumatic.  Neck: C-collar in place.  Neurologic: Alert and oriented x 4. Thought content appropriate.  GCS: Motor:6  Verbal:5  Eyes:4   GCS Total: 15  Language: No aphasia.  Speech: No dysarthria.  Cranial nerves: Face symmetric, tongue midline, CN II-XII grossly intact.   Eyes: Pupils equal, round, reactive to light with accomodation, EOMI.   Pulmonary: Normal respirations, no signs of respiratory distress.  Abdomen: Soft, non-distended, non-tender to palpation.  Sensory: Intact to light touch throughout.  Motor Strength: Moves all extremities spontaneously with good tone.      Strength  Deltoids Triceps Biceps Wrist Extension Wrist Flexion Hand    Upper: R 4/5 4+/5 4+5 5/5 5/5 5/5    L 45 4+/5 4+/5 5/5 5/5 5/5     Hip Flexion Knee Extension Knee  Flexion Dorsiflexion Plantar flexion EHL   Lower: R 5/5 5/5 5/5 5/5 5/5 5/5    L 5/5 5/5 5/5 5/5 5/5 5/5     Pain limited exam  Subjective hand numbness bilaterally    DTRs: 2+ and symmetric in UEs and LEs.  Vascular: No LE edema.   Skin: Skin is warm, dry and intact.    Anterior cervical incision with dermabond. No dehiscence, drainage or underlying collection.    Posterior cervical incision with island border, minimal drainage.     HV drains in place.    Significant Labs:  Recent Labs   Lab 08/27/22  1216   *   *   K 3.4*      CO2 25   BUN 13   CREATININE 0.9   CALCIUM 9.1   MG 2.1       Recent Labs   Lab 08/27/22  1216   WBC 10.47   HGB 12.0*   HCT 34.0*          No results for input(s): LABPT, INR, APTT in the last 48 hours.    Microbiology Results (last 7 days)       Procedure Component Value Units Date/Time    AFB Culture & Smear [[id number]] Collected: 08/25/22 1610    Order Status: Completed Specimen: Bone from Neck Updated: 08/26/22 2127     AFB  Culture & Smear Culture in progress     AFB CULTURE STAIN No acid fast bacilli seen.    Narrative:      1. Hardware - culture    Aerobic culture [[id number]] Collected: 08/25/22 1610    Order Status: Completed Specimen: Bone from Neck Updated: 08/26/22 0730     Aerobic Bacterial Culture No growth    Narrative:      1. Hardware - culture    Culture, Anaerobe [[id number]] Collected: 08/25/22 1610    Order Status: Completed Specimen: Bone from Neck Updated: 08/26/22 0650     Anaerobic Culture Culture in progress    Narrative:      1. Hardware - culture    Gram stain [[id number]] Collected: 08/25/22 1610    Order Status: Completed Specimen: Bone from Neck Updated: 08/25/22 1755     Gram Stain Result Rare WBC's      No organisms seen    Narrative:      1. Hardware - culture    Fungus culture [[id number]] Collected: 08/25/22 1610    Order Status: Sent Specimen: Bone from Neck Updated: 08/25/22 1626          All pertinent labs from the last 24 hours have been reviewed.    Significant Diagnostics:  I have reviewed and interpreted all pertinent imaging results/findings within the past 24 hours.  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    - Admit to neurosurgery floor.   -q4h neuro checks  - All pertinent labs and diagnostics personally reviewed.  - Post op x-ray with hardware in good positioning.  - will remove anterior hemovac today. Abx while in place.  - f/u posterior HV, abx while in place  - Pain control with multimodal regimen.   - DVT ppx: TEDs/SCDs/SQH  - Activity: PT/OT; OOB; c-collar when out of bed.  - Diet: soft diet. F/u SLP recs.  - Bowel regimen: senna, miralax daily.  - Urinary: voiding spontaneously. (Urology who was consulted for difficult nazario placement intraop)  - Atelectasis ppx: Encourage IS hourly, up in chair >6 hrs  a day.  - Please contact neurosurgery with any exam changes.    Dispo: pending posterior HV drain removal and medically stable for IPR once drain out            Alanna Garcia MD  Neurosurgery  [facility] ([facility])

## 2022-08-27 NOTE — SUBJECTIVE & OBJECTIVE
Interval History: patient doing well, no focal strength deficits, just some pain limitation. Incision looks good. Anterior neck drain with low output, will remove today. PT recommending IPR    Medications:  Continuous Infusions:      Scheduled Meds:   acetaminophen  650 mg Oral Q6H    amLODIPine  10 mg Oral QAM    ceFAZolin (ANCEF) IVPB  2 g Intravenous Q8H    donepeziL  10 mg Oral QAM    gabapentin  200 mg Oral TID    heparin (porcine)  5,000 Units Subcutaneous Q8H    HYDROmorphone  1 mg Intravenous Once    methocarbamoL  500 mg Oral QID    polyethylene glycol  17 g Oral Daily    potassium, sodium phosphates  2 packet Oral Once    senna  8.6 mg Oral BID    traZODone  50 mg Oral QHS     PRN Meds:HYDROmorphone, melatonin, OLANZapine, oxyCODONE-acetaminophen, oxyCODONE-acetaminophen     Review of Systems  Objective:     Weight: 56.2 kg (124 lb)  Body mass index is 20.63 kg/m².  Vital Signs (Most Recent):  Temp: 97.8 °F (36.6 °C) (08/27/22 1542)  Pulse: 78 (08/27/22 1542)  Resp: 17 (08/27/22 1617)  BP: (!) 145/80 (08/27/22 1542)  SpO2: 95 % (08/27/22 1542)   Vital Signs (24h Range):  Temp:  [97.2 °F (36.2 °C)-98.5 °F (36.9 °C)] 97.8 °F (36.6 °C)  Pulse:  [63-98] 78  Resp:  [17-19] 17  SpO2:  [92 %-97 %] 95 %  BP: (131-156)/(71-81) 145/80     Date 08/27/22 0700 - 08/28/22 0659   Shift 1729-3067 1442-4213 1139-3743 24 Hour Total   INTAKE   P.O. 240   240   Shift Total(mL/kg) 240(4.3)   240(4.3)   OUTPUT   Urine(mL/kg/hr)  400  400   Drains 25   25   Shift Total(mL/kg) 25(0.4) 400(7.1)  425(7.6)   Weight (kg) 56.2 56.2 56.2 56.2                          Closed/Suction Drain 08/25/22 1815 Left;Anterior Neck Accordion 10 Fr. (Active)   Site Description Healing 08/25/22 2240   Drainage Serosanguineous 08/26/22 0921   Status To bulb suction 08/26/22 0921   Output (mL) 10 mL 08/26/22 0921            Closed/Suction Drain 08/25/22 1953 Posterior Back Accordion 10 Fr. (Active)   Site Description Unable to view 08/25/22 2130  "  Drainage Serosanguineous 08/26/22 0921   Status To bulb suction 08/26/22 0921   Output (mL) 100 mL 08/26/22 0921            Urethral Catheter 08/25/22 1500  16 Fr. (Active)   Site Assessment Clean;Intact;Dry 08/26/22 0810   Collection Container Urimeter 08/26/22 0810   Securement Method secured to top of thigh w/ adhesive device 08/26/22 0810   Catheter Care Performed yes 08/26/22 0810   Reason for Continuing Urinary Catheterization Placed by Urology Service 08/26/22 0810   CAUTI Prevention Bundle Securement Device in place with 1" slack;Intact seal between catheter & drainage tubing;Drainage bag/urimeter off the floor;Sheeting clip in use;No dependent loops or kinks;Drainage bag/urimeter not overfilled (<2/3 full);Drainage bag/urimeter below bladder 08/26/22 0810   Output (mL) 875 mL 08/25/22 2200       Neurosurgery Physical Exam  General: Well developed, well nourished, not in acute distress.   Head: Normocephalic, atraumatic.  Neck: C-collar in place.  Neurologic: Alert and oriented x 4. Thought content appropriate.  GCS: Motor:6  Verbal:5  Eyes:4   GCS Total: 15  Language: No aphasia.  Speech: No dysarthria.  Cranial nerves: Face symmetric, tongue midline, CN II-XII grossly intact.   Eyes: Pupils equal, round, reactive to light with accomodation, EOMI.   Pulmonary: Normal respirations, no signs of respiratory distress.  Abdomen: Soft, non-distended, non-tender to palpation.  Sensory: Intact to light touch throughout.  Motor Strength: Moves all extremities spontaneously with good tone.      Strength  Deltoids Triceps Biceps Wrist Extension Wrist Flexion Hand    Upper: R 4/5 4+/5 4+5 5/5 5/5 5/5    L 45 4+/5 4+/5 5/5 5/5 5/5     Hip Flexion Knee Extension Knee  Flexion Dorsiflexion Plantar flexion EHL   Lower: R 5/5 5/5 5/5 5/5 5/5 5/5    L 5/5 5/5 5/5 5/5 5/5 5/5     Pain limited exam  Subjective hand numbness bilaterally    DTRs: 2+ and symmetric in UEs and LEs.  Vascular: No LE edema.   Skin: Skin is " warm, dry and intact.    Anterior cervical incision with dermabond. No dehiscence, drainage or underlying collection.    Posterior cervical incision with island border, minimal drainage.     HV drains in place.    Significant Labs:  Recent Labs   Lab 08/27/22  1216   *   *   K 3.4*      CO2 25   BUN 13   CREATININE 0.9   CALCIUM 9.1   MG 2.1       Recent Labs   Lab 08/27/22  1216   WBC 10.47   HGB 12.0*   HCT 34.0*          No results for input(s): LABPT, INR, APTT in the last 48 hours.    Microbiology Results (last 7 days)       Procedure Component Value Units Date/Time    AFB Culture & Smear [[id number]] Collected: 08/25/22 1610    Order Status: Completed Specimen: Bone from Neck Updated: 08/26/22 2127     AFB Culture & Smear Culture in progress     AFB CULTURE STAIN No acid fast bacilli seen.    Narrative:      1. Hardware - culture    Aerobic culture [[id number]] Collected: 08/25/22 1610    Order Status: Completed Specimen: Bone from Neck Updated: 08/26/22 0730     Aerobic Bacterial Culture No growth    Narrative:      1. Hardware - culture    Culture, Anaerobe [[id number]] Collected: 08/25/22 1610    Order Status: Completed Specimen: Bone from Neck Updated: 08/26/22 0650     Anaerobic Culture Culture in progress    Narrative:      1. Hardware - culture    Gram stain [[id number]] Collected: 08/25/22 1610    Order Status: Completed Specimen: Bone from Neck Updated: 08/25/22 1755     Gram Stain Result Rare WBC's      No organisms seen    Narrative:      1. Hardware - culture    Fungus culture [[id number]] Collected: 08/25/22 1610    Order Status: Sent Specimen: Bone from Neck Updated: 08/25/22 1626          All pertinent labs from the last 24 hours have been reviewed.    Significant Diagnostics:  I have reviewed and interpreted all pertinent imaging results/findings within the past 24 hours.  Physical Exam

## 2022-08-27 NOTE — OP NOTE
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 man status post C5 corpectomy several months ago.  He recently presented to the neurosurgery service with hardware failure.  It was recommended that he return to the operating room for C4-C6 anterior cervical disc fusion.  My services were requested to assist with the approach given the revision nature of this operation.    He was apprised of the risks, benefits and alternatives to surgery.  In spite of the risk inherent to surgery,He provided informed consent for the aforementioned procedures.     PROCEDURE IN DETAIL:  The patient was taken to the operating room and placed on the operating table in the supine position.  General endotracheal anesthesia was induced by the anesthesia team.     The patient was marked, prepped and draped by the Neurosurgery team.  A left-sided cervical incision was marked.    To begin, the incision was carried out with the 10 blade.  Sharp dissection proceeded through the underlying subcutaneous tissue and platysma.  Superiorly and inferiorly-based subplatysmal flaps were elevated to allow for adequate exposure of the cervical spine.      Next, the sternocleidomastoid muscle was encountered.  The  anterior border was skeletonized.  The omohyoid was bluntly dissected away from the underlying structures and divided with the Bovie.  The cervical viscera was retracted medially while the carotid sheath was retracted laterally.  There was noted to be marked scarring between the esophagus and the underlying hardware.  There was a dense capsule surrounding the hardware.  This capsule was incised sharply and elevated off utilizing a Penfield 1 elevator.  Dissection was then performed bluntly proximally and distally on the cervical spine until adequate exposure had been achieved.  Once adequate exposure had been achieved, the patient was handed over to Neurosurgery for their portion of the procedure.    There were no intraoperative complications.  I was present for and participated in the entire procedure as dictated above.     Please note that my services were required for the approach the cervical spine while Dr. Hines's services were required for the spine surgery proper.      ESTIMATED BLOOD LOSS:  Minimal    SPECIMENS:   Specimen (24h ago, onward)      None

## 2022-08-27 NOTE — PLAN OF CARE
Problem: Adult Inpatient Plan of Care  Goal: Plan of Care Review  Outcome: Ongoing, Progressing  Flowsheets (Taken 8/27/2022 1815)  Plan of Care Reviewed With: patient  Goal: Patient-Specific Goal (Individualized)  Outcome: Ongoing, Progressing  Flowsheets (Taken 8/27/2022 1815)  Individualized Care Needs: c-collar in place  Patient-Specific Goals (Include Timeframe): pain management     Problem: Fall Injury Risk  Goal: Absence of Fall and Fall-Related Injury  Outcome: Ongoing, Progressing     POC reviewed with the patient and they verbalized understanding. All comments and concerns addressed. Bed locked in lowest position with bed alarm set, call light within reach. Safety precautions maintained -- fall precautions. C-collar in place. Hemovac x 1 to full suction with minimal output -- 50 ml total this shift. VSS, see flowsheets. See nursing notes for events this shift -- pain managed this shift. Will continue to monitor for changes to POC and clinical condition.

## 2022-08-27 NOTE — ASSESSMENT & PLAN NOTE
Jose Davis is a 77 y.o. 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    - Admit to neurosurgery floor.   -q4h neuro checks  - All pertinent labs and diagnostics personally reviewed.  - Post op x-ray with hardware in good positioning.  - will remove anterior hemovac today. Abx while in place.  - f/u posterior HV, abx while in place  - Pain control with multimodal regimen.   - DVT ppx: TEDs/SCDs/SQH  - Activity: PT/OT; OOB; c-collar when out of bed.  - Diet: soft diet. F/u SLP recs.  - Bowel regimen: senna, miralax daily.  - Urinary: voiding spontaneously. (Urology who was consulted for difficult nazario placement intraop)  - Atelectasis ppx: Encourage IS hourly, up in chair >6 hrs a day.  - Please contact neurosurgery with any exam changes.    Dispo: pending posterior HV drain removal and medically stable for IPR once drain out

## 2022-08-27 NOTE — NURSING
Notified Leia who is answering MD Jose phone about pt with new complaint of numbness and tingling in upper extremities and inability to raise arms past nipple line. Hand  strong. She stated she would notify MD Jose. TM.

## 2022-08-28 PROCEDURE — 11000001 HC ACUTE MED/SURG PRIVATE ROOM

## 2022-08-28 PROCEDURE — 63600175 PHARM REV CODE 636 W HCPCS: Performed by: STUDENT IN AN ORGANIZED HEALTH CARE EDUCATION/TRAINING PROGRAM

## 2022-08-28 PROCEDURE — 25000003 PHARM REV CODE 250

## 2022-08-28 PROCEDURE — 25000003 PHARM REV CODE 250: Performed by: STUDENT IN AN ORGANIZED HEALTH CARE EDUCATION/TRAINING PROGRAM

## 2022-08-28 RX ADMIN — HEPARIN SODIUM 5000 UNITS: 5000 INJECTION INTRAVENOUS; SUBCUTANEOUS at 10:08

## 2022-08-28 RX ADMIN — OXYCODONE AND ACETAMINOPHEN 1 TABLET: 7.5; 325 TABLET ORAL at 09:08

## 2022-08-28 RX ADMIN — GABAPENTIN 200 MG: 100 CAPSULE ORAL at 02:08

## 2022-08-28 RX ADMIN — HEPARIN SODIUM 5000 UNITS: 5000 INJECTION INTRAVENOUS; SUBCUTANEOUS at 06:08

## 2022-08-28 RX ADMIN — METHOCARBAMOL 500 MG: 500 TABLET ORAL at 09:08

## 2022-08-28 RX ADMIN — METHOCARBAMOL 500 MG: 500 TABLET ORAL at 02:08

## 2022-08-28 RX ADMIN — ACETAMINOPHEN 650 MG: 325 TABLET ORAL at 06:08

## 2022-08-28 RX ADMIN — Medication 2 G: at 03:08

## 2022-08-28 RX ADMIN — ACETAMINOPHEN 650 MG: 325 TABLET ORAL at 11:08

## 2022-08-28 RX ADMIN — POLYETHYLENE GLYCOL 3350 17 G: 17 POWDER, FOR SOLUTION ORAL at 08:08

## 2022-08-28 RX ADMIN — GABAPENTIN 200 MG: 100 CAPSULE ORAL at 08:08

## 2022-08-28 RX ADMIN — Medication 2 G: at 11:08

## 2022-08-28 RX ADMIN — OXYCODONE HYDROCHLORIDE AND ACETAMINOPHEN 1 TABLET: 5; 325 TABLET ORAL at 04:08

## 2022-08-28 RX ADMIN — METHOCARBAMOL 500 MG: 500 TABLET ORAL at 08:08

## 2022-08-28 RX ADMIN — Medication 2 G: at 06:08

## 2022-08-28 RX ADMIN — HEPARIN SODIUM 5000 UNITS: 5000 INJECTION INTRAVENOUS; SUBCUTANEOUS at 02:08

## 2022-08-28 RX ADMIN — GABAPENTIN 200 MG: 100 CAPSULE ORAL at 09:08

## 2022-08-28 RX ADMIN — AMLODIPINE BESYLATE 10 MG: 10 TABLET ORAL at 06:08

## 2022-08-28 RX ADMIN — TRAZODONE HYDROCHLORIDE 50 MG: 50 TABLET ORAL at 09:08

## 2022-08-28 RX ADMIN — OXYCODONE AND ACETAMINOPHEN 1 TABLET: 7.5; 325 TABLET ORAL at 06:08

## 2022-08-28 RX ADMIN — SENNOSIDES 8.6 MG: 8.6 TABLET ORAL at 08:08

## 2022-08-28 RX ADMIN — OXYCODONE HYDROCHLORIDE AND ACETAMINOPHEN 1 TABLET: 5; 325 TABLET ORAL at 11:08

## 2022-08-28 RX ADMIN — DONEPEZIL HYDROCHLORIDE 10 MG: 10 TABLET ORAL at 06:08

## 2022-08-28 RX ADMIN — SENNOSIDES 8.6 MG: 8.6 TABLET ORAL at 09:08

## 2022-08-28 RX ADMIN — METHOCARBAMOL 500 MG: 500 TABLET ORAL at 04:08

## 2022-08-28 NOTE — PROGRESS NOTES
[facility] - Neurosurgery ([facility])  Neurosurgery  Progress Note    Subjective:     History of Present Illness: Jose Davis is a 77 y.o. 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 Medina. The patient was recently seen in clinic in f/u and endorsed dysphagia. Denies fever or chills. He is compliant with the cervical collar and bone stimulator. Denies weakness, b/b dysfunction, saddle anesthesia, or gait instability.     CT c spine showed stable placement of the C5 corpectomy cage however, the anterior cervical plate from C4-6 had backed out in addition to the screws going into the C4 and C6 vertebral bodies. He was thus offered an anterior cervical revision of his plate and screws and a posterior decompression/fusion in order to further stabilize his construct and to further decompress his neural elements from the remaining posterior cervical extradural lesion.      Post-Op Info:  Procedure(s) (LRB):  DISCECTOMY, SPINE, CERVICAL, ANTERIOR APPROACH, WITH FUSION C4-6 revision (N/A)  FUSION, SPINE, CERVICAL, POSTERIOR APPROACH C3-T1 Fusion C4-C6  laminectomy (N/A)  CATHETERIZATION, URETHRA  CYSTOSCOPY (N/A)   3 Days Post-Op     Interval History: 8/28: neuro stable. Pending IPR. Posterior drain in place with 80cc SS drainage, will leave today. Anterior neck drain removed.     Medications:  Continuous Infusions:      Scheduled Meds:   acetaminophen  650 mg Oral Q6H    amLODIPine  10 mg Oral QAM    ceFAZolin (ANCEF) IVPB  2 g Intravenous Q8H    donepeziL  10 mg Oral QAM    gabapentin  200 mg Oral TID    heparin (porcine)  5,000 Units Subcutaneous Q8H    HYDROmorphone  1 mg Intravenous Once    methocarbamoL  500 mg Oral QID    polyethylene glycol  17 g Oral Daily    senna  8.6 mg Oral BID    traZODone  50 mg Oral QHS     PRN Meds:HYDROmorphone, melatonin, OLANZapine, oxyCODONE-acetaminophen, oxyCODONE-acetaminophen     Review of Systems  Objective:     Weight: 56.2  "kg (124 lb)  Body mass index is 20.63 kg/m².  Vital Signs (Most Recent):  Temp: 98.8 °F (37.1 °C) (08/28/22 0758)  Pulse: 73 (08/28/22 0758)  Resp: 18 (08/28/22 0758)  BP: 133/76 (08/28/22 0758)  SpO2: 95 % (08/28/22 0758)   Vital Signs (24h Range):  Temp:  [97.5 °F (36.4 °C)-98.8 °F (37.1 °C)] 98.8 °F (37.1 °C)  Pulse:  [69-78] 73  Resp:  [17-19] 18  SpO2:  [92 %-97 %] 95 %  BP: (116-169)/() 133/76     Date 08/28/22 0700 - 08/29/22 0659   Shift 5330-9024 1780-2405 4832-5787 24 Hour Total   INTAKE   P.O. 240   240   Shift Total(mL/kg) 240(4.3)   240(4.3)   OUTPUT   Shift Total(mL/kg)       Weight (kg) 56.2 56.2 56.2 56.2                          Closed/Suction Drain 08/25/22 1815 Left;Anterior Neck Accordion 10 Fr. (Active)   Site Description Healing 08/25/22 2240   Drainage Serosanguineous 08/26/22 0921   Status To bulb suction 08/26/22 0921   Output (mL) 10 mL 08/26/22 0921            Closed/Suction Drain 08/25/22 1953 Posterior Back Accordion 10 Fr. (Active)   Site Description Unable to view 08/25/22 2130   Drainage Serosanguineous 08/26/22 0921   Status To bulb suction 08/26/22 0921   Output (mL) 100 mL 08/26/22 0921            Urethral Catheter 08/25/22 1500  16 Fr. (Active)   Site Assessment Clean;Intact;Dry 08/26/22 0810   Collection Container Urimeter 08/26/22 0810   Securement Method secured to top of thigh w/ adhesive device 08/26/22 0810   Catheter Care Performed yes 08/26/22 0810   Reason for Continuing Urinary Catheterization Placed by Urology Service 08/26/22 0810   CAUTI Prevention Bundle Securement Device in place with 1" slack;Intact seal between catheter & drainage tubing;Drainage bag/urimeter off the floor;Sheeting clip in use;No dependent loops or kinks;Drainage bag/urimeter not overfilled (<2/3 full);Drainage bag/urimeter below bladder 08/26/22 0810   Output (mL) 875 mL 08/25/22 2200       Neurosurgery Physical Exam  General: Well developed, well nourished, not in acute distress.   Head: " Normocephalic, atraumatic.  Neck: C-collar in place.  Neurologic: Alert and oriented x 4. Thought content appropriate.  GCS: Motor:6  Verbal:5  Eyes:4   GCS Total: 15  Language: No aphasia.  Speech: No dysarthria.  Cranial nerves: Face symmetric, tongue midline, CN II-XII grossly intact.   Eyes: Pupils equal, round, reactive to light with accomodation, EOMI.   Pulmonary: Normal respirations, no signs of respiratory distress.  Abdomen: Soft, non-distended, non-tender to palpation.  Sensory: Intact to light touch throughout.  Motor Strength: Moves all extremities spontaneously with good tone.      Strength  Deltoids Triceps Biceps Wrist Extension Wrist Flexion Hand    Upper: R 4/5 4+/5 4+5 5/5 5/5 5/5    L 45 4+/5 4+/5 5/5 5/5 5/5     Hip Flexion Knee Extension Knee  Flexion Dorsiflexion Plantar flexion EHL   Lower: R 5/5 5/5 5/5 5/5 5/5 5/5    L 5/5 5/5 5/5 5/5 5/5 5/5     Pain limited exam  Subjective hand numbness bilaterally    DTRs: 2+ and symmetric in UEs and LEs.  Vascular: No LE edema.   Skin: Skin is warm, dry and intact.    Anterior cervical incision with dermabond. No dehiscence, drainage or underlying collection.    Posterior cervical incision with island border, minimal drainage.     HV drains in place.    Significant Labs:  Recent Labs   Lab 08/27/22  1216   *   *   K 3.4*      CO2 25   BUN 13   CREATININE 0.9   CALCIUM 9.1   MG 2.1       Recent Labs   Lab 08/27/22  1216   WBC 10.47   HGB 12.0*   HCT 34.0*          No results for input(s): LABPT, INR, APTT in the last 48 hours.    Microbiology Results (last 7 days)       Procedure Component Value Units Date/Time    AFB Culture & Smear [[id number]] Collected: 08/25/22 1610    Order Status: Completed Specimen: Bone from Neck Updated: 08/26/22 2127     AFB Culture & Smear Culture in progress     AFB CULTURE STAIN No acid fast bacilli seen.    Narrative:      1. Hardware - culture    Aerobic culture [[id number]] Collected:  08/25/22 1610    Order Status: Completed Specimen: Bone from Neck Updated: 08/26/22 0730     Aerobic Bacterial Culture No growth    Narrative:      1. Hardware - culture    Culture, Anaerobe [[id number]] Collected: 08/25/22 1610    Order Status: Completed Specimen: Bone from Neck Updated: 08/26/22 0650     Anaerobic Culture Culture in progress    Narrative:      1. Hardware - culture    Gram stain [[id number]] Collected: 08/25/22 1610    Order Status: Completed Specimen: Bone from Neck Updated: 08/25/22 1755     Gram Stain Result Rare WBC's      No organisms seen    Narrative:      1. Hardware - culture    Fungus culture [[id number]] Collected: 08/25/22 1610    Order Status: Sent Specimen: Bone from Neck Updated: 08/25/22 1626          All pertinent labs from the last 24 hours have been reviewed.    Significant Diagnostics:  I have reviewed and interpreted all pertinent imaging results/findings within the past 24 hours.  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    - Admit to neurosurgery floor.   -q4h neuro checks  - All pertinent labs and diagnostics personally reviewed.  - Post op x-ray with hardware in good positioning.  - anterior HV removed  - f/u posterior HV, abx while in place  - Pain control with multimodal regimen.   - DVT ppx: TEDs/SCDs/SQH  - Activity: PT/OT; OOB; c-collar when out of bed.  - Diet: soft diet. F/u SLP recs.  - Bowel regimen: senna, miralax daily.  - Urinary: voiding spontaneously. (Urology who was consulted for difficult nazario placement intraop)  - Atelectasis ppx: Encourage IS hourly, up in chair >6 hrs a day.  - Please contact neurosurgery with any exam changes.    Dispo: pending posterior HV drain removal and medically stable for IPR once drain out            Alanna Garcia,  MD  Neurosurgery  [facility] - Neurosurgery ([facility])

## 2022-08-28 NOTE — PLAN OF CARE
..POC and meds reviewed with patient. Questions encouraged and answered. Patient verbalized understanding. V/S and neuro checks stable throughout shift; please see flowsheets for V/S information and full assessment data. NAEO. Siderails up x 3, bed locked in lowest position, call bell in reach, O2 & suction at bedside, C-Collar on , WCTM.

## 2022-08-28 NOTE — ASSESSMENT & PLAN NOTE
Jose Davis is a 77 y.o. 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    - Admit to neurosurgery floor.   -q4h neuro checks  - All pertinent labs and diagnostics personally reviewed.  - Post op x-ray with hardware in good positioning.  - anterior HV removed  - f/u posterior HV, abx while in place  - Pain control with multimodal regimen.   - DVT ppx: TEDs/SCDs/SQH  - Activity: PT/OT; OOB; c-collar when out of bed.  - Diet: soft diet. F/u SLP recs.  - Bowel regimen: senna, miralax daily.  - Urinary: voiding spontaneously. (Urology who was consulted for difficult nazario placement intraop)  - Atelectasis ppx: Encourage IS hourly, up in chair >6 hrs a day.  - Please contact neurosurgery with any exam changes.    Dispo: pending posterior HV drain removal and medically stable for IPR once drain out

## 2022-08-28 NOTE — SUBJECTIVE & OBJECTIVE
Interval History: 8/28: neuro stable. Pending IPR. Posterior drain in place with 80cc SS drainage, will leave today. Anterior neck drain removed.     Medications:  Continuous Infusions:      Scheduled Meds:   acetaminophen  650 mg Oral Q6H    amLODIPine  10 mg Oral QAM    ceFAZolin (ANCEF) IVPB  2 g Intravenous Q8H    donepeziL  10 mg Oral QAM    gabapentin  200 mg Oral TID    heparin (porcine)  5,000 Units Subcutaneous Q8H    HYDROmorphone  1 mg Intravenous Once    methocarbamoL  500 mg Oral QID    polyethylene glycol  17 g Oral Daily    senna  8.6 mg Oral BID    traZODone  50 mg Oral QHS     PRN Meds:HYDROmorphone, melatonin, OLANZapine, oxyCODONE-acetaminophen, oxyCODONE-acetaminophen     Review of Systems  Objective:     Weight: 56.2 kg (124 lb)  Body mass index is 20.63 kg/m².  Vital Signs (Most Recent):  Temp: 98.8 °F (37.1 °C) (08/28/22 0758)  Pulse: 73 (08/28/22 0758)  Resp: 18 (08/28/22 0758)  BP: 133/76 (08/28/22 0758)  SpO2: 95 % (08/28/22 0758)   Vital Signs (24h Range):  Temp:  [97.5 °F (36.4 °C)-98.8 °F (37.1 °C)] 98.8 °F (37.1 °C)  Pulse:  [69-78] 73  Resp:  [17-19] 18  SpO2:  [92 %-97 %] 95 %  BP: (116-169)/() 133/76     Date 08/28/22 0700 - 08/29/22 0659   Shift 0947-2548 9578-1542 2345-0400 24 Hour Total   INTAKE   P.O. 240   240   Shift Total(mL/kg) 240(4.3)   240(4.3)   OUTPUT   Shift Total(mL/kg)       Weight (kg) 56.2 56.2 56.2 56.2                          Closed/Suction Drain 08/25/22 1815 Left;Anterior Neck Accordion 10 Fr. (Active)   Site Description Healing 08/25/22 2240   Drainage Serosanguineous 08/26/22 0921   Status To bulb suction 08/26/22 0921   Output (mL) 10 mL 08/26/22 0921            Closed/Suction Drain 08/25/22 1953 Posterior Back Accordion 10 Fr. (Active)   Site Description Unable to view 08/25/22 2130   Drainage Serosanguineous 08/26/22 0921   Status To bulb suction 08/26/22 0921   Output (mL) 100 mL 08/26/22 0921            Urethral Catheter 08/25/22 1500  16 Fr.  "(Active)   Site Assessment Clean;Intact;Dry 08/26/22 0810   Collection Container Urimeter 08/26/22 0810   Securement Method secured to top of thigh w/ adhesive device 08/26/22 0810   Catheter Care Performed yes 08/26/22 0810   Reason for Continuing Urinary Catheterization Placed by Urology Service 08/26/22 0810   CAUTI Prevention Bundle Securement Device in place with 1" slack;Intact seal between catheter & drainage tubing;Drainage bag/urimeter off the floor;Sheeting clip in use;No dependent loops or kinks;Drainage bag/urimeter not overfilled (<2/3 full);Drainage bag/urimeter below bladder 08/26/22 0810   Output (mL) 875 mL 08/25/22 2200       Neurosurgery Physical Exam  General: Well developed, well nourished, not in acute distress.   Head: Normocephalic, atraumatic.  Neck: C-collar in place.  Neurologic: Alert and oriented x 4. Thought content appropriate.  GCS: Motor:6  Verbal:5  Eyes:4   GCS Total: 15  Language: No aphasia.  Speech: No dysarthria.  Cranial nerves: Face symmetric, tongue midline, CN II-XII grossly intact.   Eyes: Pupils equal, round, reactive to light with accomodation, EOMI.   Pulmonary: Normal respirations, no signs of respiratory distress.  Abdomen: Soft, non-distended, non-tender to palpation.  Sensory: Intact to light touch throughout.  Motor Strength: Moves all extremities spontaneously with good tone.      Strength  Deltoids Triceps Biceps Wrist Extension Wrist Flexion Hand    Upper: R 4/5 4+/5 4+5 5/5 5/5 5/5    L 45 4+/5 4+/5 5/5 5/5 5/5     Hip Flexion Knee Extension Knee  Flexion Dorsiflexion Plantar flexion EHL   Lower: R 5/5 5/5 5/5 5/5 5/5 5/5    L 5/5 5/5 5/5 5/5 5/5 5/5     Pain limited exam  Subjective hand numbness bilaterally    DTRs: 2+ and symmetric in UEs and LEs.  Vascular: No LE edema.   Skin: Skin is warm, dry and intact.    Anterior cervical incision with dermabond. No dehiscence, drainage or underlying collection.    Posterior cervical incision with island border, " minimal drainage.     HV drains in place.    Significant Labs:  Recent Labs   Lab 08/27/22  1216   *   *   K 3.4*      CO2 25   BUN 13   CREATININE 0.9   CALCIUM 9.1   MG 2.1       Recent Labs   Lab 08/27/22  1216   WBC 10.47   HGB 12.0*   HCT 34.0*          No results for input(s): LABPT, INR, APTT in the last 48 hours.    Microbiology Results (last 7 days)       Procedure Component Value Units Date/Time    AFB Culture & Smear [[id number]] Collected: 08/25/22 1610    Order Status: Completed Specimen: Bone from Neck Updated: 08/26/22 2127     AFB Culture & Smear Culture in progress     AFB CULTURE STAIN No acid fast bacilli seen.    Narrative:      1. Hardware - culture    Aerobic culture [[id number]] Collected: 08/25/22 1610    Order Status: Completed Specimen: Bone from Neck Updated: 08/26/22 0730     Aerobic Bacterial Culture No growth    Narrative:      1. Hardware - culture    Culture, Anaerobe [[id number]] Collected: 08/25/22 1610    Order Status: Completed Specimen: Bone from Neck Updated: 08/26/22 0650     Anaerobic Culture Culture in progress    Narrative:      1. Hardware - culture    Gram stain [[id number]] Collected: 08/25/22 1610    Order Status: Completed Specimen: Bone from Neck Updated: 08/25/22 1755     Gram Stain Result Rare WBC's      No organisms seen    Narrative:      1. Hardware - culture    Fungus culture [[id number]] Collected: 08/25/22 1610    Order Status: Sent Specimen: Bone from Neck Updated: 08/25/22 1626          All pertinent labs from the last 24 hours have been reviewed.    Significant Diagnostics:  I have reviewed and interpreted all pertinent imaging results/findings within the past 24 hours.  Physical Exam

## 2022-08-28 NOTE — PLAN OF CARE
Problem: Adult Inpatient Plan of Care  Goal: Plan of Care Review  Outcome: Ongoing, Progressing  Flowsheets (Taken 8/28/2022 1646)  Plan of Care Reviewed With: patient  Goal: Patient-Specific Goal (Individualized)  Outcome: Ongoing, Progressing  Flowsheets (Taken 8/28/2022 1646)  Individualized Care Needs: c-collar in place  Patient-Specific Goals (Include Timeframe): discharge home/rehab tomorrow     Problem: Infection  Goal: Absence of Infection Signs and Symptoms  Outcome: Ongoing, Progressing     Problem: Fall Injury Risk  Goal: Absence of Fall and Fall-Related Injury  Outcome: Ongoing, Progressing     POC reviewed with the patient and they verbalized understanding. All comments and concerns addressed. Bed locked in lowest position with bed alarm set, call light within reach. Safety precautions maintained -- c-collar in place. Hemovac x 1 in place, minimal output. VSS, see flowsheets. No events this shift, pt up in chair majority of shift today. Will continue to monitor for changes to POC and clinical condition.

## 2022-08-29 LAB
ALBUMIN SERPL BCP-MCNC: 3.3 G/DL (ref 3.5–5.2)
ALP SERPL-CCNC: 65 U/L (ref 55–135)
ALT SERPL W/O P-5'-P-CCNC: 6 U/L (ref 10–44)
ANION GAP SERPL CALC-SCNC: 5 MMOL/L (ref 8–16)
AST SERPL-CCNC: 17 U/L (ref 10–40)
BACTERIA SPEC AEROBE CULT: NO GROWTH
BILIRUB SERPL-MCNC: 0.7 MG/DL (ref 0.1–1)
BUN SERPL-MCNC: 15 MG/DL (ref 8–23)
CALCIUM SERPL-MCNC: 9.7 MG/DL (ref 8.7–10.5)
CHLORIDE SERPL-SCNC: 100 MMOL/L (ref 95–110)
CO2 SERPL-SCNC: 29 MMOL/L (ref 23–29)
CREAT SERPL-MCNC: 0.8 MG/DL (ref 0.5–1.4)
EST. GFR  (NO RACE VARIABLE): >60 ML/MIN/1.73 M^2
GLUCOSE SERPL-MCNC: 122 MG/DL (ref 70–110)
POTASSIUM SERPL-SCNC: 3.9 MMOL/L (ref 3.5–5.1)
PROT SERPL-MCNC: 6.6 G/DL (ref 6–8.4)
SODIUM SERPL-SCNC: 134 MMOL/L (ref 136–145)

## 2022-08-29 PROCEDURE — 97530 THERAPEUTIC ACTIVITIES: CPT

## 2022-08-29 PROCEDURE — 25000003 PHARM REV CODE 250: Performed by: STUDENT IN AN ORGANIZED HEALTH CARE EDUCATION/TRAINING PROGRAM

## 2022-08-29 PROCEDURE — 63600175 PHARM REV CODE 636 W HCPCS: Performed by: STUDENT IN AN ORGANIZED HEALTH CARE EDUCATION/TRAINING PROGRAM

## 2022-08-29 PROCEDURE — 97535 SELF CARE MNGMENT TRAINING: CPT

## 2022-08-29 PROCEDURE — 25000003 PHARM REV CODE 250

## 2022-08-29 PROCEDURE — 80053 COMPREHEN METABOLIC PANEL: CPT

## 2022-08-29 PROCEDURE — 99024 POSTOP FOLLOW-UP VISIT: CPT | Mod: ,,,

## 2022-08-29 PROCEDURE — 11000001 HC ACUTE MED/SURG PRIVATE ROOM

## 2022-08-29 PROCEDURE — 97116 GAIT TRAINING THERAPY: CPT

## 2022-08-29 PROCEDURE — 36415 COLL VENOUS BLD VENIPUNCTURE: CPT

## 2022-08-29 PROCEDURE — 99024 PR POST-OP FOLLOW-UP VISIT: ICD-10-PCS | Mod: ,,,

## 2022-08-29 RX ORDER — METHOCARBAMOL 750 MG/1
750 TABLET, FILM COATED ORAL 4 TIMES DAILY
Status: DISCONTINUED | OUTPATIENT
Start: 2022-08-29 | End: 2022-08-30 | Stop reason: HOSPADM

## 2022-08-29 RX ADMIN — GABAPENTIN 200 MG: 100 CAPSULE ORAL at 08:08

## 2022-08-29 RX ADMIN — HEPARIN SODIUM 5000 UNITS: 5000 INJECTION INTRAVENOUS; SUBCUTANEOUS at 02:08

## 2022-08-29 RX ADMIN — TRAZODONE HYDROCHLORIDE 50 MG: 50 TABLET ORAL at 08:08

## 2022-08-29 RX ADMIN — METHOCARBAMOL 750 MG: 750 TABLET ORAL at 02:08

## 2022-08-29 RX ADMIN — METHOCARBAMOL 750 MG: 750 TABLET ORAL at 08:08

## 2022-08-29 RX ADMIN — GABAPENTIN 200 MG: 100 CAPSULE ORAL at 09:08

## 2022-08-29 RX ADMIN — HEPARIN SODIUM 5000 UNITS: 5000 INJECTION INTRAVENOUS; SUBCUTANEOUS at 05:08

## 2022-08-29 RX ADMIN — GABAPENTIN 200 MG: 100 CAPSULE ORAL at 04:08

## 2022-08-29 RX ADMIN — HEPARIN SODIUM 5000 UNITS: 5000 INJECTION INTRAVENOUS; SUBCUTANEOUS at 10:08

## 2022-08-29 RX ADMIN — OXYCODONE AND ACETAMINOPHEN 1 TABLET: 7.5; 325 TABLET ORAL at 10:08

## 2022-08-29 RX ADMIN — AMLODIPINE BESYLATE 10 MG: 10 TABLET ORAL at 06:08

## 2022-08-29 RX ADMIN — OXYCODONE AND ACETAMINOPHEN 1 TABLET: 7.5; 325 TABLET ORAL at 06:08

## 2022-08-29 RX ADMIN — OXYCODONE AND ACETAMINOPHEN 1 TABLET: 7.5; 325 TABLET ORAL at 02:08

## 2022-08-29 RX ADMIN — OXYCODONE AND ACETAMINOPHEN 1 TABLET: 7.5; 325 TABLET ORAL at 09:08

## 2022-08-29 RX ADMIN — OXYCODONE AND ACETAMINOPHEN 1 TABLET: 7.5; 325 TABLET ORAL at 05:08

## 2022-08-29 RX ADMIN — POLYETHYLENE GLYCOL 3350 17 G: 17 POWDER, FOR SOLUTION ORAL at 09:08

## 2022-08-29 RX ADMIN — METHOCARBAMOL 500 MG: 500 TABLET ORAL at 09:08

## 2022-08-29 RX ADMIN — METHOCARBAMOL 750 MG: 750 TABLET ORAL at 04:08

## 2022-08-29 RX ADMIN — Medication 2 G: at 03:08

## 2022-08-29 RX ADMIN — SENNOSIDES 8.6 MG: 8.6 TABLET ORAL at 09:08

## 2022-08-29 RX ADMIN — SENNOSIDES 8.6 MG: 8.6 TABLET ORAL at 08:08

## 2022-08-29 RX ADMIN — DONEPEZIL HYDROCHLORIDE 10 MG: 10 TABLET ORAL at 06:08

## 2022-08-29 NOTE — PLAN OF CARE
..POC and meds reviewed with patient. Questions encouraged and answered. Patient verbalized understanding. V/S and neuro checks stable throughout shift; please see flowsheets for V/S information and full assessment data. NAEO. Siderails up x 3, bed locked in lowest position, call bell in reach, O2 & suction at bedside, WCTM.

## 2022-08-29 NOTE — SUBJECTIVE & OBJECTIVE
Interval History: NAEON. Neuro stable. Posterior drain removed, no issues. Reports significant posterior cervical pain, increased Robaxin to 750 MG TID. Reports continued improvement in swallowing. SLP advanced to mechanical soft diet. Pending rehab.    Medications:  Continuous Infusions:  Scheduled Meds:   acetaminophen  650 mg Oral Q6H    amLODIPine  10 mg Oral QAM    donepeziL  10 mg Oral QAM    gabapentin  200 mg Oral TID    heparin (porcine)  5,000 Units Subcutaneous Q8H    methocarbamoL  750 mg Oral QID    polyethylene glycol  17 g Oral Daily    senna  8.6 mg Oral BID    traZODone  50 mg Oral QHS     PRN Meds:melatonin, OLANZapine, oxyCODONE-acetaminophen, oxyCODONE-acetaminophen     Review of Systems  Objective:     Weight: 56.2 kg (124 lb)  Body mass index is 20.63 kg/m².  Vital Signs (Most Recent):  Temp: 97.8 °F (36.6 °C) (08/29/22 1137)  Pulse: 71 (08/29/22 1137)  Resp: 16 (08/29/22 1137)  BP: 137/71 (08/29/22 1137)  SpO2: 100 % (08/29/22 1137) Vital Signs (24h Range):  Temp:  [96.4 °F (35.8 °C)-98 °F (36.7 °C)] 97.8 °F (36.6 °C)  Pulse:  [63-83] 71  Resp:  [16-18] 16  SpO2:  [96 %-100 %] 100 %  BP: (122-144)/(58-82) 137/71     Date 08/29/22 0700 - 08/30/22 0659   Shift 0397-5099 5279-2964 2594-0521 24 Hour Total   INTAKE   Shift Total(mL/kg)       OUTPUT   Urine(mL/kg/hr) 200   200   Shift Total(mL/kg) 200(3.6)   200(3.6)   Weight (kg) 56.2 56.2 56.2 56.2                 Neurosurgery Physical Exam  General: Well developed, well nourished, not in acute distress.   Head: Normocephalic, atraumatic.  Neck: C-collar in place.  Neurologic: Alert and oriented x 4. Thought content appropriate.  GCS: Motor:6  Verbal:5  Eyes:4   GCS Total: 15  Language: No aphasia.  Speech: No dysarthria.  Cranial nerves: Face symmetric, tongue midline, CN II-XII grossly intact.   Eyes: Pupils equal, round, reactive to light with accomodation, EOMI.   Pulmonary: Normal respirations, no signs of respiratory distress.  Abdomen:  Soft, non-distended, non-tender to palpation.  Sensory: Intact to light touch throughout.  Motor Strength: Moves all extremities spontaneously with good tone.       Strength   Deltoids Triceps Biceps Wrist Extension Wrist Flexion Hand    Upper: R 4/5 4+/5 4+5 5/5 5/5 5/5     L 45 4+/5 4+/5 5/5 5/5 5/5       Hip Flexion Knee Extension Knee  Flexion Dorsiflexion Plantar flexion EHL   Lower: R 5/5 5/5 5/5 5/5 5/5 5/5     L 5/5 5/5 5/5 5/5 5/5 5/5      Pain limited exam  Subjective hand numbness bilaterally     DTRs: 2+ and symmetric in UEs and LEs.  Vascular: No LE edema.   Skin: Skin is warm, dry and intact.     Anterior cervical incision with dermabond. No dehiscence, drainage or underlying collection.     Posterior cervical incision with prineo tape. No dehiscence, drainage or underlying collection.    Significant Labs:  Recent Labs   Lab 08/29/22  1150   *   *   K 3.9      CO2 29   BUN 15   CREATININE 0.8   CALCIUM 9.7     No results for input(s): WBC, HGB, HCT, PLT in the last 48 hours.  No results for input(s): LABPT, INR, APTT in the last 48 hours.  Microbiology Results (last 7 days)       Procedure Component Value Units Date/Time    Aerobic culture [[id number]] Collected: 08/25/22 1610    Order Status: Completed Specimen: Bone from Neck Updated: 08/29/22 0805     Aerobic Bacterial Culture No growth    Narrative:      1. Hardware - culture    AFB Culture & Smear [[id number]] Collected: 08/25/22 1610    Order Status: Completed Specimen: Bone from Neck Updated: 08/26/22 2127     AFB Culture & Smear Culture in progress     AFB CULTURE STAIN No acid fast bacilli seen.    Narrative:      1. Hardware - culture    Culture, Anaerobe [[id number]] Collected: 08/25/22 1610    Order Status: Completed Specimen: Bone from Neck Updated: 08/26/22 0650     Anaerobic Culture Culture in progress    Narrative:      1. Hardware - culture    Gram stain [[id number]] Collected: 08/25/22 1610    Order Status:  Completed Specimen: Bone from Neck Updated: 08/25/22 9321     Gram Stain Result Rare WBC's      No organisms seen    Narrative:      1. Hardware - culture    Fungus culture [[id number]] Collected: 08/25/22 1610    Order Status: Sent Specimen: Bone from Neck Updated: 08/25/22 1626          All pertinent labs from the last 24 hours have been reviewed.    Significant Diagnostics:  I have reviewed and interpreted all pertinent imaging results/findings within the past 24 hours.

## 2022-08-29 NOTE — PT/OT/SLP PROGRESS
Physical Therapy Co-Treatment    Patient Name:  Jose Davis   MRN:  [id number]    Recommendations:     Discharge Recommendations:  rehabilitation facility   Discharge Equipment Recommendations: walker, rolling   Barriers to discharge: Inaccessible home and Decreased caregiver support    Assessment:     Jose Davis is a 77 y.o. 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  He presents with the following impairments/functional limitations:  weakness, impaired endurance, impaired self care skills, impaired functional mobility, gait instability, impaired balance, decreased lower extremity function, decreased coordination, decreased safety awareness, pain. Pt progressing functional mobility, as he was able to ambulate increased distance this date. However, pt continuing to require assist to transfer to stand from standard chair height, and CGA to ensure pt safety throughout amb. Pt also continues to demo gait instability, weakness, and impaired endurance, limiting further independence and progression of gait. Pt would continue to benefit from skilled acute PT in order to address current deficits and progress functional mobility. Continuing to recommend IP rehab upon d/c; however, pt may progress pending improvement during hospital admission.     Rehab Prognosis: Good; patient would benefit from acute skilled PT services to address these deficits and reach maximum level of function.    Recent Surgery: Procedure(s) (LRB):  DISCECTOMY, SPINE, CERVICAL, ANTERIOR APPROACH, WITH FUSION C4-6 revision (N/A)  FUSION, SPINE, CERVICAL, POSTERIOR APPROACH C3-T1 Fusion C4-C6  laminectomy (N/A)  CATHETERIZATION, URETHRA  CYSTOSCOPY (N/A) 4 Days Post-Op    Plan:     During this hospitalization, patient to be seen 4 x/week to address the identified rehab impairments via gait training, therapeutic activities, therapeutic exercises, neuromuscular re-education and progress toward the following  goals:    Plan of Care Expires:  09/26/22    Subjective     Chief Complaint: neck pain at end of session  Patient/Family Comments/goals: Pt agreeable to therapy session; motivated to return home.  Pain/Comfort:  Pain Rating 1:  (reported neck pain, R>L, at end of session; did not rate)  Pain Addressed 1: Reposition, Distraction, Cessation of Activity      Objective:     Communicated with RN prior to session.  Patient found up in chair with nazario catheter, cervical collar upon PT entry to room.     General Precautions: Standard, fall   Orthopedic Precautions:spinal precautions   Braces: Cervical collar  Respiratory Status: Room air     Functional Mobility:  Transfers:     Sit to Stand:  minimum assistance with rolling walker from bedside chair   Cues provided for hand placement and transfer technique with RW   Gait: ~80 ft. x2 with RW and CGA  Mask donned prior to amb in hallway  Chair follow throughout; no seated rest break needed  Demo'd decreased step length, decreased shy, impaired weight-shifting ability, inconsistent foot placement, decreased toe-floor clearance, decreased heel strike, mild instability   Cues provided for self-pacing, safety awareness, upright posture, forward gaze, RW management       AM-PAC 6 CLICK MOBILITY  Turning over in bed (including adjusting bedclothes, sheets and blankets)?: 3  Sitting down on and standing up from a chair with arms (e.g., wheelchair, bedside commode, etc.): 3  Moving from lying on back to sitting on the side of the bed?: 3  Moving to and from a bed to a chair (including a wheelchair)?: 3  Need to walk in hospital room?: 3  Climbing 3-5 steps with a railing?: 2  Basic Mobility Total Score: 17       Therapeutic Activities and Exercises:  Pt educated on role of PT and PT POC. Pt verbalized understanding.   Pt educated on spinal precautions, log-roll technique, and c-collar wear. Pt v/u.   Pt educated on the effects of bed rest and the importance of OOB activity. Pt  encouraged to sit UIC majority of day as tolerated and continue daily ambulation with nursing assist. Pt verbalized understanding.  Pt oriented to call bell and instructed to call for staff assist with standing tasks/transfers. Pt verbalized understanding.   RW delivered for use during hospital admission     Patient left up in chair with all lines intact, call button in reach, and chair alarm on..    GOALS:   Multidisciplinary Problems       Physical Therapy Goals          Problem: Physical Therapy    Goal Priority Disciplines Outcome Goal Variances Interventions   Physical Therapy Goal     PT, PT/OT Ongoing, Progressing     Description: Goals to be met by: 22     Patient will increase functional independence with mobility by performin. Supine to sit with [place]  2. Sit to stand transfer with Modified [place] using RW  3. Gait  x 100 feet with Modified [place] using Rolling Walker.   4. Sitting at edge of bed x5 minutes with [place] while performing LE therex without posterior lean.                         Time Tracking:     PT Received On: 22  PT Start Time: 1016     PT Stop Time: 1046  PT Total Time (min): 30 min     Billable Minutes: Gait Training 25  (co-treatment performed this date due to need for 2 skilled therapists in order to ensure pt safety, accomodate for pt activity tolerance, and maximize functional potential)    Treatment Type: Treatment  PT/PTA: PT     PTA Visit Number: 0     2022

## 2022-08-29 NOTE — PROGRESS NOTES
[facility] - Neurosurgery ([facility])  Neurosurgery  Progress Note    Subjective:     History of Present Illness: Jose Davis is a 77 y.o. 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 Medina. The patient was recently seen in clinic in f/u and endorsed dysphagia. Denies fever or chills. He is compliant with the cervical collar and bone stimulator. Denies weakness, b/b dysfunction, saddle anesthesia, or gait instability.     CT c spine showed stable placement of the C5 corpectomy cage however, the anterior cervical plate from C4-6 had backed out in addition to the screws going into the C4 and C6 vertebral bodies. He was thus offered an anterior cervical revision of his plate and screws and a posterior decompression/fusion in order to further stabilize his construct and to further decompress his neural elements from the remaining posterior cervical extradural lesion.      Post-Op Info:  Procedure(s) (LRB):  DISCECTOMY, SPINE, CERVICAL, ANTERIOR APPROACH, WITH FUSION C4-6 revision (N/A)  FUSION, SPINE, CERVICAL, POSTERIOR APPROACH C3-T1 Fusion C4-C6  laminectomy (N/A)  CATHETERIZATION, URETHRA  CYSTOSCOPY (N/A)   4 Days Post-Op     Interval History: NAEON. Neuro stable. Posterior drain removed, no issues. Reports significant posterior cervical pain, increased Robaxin to 750 MG TID. Reports continued improvement in swallowing. SLP advanced to mechanical soft diet. Pending rehab.    Medications:  Continuous Infusions:  Scheduled Meds:   acetaminophen  650 mg Oral Q6H    amLODIPine  10 mg Oral QAM    donepeziL  10 mg Oral QAM    gabapentin  200 mg Oral TID    heparin (porcine)  5,000 Units Subcutaneous Q8H    methocarbamoL  750 mg Oral QID    polyethylene glycol  17 g Oral Daily    senna  8.6 mg Oral BID    traZODone  50 mg Oral QHS     PRN Meds:melatonin, OLANZapine, oxyCODONE-acetaminophen, oxyCODONE-acetaminophen     Review of Systems  Objective:     Weight: 56.2 kg  (124 lb)  Body mass index is 20.63 kg/m².  Vital Signs (Most Recent):  Temp: 97.8 °F (36.6 °C) (08/29/22 1137)  Pulse: 71 (08/29/22 1137)  Resp: 16 (08/29/22 1137)  BP: 137/71 (08/29/22 1137)  SpO2: 100 % (08/29/22 1137) Vital Signs (24h Range):  Temp:  [96.4 °F (35.8 °C)-98 °F (36.7 °C)] 97.8 °F (36.6 °C)  Pulse:  [63-83] 71  Resp:  [16-18] 16  SpO2:  [96 %-100 %] 100 %  BP: (122-144)/(58-82) 137/71     Date 08/29/22 0700 - 08/30/22 0659   Shift 5874-5275 0657-7209 2586-9481 24 Hour Total   INTAKE   Shift Total(mL/kg)       OUTPUT   Urine(mL/kg/hr) 200   200   Shift Total(mL/kg) 200(3.6)   200(3.6)   Weight (kg) 56.2 56.2 56.2 56.2                 Neurosurgery Physical Exam  General: Well developed, well nourished, not in acute distress.   Head: Normocephalic, atraumatic.  Neck: C-collar in place.  Neurologic: Alert and oriented x 4. Thought content appropriate.  GCS: Motor:6  Verbal:5  Eyes:4   GCS Total: 15  Language: No aphasia.  Speech: No dysarthria.  Cranial nerves: Face symmetric, tongue midline, CN II-XII grossly intact.   Eyes: Pupils equal, round, reactive to light with accomodation, EOMI.   Pulmonary: Normal respirations, no signs of respiratory distress.  Abdomen: Soft, non-distended, non-tender to palpation.  Sensory: Intact to light touch throughout.  Motor Strength: Moves all extremities spontaneously with good tone.       Strength   Deltoids Triceps Biceps Wrist Extension Wrist Flexion Hand    Upper: R 4/5 4+/5 4+5 5/5 5/5 5/5     L 45 4+/5 4+/5 5/5 5/5 5/5       Hip Flexion Knee Extension Knee  Flexion Dorsiflexion Plantar flexion EHL   Lower: R 5/5 5/5 5/5 5/5 5/5 5/5     L 5/5 5/5 5/5 5/5 5/5 5/5      Pain limited exam  Subjective hand numbness bilaterally     DTRs: 2+ and symmetric in UEs and LEs.  Vascular: No LE edema.   Skin: Skin is warm, dry and intact.     Anterior cervical incision with dermabond. No dehiscence, drainage or underlying collection.     Posterior cervical incision with  prineo tape. No dehiscence, drainage or underlying collection.    Significant Labs:  Recent Labs   Lab 08/29/22  1150   *   *   K 3.9      CO2 29   BUN 15   CREATININE 0.8   CALCIUM 9.7     No results for input(s): WBC, HGB, HCT, PLT in the last 48 hours.  No results for input(s): LABPT, INR, APTT in the last 48 hours.  Microbiology Results (last 7 days)       Procedure Component Value Units Date/Time    Aerobic culture [[id number]] Collected: 08/25/22 1610    Order Status: Completed Specimen: Bone from Neck Updated: 08/29/22 0805     Aerobic Bacterial Culture No growth    Narrative:      1. Hardware - culture    AFB Culture & Smear [[id number]] Collected: 08/25/22 1610    Order Status: Completed Specimen: Bone from Neck Updated: 08/26/22 2127     AFB Culture & Smear Culture in progress     AFB CULTURE STAIN No acid fast bacilli seen.    Narrative:      1. Hardware - culture    Culture, Anaerobe [[id number]] Collected: 08/25/22 1610    Order Status: Completed Specimen: Bone from Neck Updated: 08/26/22 0650     Anaerobic Culture Culture in progress    Narrative:      1. Hardware - culture    Gram stain [[id number]] Collected: 08/25/22 1610    Order Status: Completed Specimen: Bone from Neck Updated: 08/25/22 1755     Gram Stain Result Rare WBC's      No organisms seen    Narrative:      1. Hardware - culture    Fungus culture [[id number]] Collected: 08/25/22 1610    Order Status: Sent Specimen: Bone from Neck Updated: 08/25/22 1626          All pertinent labs from the last 24 hours have been reviewed.    Significant Diagnostics:  I have reviewed and interpreted all pertinent imaging results/findings within the past 24 hours.    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    - Admit to neurosurgery floor.   -q4h neuro checks  - All  pertinent labs and diagnostics personally reviewed.  - Post op x-ray with hardware in good positioning.  - HV drains removed.  - Pain control with multimodal regimen. Increasing Robaxin to 750 MG TID for continued pain.  - DVT ppx: TEDs/SCDs/SQH  - Activity: PT/OT; OOB; c-collar when out of bed.  - Diet: mechanical soft diet. F/u SLP recs.  - Bowel regimen: senna, miralax daily.  - Urinary: voiding spontaneously.  - Atelectasis ppx: Encourage IS hourly, up in chair >6 hrs a day.  - Please contact neurosurgery with any exam changes.    Dispo: pending rehab, medically ready             Leti Pretty PA-C  Neurosurgery  [facility] - Neurosurgery ([facility])

## 2022-08-29 NOTE — PLAN OF CARE
Problem: Adult Inpatient Plan of Care  Goal: Plan of Care Review  Outcome: Ongoing, Progressing  Flowsheets (Taken 8/29/2022 1828)  Plan of Care Reviewed With: patient  Goal: Patient-Specific Goal (Individualized)  Outcome: Ongoing, Progressing  Flowsheets (Taken 8/29/2022 1828)  Individualized Care Needs: c-collar in place  Patient-Specific Goals (Include Timeframe): pain controlled this shift     Problem: Infection  Goal: Absence of Infection Signs and Symptoms  Outcome: Ongoing, Progressing     Problem: Fall Injury Risk  Goal: Absence of Fall and Fall-Related Injury  Outcome: Ongoing, Progressing     POC reviewed with the patient and they verbalized understanding. All comments and concerns addressed. Bed locked in lowest position with bed alarm set, call light within reach. Safety precautions maintained -- c-collar in place. Hemovac removed this shift. VSS, see flowsheets. No events this shift, pt up in chair majority of shift today. Walked in hallway with therapy today with walker. Pain managed well this shift. Will continue to monitor for changes to POC and clinical condition.

## 2022-08-29 NOTE — ASSESSMENT & PLAN NOTE
Jose Davis is a 77 y.o. 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    - Admit to neurosurgery floor.   -q4h neuro checks  - All pertinent labs and diagnostics personally reviewed.  - Post op x-ray with hardware in good positioning.  - HV drains removed.  - Pain control with multimodal regimen. Increasing Robaxin to 750 MG TID for continued pain.  - DVT ppx: TEDs/SCDs/SQH  - Activity: PT/OT; OOB; c-collar when out of bed.  - Diet: mechanical soft diet. F/u SLP recs.  - Bowel regimen: senna, miralax daily.  - Urinary: voiding spontaneously.  - Atelectasis ppx: Encourage IS hourly, up in chair >6 hrs a day.  - Please contact neurosurgery with any exam changes.    Dispo: pending rehab, medically ready

## 2022-08-29 NOTE — PLAN OF CARE
[facility] - Neurosurgery (Hospital)  Discharge Reassessment    Primary Care Provider: FRANCISCO Son    Expected Discharge Date: 8/29/2022    Patient rec is Rehab.  Referrals sent via [facility].  Patient pending acceptance and auth.    Reassessment (most recent)       Discharge Reassessment - 08/29/22 1006          Discharge Reassessment    Assessment Type Discharge Planning Reassessment     Did the patient's condition or plan change since previous assessment? Yes     Discharge Plan discussed with: Patient     Communicated LAUREN with patient/caregiver Yes     Discharge Plan A Rehab     Discharge Plan B Home with family;Home Health     DME Needed Upon Discharge  other (see comments)   tbd    Discharge Barriers Identified None     Why the patient remains in the hospital Requires continued medical care        Post-Acute Status    Post-Acute Authorization Placement     Post-Acute Placement Status Referrals Sent     Discharge Delays None known at this time

## 2022-08-29 NOTE — PT/OT/SLP PROGRESS
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  He presents with the following performance deficits affecting function:  weakness, impaired endurance, impaired self care skills, impaired functional mobility, gait instability, impaired balance, orthopedic precautions, pain, decreased lower extremity function.     Pt agreeable to therapy and tolerated the session well. He demonstrated progress this date with the ability to assume the figure-4 position needed for LB dressing while adhering to spinal precautions. Pt then ambulated within the halls with CGA and RW with chair follow for safety. Since demonstrating progress, Pt will now benefit from separate sessions. Pt would benefit from continued skilled acute OT services in order to maximize independence and safety with ADLs and functional mobility to ensure safe return to PLOF in the least restrictive environment. OT recommending Rehab once pt is medically appropriate for d/c.       Rehab Prognosis:  Good; patient would benefit from acute skilled OT services to address these deficits and reach maximum level of function.       Plan:     Patient to be seen 4 x/week to address the above listed problems via self-care/home management,  therapeutic activities, therapeutic exercises, neuromuscular re-education  Plan of Care Expires: 09/25/22  Plan of Care Reviewed with: patient    Subjective     Pain/Comfort:  Pain Rating 1: other (see comments) (not rated)  Location - Side 1: Right  Location - Orientation 1: generalized  Location 1: neck  Pain Addressed 1: Reposition, Distraction, Cessation of Activity  Pain Rating Post-Intervention 1: other (see comments) (not rated)    Objective:     Co-evaluation/treatment performed due to patient's multiple deficits requiring two skilled therapists to appropriately and safely assess patient's strength and endurance while facilitating functional tasks in addition to accommodating for patient's activity tolerance.     Communicated with: RN prior to session.  Patient found up in chair with Condom Catheter, cervical collar upon OT entry to room.    General Precautions: Standard, fall   Orthopedic Precautions:spinal precautions   Braces: Cervical collar  Respiratory Status: Room air     Occupational Performance:     Bed Mobility:    Not assessed: Patient found up in chair and returned to chair at end of session.    Functional Mobility/Transfers:  Patient completed Sit <> Stand Transfer with minimum assistance  with  rolling walker   Functional Mobility: Pt engaging in functional mobility to simulate household/community distances with CGA and utilizing RW in order to maximize functional activity tolerance and standing balance required for engagement in occupations of choice.  Chair follow provided for safety     Activities of Daily Living:  Feeding:  independence : To drink coffee sitting up in the chair   Grooming: independence : To wash face sitting in the chair. Pt with verbalized neck pain with this due to slight cervical movement.   Lower Body Dressing: stand by assistance : to adjust socks sitting in the chair using the figure-4 technique needed to adhere to spinal precautions     [facility] 6 Click ADL:  20    Treatment & Education:  Therapist provided facilitation and instruction of proper body mechanics and fall prevention strategies during tasks listed above.  Instructed patient to sit in bedside chair daily to increase OOB/activity tolerance.  Instructed patient to use call light to have nursing staff assist with needs/transfers.  Discussed OT POC and answered all questions within OT scope of practice.  Whiteboard updated       Patient left up in chair with all lines intact, call button in reach, and chair alarm on    GOALS:   Multidisciplinary Problems       Occupational Therapy Goals          Problem: Occupational Therapy    Goal Priority Disciplines Outcome Interventions   Occupational Therapy Goal     OT, PT/OT Ongoing, Progressing    Description: Goals to be met by: 9/26/2022 (1 month)     Patient will increase functional independence with ADLs by performing:    UE Dressing with Supervision.  LE Dressing with Supervision.  Grooming while standing at sink with Supervision.  Toileting from toilet with Supervision for hygiene and clothing management.   Supine to sit with Supervision.  Step transfer with Supervision  Toilet transfer to toilet with Supervision.                         Time Tracking:     OT Date of Treatment: 08/29/22  OT Start Time: 1016  OT Stop Time: 1045  OT Total Time (min): 29 min    Billable Minutes:Self Care/Home Management 10  Therapeutic Activity 19    OT/KIERA: OT          8/29/2022

## 2022-08-30 ENCOUNTER — TELEPHONE (OUTPATIENT)
Dept: NEUROSURGERY | Facility: CLINIC | Age: 77
End: 2022-08-30
Payer: MEDICARE

## 2022-08-30 VITALS
HEART RATE: 76 BPM | TEMPERATURE: 97 F | BODY MASS INDEX: 20.66 KG/M2 | OXYGEN SATURATION: 91 % | SYSTOLIC BLOOD PRESSURE: 134 MMHG | WEIGHT: 124 LBS | DIASTOLIC BLOOD PRESSURE: 76 MMHG | RESPIRATION RATE: 20 BRPM | HEIGHT: 65 IN

## 2022-08-30 LAB — BACTERIA SPEC ANAEROBE CULT: ABNORMAL

## 2022-08-30 PROCEDURE — 97530 THERAPEUTIC ACTIVITIES: CPT

## 2022-08-30 PROCEDURE — 97116 GAIT TRAINING THERAPY: CPT

## 2022-08-30 PROCEDURE — 99024 PR POST-OP FOLLOW-UP VISIT: ICD-10-PCS | Mod: ,,,

## 2022-08-30 PROCEDURE — 25000003 PHARM REV CODE 250: Performed by: STUDENT IN AN ORGANIZED HEALTH CARE EDUCATION/TRAINING PROGRAM

## 2022-08-30 PROCEDURE — 97535 SELF CARE MNGMENT TRAINING: CPT

## 2022-08-30 PROCEDURE — 99024 POSTOP FOLLOW-UP VISIT: CPT | Mod: ,,,

## 2022-08-30 PROCEDURE — 63600175 PHARM REV CODE 636 W HCPCS: Performed by: STUDENT IN AN ORGANIZED HEALTH CARE EDUCATION/TRAINING PROGRAM

## 2022-08-30 PROCEDURE — 92526 ORAL FUNCTION THERAPY: CPT

## 2022-08-30 PROCEDURE — 25000003 PHARM REV CODE 250

## 2022-08-30 RX ORDER — OXYCODONE AND ACETAMINOPHEN 5; 325 MG/1; MG/1
1 TABLET ORAL EVERY 4 HOURS PRN
Qty: 56 TABLET | Refills: 0
Start: 2022-08-30

## 2022-08-30 RX ORDER — METHOCARBAMOL 750 MG/1
750 TABLET, FILM COATED ORAL 4 TIMES DAILY
Qty: 40 TABLET | Refills: 0
Start: 2022-08-30 | End: 2022-09-09

## 2022-08-30 RX ORDER — LACTULOSE 10 G/15ML
15 SOLUTION ORAL 3 TIMES DAILY
Status: DISCONTINUED | OUTPATIENT
Start: 2022-08-30 | End: 2022-08-30 | Stop reason: HOSPADM

## 2022-08-30 RX ADMIN — METHOCARBAMOL 750 MG: 750 TABLET ORAL at 02:08

## 2022-08-30 RX ADMIN — ACETAMINOPHEN 650 MG: 325 TABLET ORAL at 12:08

## 2022-08-30 RX ADMIN — DONEPEZIL HYDROCHLORIDE 10 MG: 10 TABLET ORAL at 06:08

## 2022-08-30 RX ADMIN — GABAPENTIN 200 MG: 100 CAPSULE ORAL at 09:08

## 2022-08-30 RX ADMIN — POLYETHYLENE GLYCOL 3350 17 G: 17 POWDER, FOR SOLUTION ORAL at 09:08

## 2022-08-30 RX ADMIN — SENNOSIDES 8.6 MG: 8.6 TABLET ORAL at 09:08

## 2022-08-30 RX ADMIN — LACTULOSE 15 G: 20 SOLUTION ORAL at 02:08

## 2022-08-30 RX ADMIN — OXYCODONE AND ACETAMINOPHEN 1 TABLET: 7.5; 325 TABLET ORAL at 09:08

## 2022-08-30 RX ADMIN — METHOCARBAMOL 750 MG: 750 TABLET ORAL at 09:08

## 2022-08-30 RX ADMIN — HEPARIN SODIUM 5000 UNITS: 5000 INJECTION INTRAVENOUS; SUBCUTANEOUS at 06:08

## 2022-08-30 RX ADMIN — AMLODIPINE BESYLATE 10 MG: 10 TABLET ORAL at 06:08

## 2022-08-30 RX ADMIN — HEPARIN SODIUM 5000 UNITS: 5000 INJECTION INTRAVENOUS; SUBCUTANEOUS at 02:08

## 2022-08-30 RX ADMIN — GABAPENTIN 200 MG: 100 CAPSULE ORAL at 02:08

## 2022-08-30 RX ADMIN — ACETAMINOPHEN 650 MG: 325 TABLET ORAL at 06:08

## 2022-08-30 RX ADMIN — OXYCODONE AND ACETAMINOPHEN 1 TABLET: 7.5; 325 TABLET ORAL at 03:08

## 2022-08-30 NOTE — PT/OT/SLP PROGRESS
Physical Therapy Treatment    Patient Name:  Jose Davis   MRN:  [id number]    Recommendations:     Discharge Recommendations:  rehabilitation facility   Discharge Equipment Recommendations: walker, rolling   Barriers to discharge: Inaccessible home and Decreased caregiver support    Assessment:     Jose Davis is a 77 y.o. 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  He presents with the following impairments/functional limitations:  weakness, impaired endurance, impaired self care skills, impaired functional mobility, gait instability, impaired balance, decreased coordination, impaired coordination, decreased upper extremity function, decreased lower extremity function, decreased safety awareness, pain. Pt continuing to progress functional mobility, as he was able to ambulate increased distance this date. However, pt continuing to require assist to complete bed mobility, as well as CGA for transfers and gait 2* instability in standing. Pt also continues to demo gait instability, weakness, and impaired endurance, limiting further independence and progression of gait. Pt would continue to benefit from skilled acute PT in order to address current deficits and progress functional mobility. Continuing to recommend IP rehab upon d/c; however, pt may progress pending improvement during hospital admission.     Rehab Prognosis: Good; patient would benefit from acute skilled PT services to address these deficits and reach maximum level of function.    Recent Surgery: Procedure(s) (LRB):  DISCECTOMY, SPINE, CERVICAL, ANTERIOR APPROACH, WITH FUSION C4-6 revision (N/A)  FUSION, SPINE, CERVICAL, POSTERIOR APPROACH C3-T1 Fusion C4-C6  laminectomy (N/A)  CATHETERIZATION, URETHRA  CYSTOSCOPY (N/A) 5 Days Post-Op    Plan:     During this hospitalization, patient to be seen 4 x/week to address the identified rehab impairments via gait training, therapeutic activities, therapeutic  exercises, neuromuscular re-education and progress toward the following goals:    Plan of Care Expires:  09/26/22    Subjective     Chief Complaint: head/neck pain; feeling cold  Patient/Family Comments/goals: Pt agreeable to therapy session.   Pain/Comfort:  Pain Rating 1:  (reporting head/neck pain, R>L; did not rate)  Pain Addressed 1: Reposition, Distraction, Cessation of Activity, Pre-medicate for activity, Nurse notified      Objective:     Communicated with RN prior to session.  Patient found up in chair with Condom Catheter, cervical collar upon PT entry to room.     General Precautions: Standard, fall, aspiration   Orthopedic Precautions:spinal precautions   Braces: Cervical collar  Respiratory Status: Room air     Functional Mobility:  Bed Mobility:   Supine<>Sit: min assist with cues for log-roll technique   Transfers:     Sit to Stand: contact guard assistance with rolling walker from EOB  Cues provided for hand placement and transfer technique with RW   Gait: ~96 ft. x2 with RW and CGA  Mask donned prior to amb in [facility] Campuso'd decreased step length, decreased shy, impaired weight-shifting ability, inconsistent foot placement, decreased toe-floor clearance, decreased heel strike, mild instability   Cues provided for self-pacing, safety awareness, upright posture, forward gaze, RW management       AM-PAC 6 CLICK MOBILITY  Turning over in bed (including adjusting bedclothes, sheets and blankets)?: 3  Sitting down on and standing up from a chair with arms (e.g., wheelchair, bedside commode, etc.): 3  Moving from lying on back to sitting on the side of the bed?: 3  Moving to and from a bed to a chair (including a wheelchair)?: 3  Need to walk in hospital room?: 3  Climbing 3-5 steps with a railing?: 2  Basic Mobility Total Score: 17       Therapeutic Activities and Exercises:  Pt educated on role of PT and PT POC. Pt verbalized understanding.   Pt educated on spinal precautions, log-roll technique,  and c-collar wear. Pt v/u.   Pt educated on the effects of bed rest and the importance of OOB activity. Pt encouraged to sit UIC majority of day as tolerated and continue daily ambulation with nursing assist. Pt verbalized understanding.  Pt oriented to call bell and instructed to call for staff assist with standing tasks/transfers. Pt verbalized understanding.     Patient left supine with all lines intact, call button in reach, bed alarm on, and RN notified..    GOALS:   Multidisciplinary Problems       Physical Therapy Goals          Problem: Physical Therapy    Goal Priority Disciplines Outcome Goal Variances Interventions   Physical Therapy Goal     PT, PT/OT Ongoing, Progressing     Description: Goals to be met by: 22     Patient will increase functional independence with mobility by performin. Supine to sit with [place]  2. Sit to stand transfer with Modified [place] using RW  3. Gait  x 100 feet with Modified [place] using Rolling Walker.   4. Sitting at edge of bed x5 minutes with [place] while performing LE therex without posterior lean.                         Time Tracking:     PT Received On: 22  PT Start Time: 0942     PT Stop Time: 1012  PT Total Time (min): 30 min     Billable Minutes: Gait Training 20 and Therapeutic Activity 10    Treatment Type: Treatment  PT/PTA: PT     PTA Visit Number: 0     2022

## 2022-08-30 NOTE — PROGRESS NOTES
[facility] - Neurosurgery ([facility])  Neurosurgery  Progress Note    Subjective:     History of Present Illness: Jose Davis is a 77 y.o. 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 Medina. The patient was recently seen in clinic in f/u and endorsed dysphagia. Denies fever or chills. He is compliant with the cervical collar and bone stimulator. Denies weakness, b/b dysfunction, saddle anesthesia, or gait instability.     CT c spine showed stable placement of the C5 corpectomy cage however, the anterior cervical plate from C4-6 had backed out in addition to the screws going into the C4 and C6 vertebral bodies. He was thus offered an anterior cervical revision of his plate and screws and a posterior decompression/fusion in order to further stabilize his construct and to further decompress his neural elements from the remaining posterior cervical extradural lesion.      Post-Op Info:  Procedure(s) (LRB):  DISCECTOMY, SPINE, CERVICAL, ANTERIOR APPROACH, WITH FUSION C4-6 revision (N/A)  FUSION, SPINE, CERVICAL, POSTERIOR APPROACH C3-T1 Fusion C4-C6  laminectomy (N/A)  CATHETERIZATION, URETHRA  CYSTOSCOPY (N/A)   5 Days Post-Op     Interval History: NAEON. Neuro stable. Patient reports significant posterior shoulder/neck pain attributing it to sleeping in his c-collar. Advised him to only wear it out of bed. Pending rehab.    Medications:  Continuous Infusions:  Scheduled Meds:   acetaminophen  650 mg Oral Q6H    amLODIPine  10 mg Oral QAM    donepeziL  10 mg Oral QAM    gabapentin  200 mg Oral TID    heparin (porcine)  5,000 Units Subcutaneous Q8H    methocarbamoL  750 mg Oral QID    polyethylene glycol  17 g Oral Daily    senna  8.6 mg Oral BID    traZODone  50 mg Oral QHS     PRN Meds:melatonin, OLANZapine, oxyCODONE-acetaminophen, oxyCODONE-acetaminophen     Review of Systems  Objective:     Weight: 56.2 kg (124 lb)  Body mass index is 20.63 kg/m².  Vital Signs  (Most Recent):  Temp: 97.3 °F (36.3 °C) (08/30/22 0758)  Pulse: 73 (08/30/22 0758)  Resp: 18 (08/30/22 0944)  BP: 110/71 (08/30/22 0758)  SpO2: 97 % (08/30/22 0758) Vital Signs (24h Range):  Temp:  [97.3 °F (36.3 °C)-99.1 °F (37.3 °C)] 97.3 °F (36.3 °C)  Pulse:  [59-83] 73  Resp:  [15-19] 18  SpO2:  [93 %-100 %] 97 %  BP: (110-163)/(71-79) 110/71       Neurosurgery Physical Exam  General: Well developed, well nourished, not in acute distress.   Head: Normocephalic, atraumatic.  Neck: C-collar in place.  Neurologic: Alert and oriented x 4. Thought content appropriate.  GCS: Motor:6  Verbal:5  Eyes:4   GCS Total: 15  Language: No aphasia.  Speech: No dysarthria.  Cranial nerves: Face symmetric, tongue midline, CN II-XII grossly intact.   Eyes: Pupils equal, round, reactive to light with accomodation, EOMI.   Pulmonary: Normal respirations, no signs of respiratory distress.  Abdomen: Soft, non-distended, non-tender to palpation.  Sensory: Intact to light touch throughout.  Motor Strength: Moves all extremities spontaneously with good tone.       Strength   Deltoids Triceps Biceps Wrist Extension Wrist Flexion Hand    Upper: R 4/5 5/5 5/5 5/5 5/5 5/5     L 45 5/5 5/5 5/5 5/5 5/5       Hip Flexion Knee Extension Knee  Flexion Dorsiflexion Plantar flexion EHL   Lower: R 5/5 5/5 5/5 5/5 5/5 5/5     L 5/5 5/5 5/5 5/5 5/5 5/5      Pain limited exam  Subjective hand numbness bilaterally     DTRs: 2+ and symmetric in UEs and LEs.  Vascular: No LE edema.   Skin: Skin is warm, dry and intact.     Anterior cervical incision with dermabond. No dehiscence, drainage or underlying collection.     Posterior cervical incision with prineo tape. No dehiscence, drainage or underlying collection.    Significant Labs:  Recent Labs   Lab 08/29/22  1150   *   *   K 3.9      CO2 29   BUN 15   CREATININE 0.8   CALCIUM 9.7     No results for input(s): WBC, HGB, HCT, PLT in the last 48 hours.  No results for input(s):  LABPT, INR, APTT in the last 48 hours.  Microbiology Results (last 7 days)       Procedure Component Value Units Date/Time    Aerobic culture [[id number]] Collected: 08/25/22 1610    Order Status: Completed Specimen: Bone from Neck Updated: 08/29/22 0805     Aerobic Bacterial Culture No growth    Narrative:      1. Hardware - culture    AFB Culture & Smear [[id number]] Collected: 08/25/22 1610    Order Status: Completed Specimen: Bone from Neck Updated: 08/26/22 2127     AFB Culture & Smear Culture in progress     AFB CULTURE STAIN No acid fast bacilli seen.    Narrative:      1. Hardware - culture    Culture, Anaerobe [[id number]] Collected: 08/25/22 1610    Order Status: Completed Specimen: Bone from Neck Updated: 08/26/22 0650     Anaerobic Culture Culture in progress    Narrative:      1. Hardware - culture    Gram stain [[id number]] Collected: 08/25/22 1610    Order Status: Completed Specimen: Bone from Neck Updated: 08/25/22 1755     Gram Stain Result Rare WBC's      No organisms seen    Narrative:      1. Hardware - culture    Fungus culture [[id number]] Collected: 08/25/22 1610    Order Status: Sent Specimen: Bone from Neck Updated: 08/25/22 1626          All pertinent labs from the last 24 hours have been reviewed.    Significant Diagnostics:  I have reviewed and interpreted all pertinent imaging results/findings within the past 24 hours.    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    - Admit to neurosurgery floor.   -q4h neuro checks  - All pertinent labs and diagnostics personally reviewed.  - Post op x-ray with hardware in good positioning.  - HV drains removed.  - Pain control with multimodal regimen. Increased Robaxin to 750 MG TID for continued pain.  - DVT ppx: TEDs/SCDs/SQH  - Activity: PT/OT; OOB; c-collar when out of bed.  - Diet:  mechanical soft diet. F/u SLP recs.  - Bowel regimen: senna, miralax daily.  - Urinary: voiding spontaneously.  - Atelectasis ppx: Encourage IS hourly, up in chair >6 hrs a day.  - Please contact neurosurgery with any exam changes.    Dispo: pending rehab, medically ready             Leti Pretty PA-C  Neurosurgery  [facility] - Neurosurgery ([facility])

## 2022-08-30 NOTE — NURSING
15:45 to 15:51 call report to AWILDA Richardson at [facility]; patient discharged to Rehab in  with [facility] transportation at 15:08 and belongings with patient.

## 2022-08-30 NOTE — PLAN OF CARE
Patient accepted by [facility].  Auth submitted for.   08/30/22 0948   Post-Acute Status   Post-Acute Authorization Placement   Post-Acute Placement Status Pending payor review/awaiting authorization (if required)

## 2022-08-30 NOTE — PT/OT/SLP PROGRESS
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 Like the stuff I am getting" per pt  Patient goals: home     Pain/Comfort:  Pain Rating 1: 0/10  Pain Rating Post-Intervention 1: 0/10    Respiratory Status: Room air    Objective:     Has the patient been evaluated by SLP for swallowing?   Yes  Keep patient NPO? No   Current Respiratory Status:        Pt. Seen at bedside and was observed swallowing approx 3 ounces of water and 1/2 cracker.  No verbal cues needed for pt. To monitor sip and bite sized.  Pt. Verbalized good recall of all safe swallow strategies and aspiration precautions with no cues needed.  Pt. Expressed desire to remain on [facility] soft diet which is similar to what he was doing at home.  Aspiration precautions and safe swallow strategies reinforced.   Assessment:     Jose Davis is a 77 y.o. male with no s/s of airway compromise  Goals:   Multidisciplinary Problems       SLP Goals       Not on file              Multidisciplinary Problems (Resolved)          Problem: SLP    Goal Priority Disciplines Outcome   SLP Goal   (Resolved)     SLP Met   Description: Goals expected to be met by 9/2:   1. Pt will participate in ongoing assessment of swallow function to determine least restrictive diet.                        Plan:     Patient to be seen:  3 x/week   Plan of Care expires:  09/24/22  Plan of Care reviewed with:  patient   SLP Follow-Up:  No       Discharge " recommendations:  home   Barriers to Discharge:  None    Time Tracking:     SLP Treatment Date:   08/30/22  Speech Start Time:  0730  Speech Stop Time:  0746     Speech Total Time (min):  16 min    Billable Minutes: Treatment Swallowing Dysfunction 8 and Self Care/Home Management Training 8    08/30/2022

## 2022-08-30 NOTE — SUBJECTIVE & OBJECTIVE
Interval History: NAEON. Neuro stable. Patient reports significant posterior shoulder/neck pain attributing it to sleeping in his c-collar. Advised him to only wear it out of bed. Pending rehab.    Medications:  Continuous Infusions:  Scheduled Meds:   acetaminophen  650 mg Oral Q6H    amLODIPine  10 mg Oral QAM    donepeziL  10 mg Oral QAM    gabapentin  200 mg Oral TID    heparin (porcine)  5,000 Units Subcutaneous Q8H    methocarbamoL  750 mg Oral QID    polyethylene glycol  17 g Oral Daily    senna  8.6 mg Oral BID    traZODone  50 mg Oral QHS     PRN Meds:melatonin, OLANZapine, oxyCODONE-acetaminophen, oxyCODONE-acetaminophen     Review of Systems  Objective:     Weight: 56.2 kg (124 lb)  Body mass index is 20.63 kg/m².  Vital Signs (Most Recent):  Temp: 97.3 °F (36.3 °C) (08/30/22 0758)  Pulse: 73 (08/30/22 0758)  Resp: 18 (08/30/22 0944)  BP: 110/71 (08/30/22 0758)  SpO2: 97 % (08/30/22 0758) Vital Signs (24h Range):  Temp:  [97.3 °F (36.3 °C)-99.1 °F (37.3 °C)] 97.3 °F (36.3 °C)  Pulse:  [59-83] 73  Resp:  [15-19] 18  SpO2:  [93 %-100 %] 97 %  BP: (110-163)/(71-79) 110/71       Neurosurgery Physical Exam  General: Well developed, well nourished, not in acute distress.   Head: Normocephalic, atraumatic.  Neck: C-collar in place.  Neurologic: Alert and oriented x 4. Thought content appropriate.  GCS: Motor:6  Verbal:5  Eyes:4   GCS Total: 15  Language: No aphasia.  Speech: No dysarthria.  Cranial nerves: Face symmetric, tongue midline, CN II-XII grossly intact.   Eyes: Pupils equal, round, reactive to light with accomodation, EOMI.   Pulmonary: Normal respirations, no signs of respiratory distress.  Abdomen: Soft, non-distended, non-tender to palpation.  Sensory: Intact to light touch throughout.  Motor Strength: Moves all extremities spontaneously with good tone.       Strength   Deltoids Triceps Biceps Wrist Extension Wrist Flexion Hand    Upper: R 4/5 5/5 5/5 5/5 5/5 5/5     L 45 5/5 5/5 5/5 5/5 5/5        Hip Flexion Knee Extension Knee  Flexion Dorsiflexion Plantar flexion EHL   Lower: R 5/5 5/5 5/5 5/5 5/5 5/5     L 5/5 5/5 5/5 5/5 5/5 5/5      Pain limited exam  Subjective hand numbness bilaterally     DTRs: 2+ and symmetric in UEs and LEs.  Vascular: No LE edema.   Skin: Skin is warm, dry and intact.     Anterior cervical incision with dermabond. No dehiscence, drainage or underlying collection.     Posterior cervical incision with prineo tape. No dehiscence, drainage or underlying collection.    Significant Labs:  Recent Labs   Lab 08/29/22  1150   *   *   K 3.9      CO2 29   BUN 15   CREATININE 0.8   CALCIUM 9.7     No results for input(s): WBC, HGB, HCT, PLT in the last 48 hours.  No results for input(s): LABPT, INR, APTT in the last 48 hours.  Microbiology Results (last 7 days)       Procedure Component Value Units Date/Time    Aerobic culture [[id number]] Collected: 08/25/22 1610    Order Status: Completed Specimen: Bone from Neck Updated: 08/29/22 0805     Aerobic Bacterial Culture No growth    Narrative:      1. Hardware - culture    AFB Culture & Smear [[id number]] Collected: 08/25/22 1610    Order Status: Completed Specimen: Bone from Neck Updated: 08/26/22 2127     AFB Culture & Smear Culture in progress     AFB CULTURE STAIN No acid fast bacilli seen.    Narrative:      1. Hardware - culture    Culture, Anaerobe [[id number]] Collected: 08/25/22 1610    Order Status: Completed Specimen: Bone from Neck Updated: 08/26/22 0650     Anaerobic Culture Culture in progress    Narrative:      1. Hardware - culture    Gram stain [[id number]] Collected: 08/25/22 1610    Order Status: Completed Specimen: Bone from Neck Updated: 08/25/22 1755     Gram Stain Result Rare WBC's      No organisms seen    Narrative:      1. Hardware - culture    Fungus culture [[id number]] Collected: 08/25/22 1610    Order Status: Sent Specimen: Bone from Neck Updated: 08/25/22 1626          All pertinent labs from  the last 24 hours have been reviewed.    Significant Diagnostics:  I have reviewed and interpreted all pertinent imaging results/findings within the past 24 hours.

## 2022-08-30 NOTE — TELEPHONE ENCOUNTER
----- Message from Leti Pretty PA-C sent at 8/30/2022  1:27 PM CDT -----  Hi this patient needs a 2 week wound check and a 6 week postop with new Xray. Surgery was 8/25.    Thank you!

## 2022-08-30 NOTE — ASSESSMENT & PLAN NOTE
Jose Davis is a 77 y.o. 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    - Admit to neurosurgery floor.   -q4h neuro checks  - All pertinent labs and diagnostics personally reviewed.  - Post op x-ray with hardware in good positioning.  - HV drains removed.  - Pain control with multimodal regimen. Increased Robaxin to 750 MG TID for continued pain.  - DVT ppx: TEDs/SCDs/SQH  - Activity: PT/OT; OOB; c-collar when out of bed.  - Diet: mechanical soft diet. F/u SLP recs.  - Bowel regimen: senna, miralax daily.  - Urinary: voiding spontaneously.  - Atelectasis ppx: Encourage IS hourly, up in chair >6 hrs a day.  - Please contact neurosurgery with any exam changes.    Dispo: pending rehab, medically ready

## 2022-08-30 NOTE — PLAN OF CARE
Problem: SLP  Goal: SLP Goal  Description: Goals expected to be met by 9/2:   1. Pt will participate in ongoing assessment of swallow function to determine least restrictive diet.   Outcome: Met

## 2022-08-30 NOTE — DISCHARGE INSTRUCTIONS
Post op Spine Patient Instructions    Activity Restrictions:  [x]  Return to work will be determined on an individual basis.  [x]  No lifting greater than 5-10 pounds.  [x]  Avoid bending and twisting the area of your surgery more than 45 degrees from neutral position in any direction.  [x]  No driving or operating machinery:  [x]  until cleared by your surgeon.  [x]  while taking narcotic pain medications or muscle relaxants.  [x]  Wear your c-collar at all times except when lying in bed, showering, using the restroom, or performing hygiene tasks until cleared by surgeon.   [x]  Increase ambulation over the next 2 weeks so that you are walking 2 miles per day at 2 weeks post-operatively.  [x]  Walk on paved surfaces only. It is okay to walk up and down stairs while holding onto a side rail.  [x]  No sexual activity for 6 weeks.    Discharge Medication/Follow-up:  [x]  Please refer to discharge medication reconciliation form.  [x]  Do not take any OTC products containing acetaminophen (tylenol) at the same time as you take your narcotic pain medication. Medications that may contain acetaminophen include but are not limited to: Excedrin and other headache medications, arthritis medications, cold and sinus medications, etc. Please review the list of active ingredients in any OTC medication prior to taking it.  [x]  Do not take ANY Aspirin or Aspirin containing products for 2 weeks after surgery.   [x]  Do not take ANY herbal supplements for 2 weeks after surgery.    [x]  Do not take ANY non-steroidal anti-inflammatory drugs (NSAIDS), including the following: ibuprofen, naprosyn, Aleve, Advil, Indocin, Mobic, or Celebrex for 12 weeks after surgery.   [x]  Prescriptions for appropriate medication will be given upon discharge.  [x]  Take docusate (Colace 100 mg): take one capsule a day as needed for constipation. You can get this over the counter.  [x]  Follow-up appointment:  [x]  In 10-14 days post-op for wound check  by physician assistant/nurse.  [x]  In 4-6 weeks with MD:  [x]  with x-rays.  [x]  Appointments will be arranged for you and you will be contacted with a date and time.    Wound Care:  [x]  No bandage required. Keep your incision open to the air. You have dermabond/Prineo (skin glue) covering your incision. This will begin to flake off over the next 2 weeks. Do not remove this on your own, allow it to peel off. Do not apply ointments or creams to your incision.  [x]  You may shower on the 2nd day after your surgery. Keep the incision clean and dry at all times. Do not allow the force of water to hit the incision. If the incision gets damp, pat it dry. Do not rub or scrub the incision.  [x]  You cannot take a bath until 8 weeks after surgery.      Call your doctor or go to the Emergency Room for any signs of infection, including: increased redness, drainage, pain, or fever (temperature ?101.5 for 24 hours). Call your doctor or go to the Emergency Room if there are any localized neurological changes; problems with speech, vision, numbness, tingling, weakness, or severe headache; inability to control urination or bowel movements; inability to urinate; or for other concerns.    Special Instructions:  [x]  No use of tobacco products.  [x]  Diet: Please eat your regular diet as tolerated.      Physicians need 3 days notice for pain medicine to be refilled. Pain medicine will only be refilled between 8 AM and 5 PM, Monday through Friday, due to Food and Drug Administration regulation of documentation.    If you have any questions about this form, please call [phone].    Form No. 78463 (Revised 10/31/2013)

## 2022-08-30 NOTE — PLAN OF CARE
[facility]    FACILITY TRANSFER ORDERS      Patient Name: Jose Davis  YOB: 1945    PCP: FRANCISCO Son   PCP Address: [address] [place] [address] / [facility]*  PCP Phone Number: [phone]  PCP Fax: [phone]    Encounter Date: 08/30/2022    Admit to: rehab    Vital Signs:  Routine    Diagnoses:   Active Hospital Problems    Diagnosis  POA    *Hardware failure of anterior column of spine [T84.216A]  Yes      Resolved Hospital Problems   No resolved problems to display.       Allergies:Review of patient's allergies indicates:  No Known Allergies    Diet: regular diet    Activities: Activity as tolerated    Goals of Care Treatment Preferences:  Code Status: Full Code      Nursing: fall precautions; c-collar when out of bed only     Labs: CBC and BMP  per facility protocol      CONSULTS:    Physical Therapy to evaluate and treat. , Occupational Therapy to evaluate and treat., and Speech Therapy to evaluate and treat for Swallowing.    MISCELLANEOUS CARE:  none    WOUND CARE ORDERS  Yes: Surgical Wound:  Location: Posterior cervical incision with dissolvable sutures, dermabond and prineo tape.     Keep open to air. Do not apply ointments, creams, lotions. Allow glue/tape to fall of on its own. Avoid touching it. Do not allow incision to get wet. Keep dry.    Medications: Review discharge medications with patient and family and provide education.      Current Discharge Medication List        CONTINUE these medications which have CHANGED    Details   methocarbamoL (ROBAXIN) 750 MG Tab Take 1 tablet (750 mg total) by mouth 4 (four) times daily. for 10 days  Qty: 40 tablet, Refills: 0      oxyCODONE-acetaminophen (PERCOCET) 5-325 mg per tablet Take 1 tablet by mouth every 4 (four) hours as needed for Pain.  Qty: 56 tablet, Refills: 0    Comments: Quantity prescribed more than 7 day supply? Yes, quantity medically necessary           CONTINUE these medications  which have NOT CHANGED    Details   amLODIPine (NORVASC) 10 MG tablet Take 10 mg by mouth every morning.      aspirin (ECOTRIN) 81 MG EC tablet Take 81 mg by mouth once daily.      donepeziL (ARICEPT) 10 MG tablet Take 10 mg by mouth every morning.      gabapentin (NEURONTIN) 100 MG capsule Take 2 capsules (200 mg total) by mouth 3 (three) times daily.  Qty: 180 capsule, Refills: 0      rosuvastatin (CRESTOR) 20 MG tablet Take 20 mg by mouth every evening.      traZODone (DESYREL) 50 MG tablet Take 50 mg by mouth every evening.      hydrocortisone 2.5 % cream APPLY TO AFFECTED AREA TWICE A DAY  Qty: 28.35 g, Refills: 3      hydrocortisone-pramoxine (ANALPRAM-HC) 2.5-1 % Crea INSERT BY RECTUM 3 TIMES A DAY AS NEEDED HEMORRHOIDS                Immunizations Administered as of 8/30/2022       No immunizations on file.            This patient has had both covid vaccinations    Some patients may experience side effects after vaccination.  These may include fever, headache, muscle or joint aches.  Most symptoms resolve with 24-48 hours and do not require urgent medical evaluation unless they persist for more than 72 hours or symptoms are concerning for an unrelated medical condition.          _________________________________  Leti Pretty PA-C  08/30/2022

## 2022-08-30 NOTE — PLAN OF CARE
[facility] ([facility])  Discharge Final Note    Primary Care Provider: FRANCISCO Son    Expected Discharge Date: 8/30/2022    Patient to be discharged to [facility].  Care deferred to [facility].  PFC to provdie transportation.  Neurosurgery clinic to schedule follow up appointment.    Nurse to call report to [phone].  Wheelchair requested for 2:45 which is not a guaranteed arrival time.        Final Discharge Note (most recent)       Final Note - 08/30/22 1348          Final Note    Assessment Type Final Discharge Note     Anticipated Discharge Disposition Rehab Facility        Post-Acute Status    Post-Acute Authorization Placement     Post-Acute Placement Status Set-up Complete/Auth obtained                     Important Message from Medicare

## 2022-08-31 NOTE — DISCHARGE SUMMARY
[facility] ([facility])  Neurosurgery  Discharge Summary      Patient Name: Jose Davis  MRN: [id number]  Admission Date: 8/25/2022  Hospital Length of Stay: 5 days  Discharge Date and Time: 8/30/2022  3:02 PM  Attending Physician: No att. providers found   Discharging Provider: Leti Pretty PA-C  Primary Care Provider: FRANCISCO Son    HPI:   Jose Davis is a 77 y.o. 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 Medina. The patient was recently seen in clinic in f/u and endorsed dysphagia. Denies fever or chills. He is compliant with the cervical collar and bone stimulator. Denies weakness, b/b dysfunction, saddle anesthesia, or gait instability.     CT c spine showed stable placement of the C5 corpectomy cage however, the anterior cervical plate from C4-6 had backed out in addition to the screws going into the C4 and C6 vertebral bodies. He was thus offered an anterior cervical revision of his plate and screws and a posterior decompression/fusion in order to further stabilize his construct and to further decompress his neural elements from the remaining posterior cervical extradural lesion.      Procedure(s) (LRB):  DISCECTOMY, SPINE, CERVICAL, ANTERIOR APPROACH, WITH FUSION C4-6 revision (N/A)  FUSION, SPINE, CERVICAL, POSTERIOR APPROACH C3-T1 Fusion C4-C6  laminectomy (N/A)  CATHETERIZATION, URETHRA  CYSTOSCOPY (N/A)     Hospital Course: 8/26: POD 1. Patient with some agitation and disorientation overnight. Appears to have resolved this am. Patient is in no distress, Aox4. Worked well with therapy this am out of bed. States his pain is well controlled. Has not noticed much improvement with his swallowing but no worsening symptoms. R deltoid weakness but attributes this to pain. Denies numbness. Rosario in place, will remove today and f/u voiding.  8/27: patient doing well, no focal strength deficits, just some pain limitation. Incision looks good.  Anterior neck drain with low output, will remove today. PT recommending IPR  8/28: neuro stable. Pending IPR. Posterior drain in place with 80cc SS drainage, will leave today. Anterior neck drain removed.   8/29: KEON. Neuro stable. Posterior drain removed, no issues. Reports significant posterior cervical pain, increased Robaxin to 750 MG TID. Reports continued improvement in swallowing. SLP advanced to mechanical soft diet. Pending rehab.  8/30: KEON. Neuro stable. Patient reports significant posterior shoulder/neck pain attributing it to sleeping in his c-collar. Advised him to only wear it out of bed. Pending rehab.      Goals of Care Treatment Preferences:  Code Status: Full Code      Consults:     Significant Diagnostic Studies: Labs:   CMP   Recent Labs   Lab 08/29/22  1150   *   K 3.9      CO2 29   *   BUN 15   CREATININE 0.8   CALCIUM 9.7   PROT 6.6   ALBUMIN 3.3*   BILITOT 0.7   ALKPHOS 65   AST 17   ALT 6*   ANIONGAP 5*   , CBC No results for input(s): WBC, HGB, HCT, PLT in the last 48 hours. and All labs within the past 24 hours have been reviewed  Radiology: X-Ray: cervical spine    Pending Diagnostic Studies:     Procedure Component Value Units Date/Time    Specimen to Pathology, Surgery Neurosurgery [[id number]] Collected: 08/25/22 1855    Order Status: Sent Lab Status: In process Updated: 08/26/22 0716    Specimen: Tissue         Final Active Diagnoses:    Diagnosis Date Noted POA    PRINCIPAL PROBLEM:  Hardware failure of anterior column of spine [T84.216A] 08/26/2022 Yes      Problems Resolved During this Admission:      Discharged Condition: good     Disposition: Rehab Facility    Follow Up:    Patient Instructions:      X-Ray Cervical Spine AP And Lateral   Standing Status: Future Standing Exp. Date: 08/30/23     Order Specific Question Answer Comments   May the Radiologist modify the order per protocol to meet the clinical needs of the patient? Yes    Release to patient  Immediate      Notify your health care provider if you experience any of the following:  increased confusion or weakness     Notify your health care provider if you experience any of the following:  persistent dizziness, light-headedness, or visual disturbances     Notify your health care provider if you experience any of the following:  worsening rash     Notify your health care provider if you experience any of the following:  severe persistent headache     Notify your health care provider if you experience any of the following:  difficulty breathing or increased cough     Notify your health care provider if you experience any of the following:  redness, tenderness, or signs of infection (pain, swelling, redness, odor or green/yellow discharge around incision site)     Notify your health care provider if you experience any of the following:  severe uncontrolled pain     Notify your health care provider if you experience any of the following:  persistent nausea and vomiting or diarrhea     Notify your health care provider if you experience any of the following:  temperature >100.4     Activity as tolerated     Medications:  Reconciled Home Medications:      Medication List      CHANGE how you take these medications    methocarbamoL 750 MG Tab  Commonly known as: ROBAXIN  Take 1 tablet (750 mg total) by mouth 4 (four) times daily. for 10 days  What changed:   · when to take this  · reasons to take this        CONTINUE taking these medications    amLODIPine 10 MG tablet  Commonly known as: NORVASC  Take 10 mg by mouth every morning.     aspirin 81 MG EC tablet  Commonly known as: ECOTRIN  Take 81 mg by mouth once daily.     donepeziL 10 MG tablet  Commonly known as: ARICEPT  Take 10 mg by mouth every morning.     gabapentin 100 MG capsule  Commonly known as: NEURONTIN  Take 2 capsules (200 mg total) by mouth 3 (three) times daily.     hydrocortisone-pramoxine 2.5-1 % Crea  Commonly known as: ANALPRAM-HC  INSERT BY  RECTUM 3 TIMES A DAY AS NEEDED HEMORRHOIDS     oxyCODONE-acetaminophen 5-325 mg per tablet  Commonly known as: PERCOCET  Take 1 tablet by mouth every 4 (four) hours as needed for Pain.     rosuvastatin 20 MG tablet  Commonly known as: CRESTOR  Take 20 mg by mouth every evening.     traZODone 50 MG tablet  Commonly known as: DESYREL  Take 50 mg by mouth every evening.        ASK your doctor about these medications    hydrocortisone 2.5 % cream  APPLY TO AFFECTED AREA TWICE A DAY            Leti Pretty PA-C  Neurosurgery  [facility] - Neurosurgery ([facility])

## 2022-09-03 NOTE — ANESTHESIA POSTPROCEDURE EVALUATION
Anesthesia Post Evaluation    Patient: Jose Davis    Procedure(s) Performed: Procedure(s) (LRB):  DISCECTOMY, SPINE, CERVICAL, ANTERIOR APPROACH, WITH FUSION C4-6 revision (N/A)  FUSION, SPINE, CERVICAL, POSTERIOR APPROACH C3-T1 Fusion C4-C6  laminectomy (N/A)  CATHETERIZATION, URETHRA  CYSTOSCOPY (N/A)    Final Anesthesia Type: general      Patient location during evaluation: PACU  Patient participation: Yes- Able to Participate  Level of consciousness: awake and alert and oriented  Post-procedure vital signs: reviewed and stable  Pain management: adequate  Airway patency: patent    PONV status at discharge: No PONV  Anesthetic complications: no      Cardiovascular status: hemodynamically stable  Respiratory status: unassisted, spontaneous ventilation and room air  Hydration status: euvolemic  Follow-up not needed.          Vitals Value Taken Time   /76 08/30/22 1131   Temp 35.9 °C (96.6 °F) 08/30/22 1131   Pulse 76 08/30/22 1131   Resp 20 08/30/22 1131   SpO2 91 % 08/30/22 1131         Event Time   Out of Recovery 21:15:00         Pain/Dominique Score: No data recorded

## 2022-09-07 LAB
FINAL PATHOLOGIC DIAGNOSIS: NORMAL
GROSS: NORMAL
Lab: NORMAL
MICROSCOPIC EXAM: NORMAL

## 2022-09-10 NOTE — PHYSICIAN QUERY
PT Name: Jose Davis  MR #: [id number]    DOCUMENTATION CLARIFICATION     CDS/: DELANO Lizama, RN              Contact information:[email or web address]    WITHDRAWN [facility]  Duplicate Query  This form is a permanent docum,ent in the medical record.     Query Date: September 9, 2022    By submitting this query, we are merely seeking further clarification of documentation.  Please utilize your independent clinical judgment when addressing the question(s) below.    The medical record contains the following:  Pathology Findings Location in Medical Record           Please clarify:  [  ] Pathology findings noted above are ruled in/confirmed as diagnoses   [  ] Pathology findings noted above are not confirmed as diagnoses   [  ] Pathology findings noted above are incidental   [  ] Other diagnosis (please specify): ___________   [  ] Clinically Undetermined       Please document in your progress notes daily for the duration of treatment until resolved and include in your discharge summary.    Form No. [id number]

## 2022-09-12 NOTE — PHYSICIAN QUERY
PT Name: Jose Davis  MR #: [id number]    DOCUMENTATION CLARIFICATION     CDS/: DELANO Lizama, RN               Contact information:[email or web address]  This form is a permanent document in the medical record.     Query Date: September 12, 2022    By submitting this query, we are merely seeking further clarification of documentation.  Please utilize your independent clinical judgment when addressing the question(s) below.    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    Form No. [id number]

## 2022-09-13 ENCOUNTER — TELEPHONE (OUTPATIENT)
Dept: NEUROSURGERY | Facility: CLINIC | Age: 77
End: 2022-09-13
Payer: MEDICARE

## 2022-09-13 NOTE — TELEPHONE ENCOUNTER
----- Message from Cristina Tonie sent at 9/13/2022  2:51 PM CDT -----  Contact: JARED GRIJALVA [[id number]]  Type: Call Back      Who called: JARED GRIJALVA [[id number]]      What is the request in detail: Patient is requesting a call back. Pt would like to know if he can cahnge his 09/16 appointment into a virtual appointment. Please advise.       Can the clinic reply by [facility]? No      Would the patient rather a call back or a response via [facility]? Call back       Best call back number: [phone] (mobile)      Additional Information:

## 2022-09-16 ENCOUNTER — TELEPHONE (OUTPATIENT)
Dept: NEUROSURGERY | Facility: CLINIC | Age: 77
End: 2022-09-16
Payer: MEDICARE

## 2022-09-18 ENCOUNTER — DOCUMENT SCAN (OUTPATIENT)
Dept: HOME HEALTH SERVICES | Facility: HOSPITAL | Age: 77
End: 2022-09-18
Payer: MEDICARE

## 2022-09-19 ENCOUNTER — DOCUMENT SCAN (OUTPATIENT)
Dept: HOME HEALTH SERVICES | Facility: HOSPITAL | Age: 77
End: 2022-09-19
Payer: MEDICARE

## 2022-09-26 ENCOUNTER — EXTERNAL HOME HEALTH (OUTPATIENT)
Dept: HOME HEALTH SERVICES | Facility: HOSPITAL | Age: 77
End: 2022-09-26
Payer: MEDICARE

## 2022-09-26 LAB — FUNGUS SPEC CULT: NORMAL

## 2022-09-26 PROCEDURE — G0180 MD CERTIFICATION HHA PATIENT: HCPCS | Mod: ,,, | Performed by: STUDENT IN AN ORGANIZED HEALTH CARE EDUCATION/TRAINING PROGRAM

## 2022-09-26 PROCEDURE — G0180 PR HOME HEALTH MD CERTIFICATION: ICD-10-PCS | Mod: ,,, | Performed by: STUDENT IN AN ORGANIZED HEALTH CARE EDUCATION/TRAINING PROGRAM

## 2022-09-30 ENCOUNTER — HOSPITAL ENCOUNTER (OUTPATIENT)
Dept: RADIOLOGY | Facility: CLINIC | Age: 77
Discharge: HOME | End: 2022-09-30
Payer: MEDICARE

## 2022-09-30 DIAGNOSIS — M48.02 CERVICAL STENOSIS OF SPINE: ICD-10-CM

## 2022-09-30 PROCEDURE — 72040 XR CERVICAL SPINE AP LATERAL: ICD-10-PCS | Mod: S$GLB,,, | Performed by: RADIOLOGY

## 2022-09-30 PROCEDURE — 72040 X-RAY EXAM NECK SPINE 2-3 VW: CPT | Mod: S$GLB,,, | Performed by: RADIOLOGY

## 2022-10-06 ENCOUNTER — HOSPITAL ENCOUNTER (OUTPATIENT)
Dept: RADIOLOGY | Facility: OTHER | Age: 77
Discharge: HOME | End: 2022-10-06
Attending: STUDENT IN AN ORGANIZED HEALTH CARE EDUCATION/TRAINING PROGRAM
Payer: MEDICARE

## 2022-10-06 ENCOUNTER — OFFICE VISIT (OUTPATIENT)
Dept: SPINE | Facility: CLINIC | Age: 77
End: 2022-10-06
Payer: MEDICARE

## 2022-10-06 VITALS — DIASTOLIC BLOOD PRESSURE: 91 MMHG | HEART RATE: 75 BPM | SYSTOLIC BLOOD PRESSURE: 135 MMHG

## 2022-10-06 DIAGNOSIS — Z98.1 S/P CERVICAL SPINAL FUSION: ICD-10-CM

## 2022-10-06 DIAGNOSIS — Z98.1 S/P CERVICAL SPINAL FUSION: Primary | ICD-10-CM

## 2022-10-06 PROCEDURE — 99999 PR PBB SHADOW E&M-EST. PATIENT-LVL II: ICD-10-PCS | Mod: PBBFAC,,, | Performed by: STUDENT IN AN ORGANIZED HEALTH CARE EDUCATION/TRAINING PROGRAM

## 2022-10-06 PROCEDURE — 3080F PR MOST RECENT DIASTOLIC BLOOD PRESSURE >= 90 MM HG: ICD-10-PCS | Mod: CPTII,S$GLB,, | Performed by: STUDENT IN AN ORGANIZED HEALTH CARE EDUCATION/TRAINING PROGRAM

## 2022-10-06 PROCEDURE — 72125 CT NECK SPINE W/O DYE: CPT | Mod: 26,,, | Performed by: RADIOLOGY

## 2022-10-06 PROCEDURE — 99024 PR POST-OP FOLLOW-UP VISIT: ICD-10-PCS | Mod: S$GLB,,, | Performed by: STUDENT IN AN ORGANIZED HEALTH CARE EDUCATION/TRAINING PROGRAM

## 2022-10-06 PROCEDURE — 3075F SYST BP GE 130 - 139MM HG: CPT | Mod: CPTII,S$GLB,, | Performed by: STUDENT IN AN ORGANIZED HEALTH CARE EDUCATION/TRAINING PROGRAM

## 2022-10-06 PROCEDURE — 72125 CT NECK SPINE W/O DYE: CPT | Mod: TC

## 2022-10-06 PROCEDURE — 99999 PR PBB SHADOW E&M-EST. PATIENT-LVL II: CPT | Mod: PBBFAC,,, | Performed by: STUDENT IN AN ORGANIZED HEALTH CARE EDUCATION/TRAINING PROGRAM

## 2022-10-06 PROCEDURE — 1125F AMNT PAIN NOTED PAIN PRSNT: CPT | Mod: CPTII,S$GLB,, | Performed by: STUDENT IN AN ORGANIZED HEALTH CARE EDUCATION/TRAINING PROGRAM

## 2022-10-06 PROCEDURE — 3075F PR MOST RECENT SYSTOLIC BLOOD PRESS GE 130-139MM HG: ICD-10-PCS | Mod: CPTII,S$GLB,, | Performed by: STUDENT IN AN ORGANIZED HEALTH CARE EDUCATION/TRAINING PROGRAM

## 2022-10-06 PROCEDURE — 1125F PR PAIN SEVERITY QUANTIFIED, PAIN PRESENT: ICD-10-PCS | Mod: CPTII,S$GLB,, | Performed by: STUDENT IN AN ORGANIZED HEALTH CARE EDUCATION/TRAINING PROGRAM

## 2022-10-06 PROCEDURE — 72125 CT CERVICAL SPINE WITHOUT CONTRAST: ICD-10-PCS | Mod: 26,,, | Performed by: RADIOLOGY

## 2022-10-06 PROCEDURE — 3080F DIAST BP >= 90 MM HG: CPT | Mod: CPTII,S$GLB,, | Performed by: STUDENT IN AN ORGANIZED HEALTH CARE EDUCATION/TRAINING PROGRAM

## 2022-10-06 PROCEDURE — 99024 POSTOP FOLLOW-UP VISIT: CPT | Mod: S$GLB,,, | Performed by: STUDENT IN AN ORGANIZED HEALTH CARE EDUCATION/TRAINING PROGRAM

## 2022-10-06 NOTE — PROGRESS NOTES
Neurosurgery  Established Patient    SUBJECTIVE:     History of Present Illness:  Jose Davis is a 77 y.o. male s/p anterior C5 corpectomy for epidural tumor and ACD C4-6 on 5/24/22. The patient is being seen in clinic today with his wife to continue to follow his symptoms and surgical hardware since surgery. States that he is doing well overall but is concerned about his continued difficulties with swallowing, right ear hearing loss, and dizziness when looking upward. He is followed by ENT and is scheduled to undergo a hearing test as well as a MRI of the brain. Denies fever or chills. He is compliant with the cervical collar and bone stimulator. Denies weakness, b/b dysfunction, saddle anesthesia, or gait instability.     Interval fu 10/6/22:  Pt presents for 6 wk fu.  He is s/p anterior revision of his C4-6 cervical plate and screws as well as posterior C3-T1 decompression/fusion with resection of residual epidural tumor.  He had a fall about 6 days ago shortly after his xrays were done where he was looking up into the sun and then fell backwards.  He wasn't wearing his collar.  Since he's had posterior neck soreness with radiation into his right trapezius.  He has no new radicular arm pain, numbness or weakness.  He has no difficulty swallowing.  He has no issues with wound healing.  His pathology from the ant/post revision came back as schwannoma WHO I which is the same as the path from his C5 corpectomy.    Review of patient's allergies indicates:  No Known Allergies    Current Outpatient Medications   Medication Sig Dispense Refill    amLODIPine (NORVASC) 10 MG tablet Take 10 mg by mouth every morning.      aspirin (ECOTRIN) 81 MG EC tablet Take 81 mg by mouth once daily.      donepeziL (ARICEPT) 10 MG tablet Take 10 mg by mouth every morning.      gabapentin (NEURONTIN) 100 MG capsule Take 2 capsules (200 mg total) by mouth 3 (three) times daily. 180 capsule 0    hydrocortisone 2.5 % cream APPLY TO  AFFECTED AREA TWICE A DAY 28.35 g 3    hydrocortisone-pramoxine (ANALPRAM-HC) 2.5-1 % Crea INSERT BY RECTUM 3 TIMES A DAY AS NEEDED HEMORRHOIDS      oxyCODONE-acetaminophen (PERCOCET) 5-325 mg per tablet Take 1 tablet by mouth every 4 (four) hours as needed for Pain. 56 tablet 0    rosuvastatin (CRESTOR) 20 MG tablet Take 20 mg by mouth every evening.      traZODone (DESYREL) 50 MG tablet Take 50 mg by mouth every evening.       No current facility-administered medications for this visit.       Past Medical History:   Diagnosis Date    Enlarged prostate     Hypertension      Past Surgical History:   Procedure Laterality Date    ANTERIOR CERVICAL DISCECTOMY W/ FUSION N/A 8/25/2022    Procedure: DISCECTOMY, SPINE, CERVICAL, ANTERIOR APPROACH, WITH FUSION C4-6 revision;  Surgeon: Abelino Hines DO;  Location: [facility] OR [address] [place] [address];  Service: Neurosurgery;  Laterality: N/A;  regular bed, supine, yuli, neuromonitoring, [facility], type and cross 2 units     cyst excision from neck      cystoscope      CYSTOSCOPY N/A 8/25/2022    Procedure: CYSTOSCOPY;  Surgeon: Edgar Linder MD;  Location: [facility] OR [address] [place] [address];  Service: Urology;  Laterality: N/A;    FUSION OF CERVICAL SPINE BY POSTERIOR APPROACH N/A 8/25/2022    Procedure: FUSION, SPINE, CERVICAL, POSTERIOR APPROACH C3-T1 Fusion C4-C6  laminectomy;  Surgeon: Abelino Hines DO;  Location: [facility] OR [address] [place] [address];  Service: Neurosurgery;  Laterality: N/A;  regualr bed, prone, diehl, yuli, neuromonitoring, [facility] douglas    HERNIA REPAIR      nephrectomy  2006    left for a tumor?    PROSTATE SURGERY      ROTATOR CUFF REPAIR      right    SURGICAL REMOVAL OF VERTEBRAL BODY OF CERVICAL SPINE N/A 5/24/2022    Procedure: CORPECTOMY, SPINE, CERVICAL -C5 corpectomy with C4-6 fusion;  Surgeon: Yaya Medina MD;  Location: [facility] OR [address] [place] [address];  Service: Neurosurgery;  Laterality: N/A;  neuromonitoring, krista  headset with traction, [place] J brace, c-arm, spinewave    TONSILLECTOMY       URETHRAL CATHETERIZATION  8/25/2022    Procedure: CATHETERIZATION, URETHRA;  Surgeon: Edgar Linder MD;  Location: [facility] OR [address] [place] [address];  Service: Urology;;     Family History       Problem Relation (Age of Onset)    COPD Father    Cancer Mother    Heart disease Father          Social History     Socioeconomic History    Marital status:    Tobacco Use    Smoking status: Never    Smokeless tobacco: Never   Substance and Sexual Activity    Alcohol use: No     Alcohol/week: 0.0 standard drinks    Drug use: No       Review of Systems  14 point ROS was negative    OBJECTIVE:     Vital Signs  Pulse: 75  BP: (!) 135/91  Pain Score:   3  There is no height or weight on file to calculate BMI.    Physical Exam:    Constitutional: He appears well-developed and well-nourished.     Eyes: Pupils are equal, round, and reactive to light.     Cardiovascular: Normal rate and regular rhythm.     Abdominal: Soft.     Psych/Behavior: He is alert. He is oriented to person, place, and time. He has a normal mood and affect.     Musculoskeletal:        Neck: Range of motion is limited.        Back: Range of motion is limited.     Neurological:        Sensory: There is no sensory deficit in the trunk. There is no sensory deficit in the extremities.        DTRs: DTRs are DTRS NORMAL AND SYMMETRICnormal and symmetric.        Cranial nerves: Cranial nerve(s) II, III, IV, V, VI, VII, VIII, IX, X, XI and XII are intact.     5/5 in BUE except 4+/5 in right deltoid   5/5 in BLE    Trace valle's on left    Left anterior neck incision well healed  Post neck incision well healed    Mild TTP along right paraspinal cervical muscles into right trapezius.    Diagnostic Results:  C spine xrays: s/p C5 corpectomy with ant fusion C4-6 and posterior C3-T1 posterior fusion.  All hardware in excellent position, no failure.  Good alignment.  Reviewed    ASSESSMENT/PLAN:     77 M with hx of prior C5 corpectomy with resection of schwannoma I on 5/24/22  who underwent anterior posterior cervical revision fusion for anterior hardware failure on 8/25/22 presents in fu.  Overall, he is doing great with improved RUE strength from his hospitalization and no issues with his swallowing.  His xrays prior to his fall looked excellent without hardware failure.  His path came back as schwannoma WHO I.  I plan to get CT c spine today to assess the construct after his fall and will let him know the results.  -Fu CT C spine today  -CT c spine and MRI c with and w/o in 6 weeks  -PT

## 2022-10-10 ENCOUNTER — DOCUMENT SCAN (OUTPATIENT)
Dept: HOME HEALTH SERVICES | Facility: HOSPITAL | Age: 77
End: 2022-10-10
Payer: MEDICARE

## 2022-10-11 ENCOUNTER — TELEPHONE (OUTPATIENT)
Dept: NEUROSURGERY | Facility: CLINIC | Age: 77
End: 2022-10-11
Payer: MEDICARE

## 2022-10-11 NOTE — TELEPHONE ENCOUNTER
----- Message from Javier Michael sent at 10/11/2022 11:42 AM CDT -----  Regarding: CAll BAck  Name of Who is Calling: Jennifer Wife              What is the request in detail: Jennifer requesting for the referral for physical therapy be sent to [facility]  [phone] In [facility] . Please assist              Can the clinic reply by [facility]: No              What Number to Call Back if not in [facility]:[phone]

## 2022-10-12 ENCOUNTER — DOCUMENT SCAN (OUTPATIENT)
Dept: HOME HEALTH SERVICES | Facility: HOSPITAL | Age: 77
End: 2022-10-12
Payer: MEDICARE

## 2022-10-13 LAB
ACID FAST MOD KINY STN SPEC: NORMAL
MYCOBACTERIUM SPEC QL CULT: NORMAL

## 2022-12-08 ENCOUNTER — TELEPHONE (OUTPATIENT)
Dept: NEUROSURGERY | Facility: CLINIC | Age: 77
End: 2022-12-08
Payer: MEDICARE

## 2022-12-08 ENCOUNTER — OFFICE VISIT (OUTPATIENT)
Dept: SPINE | Facility: CLINIC | Age: 77
End: 2022-12-08
Payer: MEDICARE

## 2022-12-08 ENCOUNTER — HOSPITAL ENCOUNTER (OUTPATIENT)
Dept: RADIOLOGY | Facility: OTHER | Age: 77
Discharge: HOME | End: 2022-12-08
Attending: STUDENT IN AN ORGANIZED HEALTH CARE EDUCATION/TRAINING PROGRAM
Payer: MEDICARE

## 2022-12-08 DIAGNOSIS — Z98.1 S/P CERVICAL SPINAL FUSION: Primary | ICD-10-CM

## 2022-12-08 DIAGNOSIS — Z98.1 S/P CERVICAL SPINAL FUSION: ICD-10-CM

## 2022-12-08 PROCEDURE — 72156 MRI CERVICAL SPINE W WO CONTRAST: ICD-10-PCS | Mod: 26,,, | Performed by: RADIOLOGY

## 2022-12-08 PROCEDURE — 72156 MRI NECK SPINE W/O & W/DYE: CPT | Mod: TC

## 2022-12-08 PROCEDURE — 1125F PR PAIN SEVERITY QUANTIFIED, PAIN PRESENT: ICD-10-PCS | Mod: CPTII,S$GLB,, | Performed by: STUDENT IN AN ORGANIZED HEALTH CARE EDUCATION/TRAINING PROGRAM

## 2022-12-08 PROCEDURE — 99213 PR OFFICE/OUTPT VISIT, EST, LEVL III, 20-29 MIN: ICD-10-PCS | Mod: S$GLB,,, | Performed by: STUDENT IN AN ORGANIZED HEALTH CARE EDUCATION/TRAINING PROGRAM

## 2022-12-08 PROCEDURE — 1125F AMNT PAIN NOTED PAIN PRSNT: CPT | Mod: CPTII,S$GLB,, | Performed by: STUDENT IN AN ORGANIZED HEALTH CARE EDUCATION/TRAINING PROGRAM

## 2022-12-08 PROCEDURE — 99213 OFFICE O/P EST LOW 20 MIN: CPT | Mod: S$GLB,,, | Performed by: STUDENT IN AN ORGANIZED HEALTH CARE EDUCATION/TRAINING PROGRAM

## 2022-12-08 PROCEDURE — 72156 MRI NECK SPINE W/O & W/DYE: CPT | Mod: 26,,, | Performed by: RADIOLOGY

## 2022-12-08 PROCEDURE — 99999 PR PBB SHADOW E&M-EST. PATIENT-LVL II: CPT | Mod: PBBFAC,,, | Performed by: STUDENT IN AN ORGANIZED HEALTH CARE EDUCATION/TRAINING PROGRAM

## 2022-12-08 PROCEDURE — A9585 GADOBUTROL INJECTION: HCPCS | Performed by: STUDENT IN AN ORGANIZED HEALTH CARE EDUCATION/TRAINING PROGRAM

## 2022-12-08 PROCEDURE — 25500020 PHARM REV CODE 255: Performed by: STUDENT IN AN ORGANIZED HEALTH CARE EDUCATION/TRAINING PROGRAM

## 2022-12-08 PROCEDURE — 99999 PR PBB SHADOW E&M-EST. PATIENT-LVL II: ICD-10-PCS | Mod: PBBFAC,,, | Performed by: STUDENT IN AN ORGANIZED HEALTH CARE EDUCATION/TRAINING PROGRAM

## 2022-12-08 RX ORDER — GADOBUTROL 604.72 MG/ML
5.5 INJECTION INTRAVENOUS
Status: COMPLETED | OUTPATIENT
Start: 2022-12-08 | End: 2022-12-08

## 2022-12-08 RX ADMIN — GADOBUTROL 5.5 ML: 604.72 INJECTION INTRAVENOUS at 02:12

## 2022-12-08 NOTE — PROGRESS NOTES
Neurosurgery  Established Patient    SUBJECTIVE:     History of Present Illness:  Jose Davis is a 77 y.o. male s/p anterior C5 corpectomy for epidural tumor and ACD C4-6 on 5/24/22. The patient is being seen in clinic today with his wife to continue to follow his symptoms and surgical hardware since surgery. States that he is doing well overall but is concerned about his continued difficulties with swallowing, right ear hearing loss, and dizziness when looking upward. He is followed by ENT and is scheduled to undergo a hearing test as well as a MRI of the brain. Denies fever or chills. He is compliant with the cervical collar and bone stimulator. Denies weakness, b/b dysfunction, saddle anesthesia, or gait instability.      Interval fu 10/6/22:  Pt presents for 6 wk fu.  He is s/p anterior revision of his C4-6 cervical plate and screws as well as posterior C3-T1 decompression/fusion with resection of residual epidural tumor.  He had a fall about 6 days ago shortly after his xrays were done where he was looking up into the sun and then fell backwards.  He wasn't wearing his collar.  Since he's had posterior neck soreness with radiation into his right trapezius.  He has no new radicular arm pain, numbness or weakness.  He has no difficulty swallowing.  He has no issues with wound healing.  His pathology from the ant/post revision came back as schwannoma WHO I which is the same as the path from his C5 corpectomy.    Interval fu 12/8/22:  Pt states his neck pain has improved since last visit as has his RUE strength.  He has been going to PT for his neck and now describes it as stiffness when turning his head.  He endorses worsened hand dexterity with difficulty picking up small objects.  He has no radicular arm pain or new numbness.  He still has dizziness and endorses short term memory loss.  He has worn his collar.    Review of patient's allergies indicates:  No Known Allergies    Current Outpatient Medications    Medication Sig Dispense Refill    amLODIPine (NORVASC) 10 MG tablet Take 10 mg by mouth every morning.      aspirin (ECOTRIN) 81 MG EC tablet Take 81 mg by mouth once daily.      donepeziL (ARICEPT) 10 MG tablet Take 10 mg by mouth every morning.      gabapentin (NEURONTIN) 100 MG capsule Take 2 capsules (200 mg total) by mouth 3 (three) times daily. 180 capsule 0    hydrocortisone 2.5 % cream APPLY TO AFFECTED AREA TWICE A DAY 28.35 g 3    hydrocortisone-pramoxine (ANALPRAM-HC) 2.5-1 % Crea INSERT BY RECTUM 3 TIMES A DAY AS NEEDED HEMORRHOIDS      oxyCODONE-acetaminophen (PERCOCET) 5-325 mg per tablet Take 1 tablet by mouth every 4 (four) hours as needed for Pain. 56 tablet 0    rosuvastatin (CRESTOR) 20 MG tablet Take 20 mg by mouth every evening.      traZODone (DESYREL) 50 MG tablet Take 50 mg by mouth every evening.       No current facility-administered medications for this visit.       Past Medical History:   Diagnosis Date    Enlarged prostate     Hypertension      Past Surgical History:   Procedure Laterality Date    ANTERIOR CERVICAL DISCECTOMY W/ FUSION N/A 8/25/2022    Procedure: DISCECTOMY, SPINE, CERVICAL, ANTERIOR APPROACH, WITH FUSION C4-6 revision;  Surgeon: Abelino Hines DO;  Location: [facility] OR [address] [place] [address];  Service: Neurosurgery;  Laterality: N/A;  regular bed, supine, yuli, neuromonitoring, [facility], type and cross 2 units     cyst excision from neck      cystoscope      CYSTOSCOPY N/A 8/25/2022    Procedure: CYSTOSCOPY;  Surgeon: Edgar Linder MD;  Location: [facility] OR [address] [place] [address];  Service: Urology;  Laterality: N/A;    FUSION OF CERVICAL SPINE BY POSTERIOR APPROACH N/A 8/25/2022    Procedure: FUSION, SPINE, CERVICAL, POSTERIOR APPROACH C3-T1 Fusion C4-C6  laminectomy;  Surgeon: Abelino Hines DO;  Location: [facility] OR [address] [place] [address];  Service: Neurosurgery;  Laterality: N/A;  regualr bed, prone, diehl, yuli, neuromonitoring, [facility]    HERNIA REPAIR      nephrectomy  2006    left for a  tumor?    PROSTATE SURGERY      ROTATOR CUFF REPAIR      right    SURGICAL REMOVAL OF VERTEBRAL BODY OF CERVICAL SPINE N/A 5/24/2022    Procedure: CORPECTOMY, SPINE, CERVICAL -C5 corpectomy with C4-6 fusion;  Surgeon: Yaya Medina MD;  Location: [address];  Service: Neurosurgery;  Laterality: N/A;  neuromonitoring, krista  headset with traction, [place] J brace, c-arm, spinewave    TONSILLECTOMY      URETHRAL CATHETERIZATION  8/25/2022    Procedure: CATHETERIZATION, URETHRA;  Surgeon: Edgar Linder MD;  Location: [facility] OR [address] [place] [address];  Service: Urology;;     Family History       Problem Relation (Age of Onset)    COPD Father    Cancer Mother    Heart disease Father          Social History     Socioeconomic History    Marital status:    Tobacco Use    Smoking status: Never    Smokeless tobacco: Never   Substance and Sexual Activity    Alcohol use: No     Alcohol/week: 0.0 standard drinks    Drug use: No       Review of Systems  14 point ROS was negative    OBJECTIVE:     Vital Signs  Pain Score:   5  There is no height or weight on file to calculate BMI.    Neurosurgery Physical Exam  Constitutional: He appears well-developed and well-nourished.      Eyes: Pupils are equal, round, and reactive to light.      Cardiovascular: Normal rate and regular rhythm.      Abdominal: Soft.      Psych/Behavior: He is alert. He is oriented to person, place, and time. He has a normal mood and affect.      Musculoskeletal:        Neck: Range of motion is limited.        Back: Range of motion is limited.      Neurological:        Sensory: There is no sensory deficit in the trunk. Diminished sensation in all fingers of left hand.        DTRs: DTRs are DTRS NORMAL AND SYMMETRICnormal and symmetric.        Cranial nerves: Cranial nerve(s) II, III, IV, V, VI, VII, VIII, IX, X, XI and XII are intact.      5/5 in BUE  5/5 in BLE     Trace valle's on left     Left anterior neck incision well healed  Post neck incision well  healed     Mild TTP along right paraspinal cervical muscles into right trapezius.    Diagnostic Results:  MRI c spine w and w/o: s/p C5 corpectomy and C3-T1 posterior decompression instrumented fusion.  Moderate central stenosis at C2/3.  No other additional levels of significant central canal stenosis.  Persistent right C5/6 extradural lesion causing no mass effect on thecal sac which is improved from prior MRI.  No cord signal change.  CT c spine: s/p C5 corpectomy with C4-6 anterior fusion; C3-T1 posterior decompression/fusion.  No failure, no fractures.  Early interbody fusion across corpectomy and posterolaterally.  Reviewed    ASSESSMENT/PLAN:   77 M with hx of prior C5 corpectomy with resection of schwannoma I on 5/24/22 who underwent anterior posterior cervical revision fusion for anterior hardware failure on 8/25/22 presents in fu.  His neck pain has improved since last visit as has his RUE strength.  His MRI is described above and shows no high grade stenosis with decompression of the cord where the prior C5 lesion was.  He continues to endorse dizziness and short term memory loss.  -ENT referral  -Neurology referral  -Discussed weaning his neurontin to 100 TID to see if it helps his dizziness.  -Continue PT  -Wean collar  -Fu in 3 mo with CT C spine

## 2022-12-08 NOTE — TELEPHONE ENCOUNTER
----- Message from Sarina Michael MA sent at 12/7/2022  5:04 PM CST -----  Regarding: FW: MRI -- (lab orders)    ----- Message -----  From: Brittany Schoen, RT  Sent: 12/7/2022   4:57 PM CST  To: [facility] Staff  Subject: MRI -- (lab orders)                              Good afternoon!    Mr. Davis is on the schedule at [facility] tomorrow 12/8/22 for an MRI of the cervical spine with/without contrast -- we need some updated labs prior to the MRI.  If possible, could you place an order for a STAT CMP? TIA!

## 2022-12-08 NOTE — TELEPHONE ENCOUNTER
Called patient to see if he can come early to have labs done before mri no response lvm will call back

## 2023-01-12 ENCOUNTER — TELEPHONE (OUTPATIENT)
Dept: NEUROLOGY | Facility: CLINIC | Age: 78
End: 2023-01-12
Payer: MEDICARE

## 2023-01-12 NOTE — TELEPHONE ENCOUNTER
Left message to call in regards to the appt on 2/15/23 with Onelia Pritchard.  The comments say memory and the referral says s/p cervical spinal fusion.  Need to clarify the reason for the appt.

## 2023-02-03 ENCOUNTER — TELEPHONE (OUTPATIENT)
Dept: ADMINISTRATIVE | Facility: OTHER | Age: 78
End: 2023-02-03
Payer: MEDICARE

## 2023-02-03 NOTE — TELEPHONE ENCOUNTER
Attempted to leave message with rescheduled Neurology appointment but voice mail is full.Appointment letter will be mailed.

## 2023-02-09 DIAGNOSIS — R42 DIZZINESS: Primary | ICD-10-CM

## 2023-02-09 DIAGNOSIS — H91.90 HEARING DIFFICULTY, UNSPECIFIED LATERALITY: ICD-10-CM

## 2023-02-14 ENCOUNTER — TELEPHONE (OUTPATIENT)
Dept: NEUROSURGERY | Facility: CLINIC | Age: 78
End: 2023-02-14
Payer: MEDICARE

## 2023-02-14 NOTE — TELEPHONE ENCOUNTER
----- Message from Sarina Michael MA sent at 12/13/2022  5:04 PM CST -----  3mos with new ct f/u Leti

## 2023-02-22 ENCOUNTER — TELEPHONE (OUTPATIENT)
Dept: SPINE | Facility: CLINIC | Age: 78
End: 2023-02-22
Payer: MEDICARE

## 2023-02-24 ENCOUNTER — TELEPHONE (OUTPATIENT)
Dept: NEUROLOGY | Facility: CLINIC | Age: 78
End: 2023-02-24
Payer: MEDICARE

## 2023-02-27 ENCOUNTER — TELEPHONE (OUTPATIENT)
Dept: NEUROLOGY | Facility: CLINIC | Age: 78
End: 2023-02-27
Payer: MEDICARE

## 2023-02-27 NOTE — TELEPHONE ENCOUNTER
Left message to call in regards to rescheduling the appt you have with Onelia Pritchard on 4/12/23.

## 2023-03-13 ENCOUNTER — TELEPHONE (OUTPATIENT)
Dept: NEUROLOGY | Facility: CLINIC | Age: 78
End: 2023-03-13
Payer: MEDICARE

## 2023-03-13 NOTE — TELEPHONE ENCOUNTER
Spoke to the pt's daughter, notified that we need to reschedule the appt on 4/14/23 with Onelia Pritchard. She verbalized understanding and will relay the message to her dad.  Unable to leave a VM on the pt's cell phone, VM full.

## 2023-03-16 ENCOUNTER — TELEPHONE (OUTPATIENT)
Dept: NEUROSURGERY | Facility: CLINIC | Age: 78
End: 2023-03-16
Payer: MEDICARE

## 2023-03-16 NOTE — TELEPHONE ENCOUNTER
Called x 3 - no answer. Left  informing patient of missed appointments today. I provided call back number and requested he reach back out tot get rescheduled. I said we will wait to hear back before rescheduling.

## 2023-03-30 ENCOUNTER — TELEPHONE (OUTPATIENT)
Dept: NEUROLOGY | Facility: CLINIC | Age: 78
End: 2023-03-30
Payer: MEDICARE

## 2023-03-30 NOTE — TELEPHONE ENCOUNTER
Appt on 4/12/23 at 1100 with Onelia Pritchard rescheduled to 5/1/23 at 1000.  Unable to reach the pt by phone.  Appt slip mailed to the pt.

## 2023-03-30 NOTE — TELEPHONE ENCOUNTER
Left message on to call in regards to rescheduling the appt with Onelia Gould on 4/12 on the pt and his daughters cell phone.

## 2024-07-10 ENCOUNTER — TELEPHONE (OUTPATIENT)
Dept: RHEUMATOLOGY | Facility: CLINIC | Age: 79
End: 2024-07-10
Payer: MEDICARE

## 2024-07-10 NOTE — TELEPHONE ENCOUNTER
Informed the patient a referral needs to be placed for Rheumatology. Once that is place he will be put on a waiting list for an appointment. Patient verbalized understanding.

## 2024-07-10 NOTE — TELEPHONE ENCOUNTER
----- Message from Tracie Alberts sent at 7/10/2024 12:07 PM CDT -----  Type:  Sooner Appointment Request    Caller is requesting a sooner appointment.  Caller declined first available appointment listed below.  Caller will not accept being placed on the waitlist and is requesting a message be sent to doctor.    Name of Caller:  pt wife   When is the first available appointment?  Needs sched   Symptoms:  leg and back pain , referred from pcp    Would the patient rather a call back or a response via [facility]? Call   Best Call Back Number:  [phone] (home)     Additional Information:  please advise \

## 2024-11-10 NOTE — NURSING
Pt transfer with Emily's transportation via wheelchair with personal belongings   [Negative] : Heme/Lymph

## (undated) DEVICE — PIN DISTRACTION DISP 14MM
Type: IMPLANTABLE DEVICE | Site: SPINE CERVICAL | Status: NON-FUNCTIONAL
Removed: 2022-08-25

## (undated) DEVICE — NDL HYPO REG 25G X 1 1/2

## (undated) DEVICE — TUBE FRAZIER 5MM 2FT SOFT TIP

## (undated) DEVICE — SUT MONOCRYL 4-0 PS-1 UND

## (undated) DEVICE — DURAPREP SURG SCRUB 26ML

## (undated) DEVICE — SUT STRATAFIX 1 PDS CT-1

## (undated) DEVICE — PINS SKULL ADULT MAYFIELD
Type: IMPLANTABLE DEVICE | Site: SPINE CERVICAL | Status: NON-FUNCTIONAL
Removed: 2022-08-25

## (undated) DEVICE — PACK ENDOSCOPY GENERAL

## (undated) DEVICE — KIT SURGIFLO HEMOSTATIC MATRIX

## (undated) DEVICE — BUR BONE CUT MICRO TPS 3X3.8MM

## (undated) DEVICE — DRESSING SURGICAL 1/2X1/2

## (undated) DEVICE — DRAPE STERI INSTRUMENT 1018

## (undated) DEVICE — SUT 3/0 30IN ETHILON BLK M

## (undated) DEVICE — SEE MEDLINE ITEM 156905

## (undated) DEVICE — MARKER SKIN STND TIP BLUE BARR

## (undated) DEVICE — DRESSING MEPILEX BORDR AG 4X12

## (undated) DEVICE — SEE MEDLINE ITEM 146417

## (undated) DEVICE — DIFFUSER

## (undated) DEVICE — BIT DRILL CERVICIAL 14MM

## (undated) DEVICE — SUT CTD VICRYL 3-0 CR/SH

## (undated) DEVICE — SOL 9P NACL IRR PIC IL

## (undated) DEVICE — ADHESIVE DERMABOND ADVANCED

## (undated) DEVICE — SUTURE STRATAFIX PGAPCL 4 UNI

## (undated) DEVICE — DRAPE C ARM 42 X 120 10/BX

## (undated) DEVICE — SUT VICRYL 2 0 CT 2

## (undated) DEVICE — CORD BIPOLAR 12 FOOT

## (undated) DEVICE — NDL SPINAL 18GX3.5 SPINOCAN

## (undated) DEVICE — SYR IRRIGATION BULB STER 60ML

## (undated) DEVICE — COVER PROXIMA MAYO STAND

## (undated) DEVICE — BLADE 4IN EDGE INSULATED

## (undated) DEVICE — GAUZE SPONGE PEANUT STRL

## (undated) DEVICE — BLANKET UPPER BODY 78.7X29.9IN

## (undated) DEVICE — SUT VICRYL PLUS 0 CT1 18IN

## (undated) DEVICE — CARTRIDGE OIL

## (undated) DEVICE — SPONGE GAUZE 16PLY 4X4

## (undated) DEVICE — COVER OVERHEAD SURG LT BLUE

## (undated) DEVICE — DRAPE THYROID WITH ARMBOARD

## (undated) DEVICE — Device

## (undated) DEVICE — DRAPE OPMI STERILE

## (undated) DEVICE — BLADE MILL BONE MEDIUM

## (undated) DEVICE — SUT VICRYL PLUS 3-0 SH 18IN

## (undated) DEVICE — DRAIN PENROSE XRAY 18 X 1/4 ST

## (undated) DEVICE — CONTAINER SPECIMEN OR STER 4OZ

## (undated) DEVICE — ELECTRODE REM PLYHSV RETURN 9

## (undated) DEVICE — DRAPE STERI-DRAPE 1000 17X11IN

## (undated) DEVICE — DRAPE C-ARM ELAS CLIP 42X120IN

## (undated) DEVICE — KIT EVACUATOR 3-SPRING 1/8 DRN

## (undated) DEVICE — GAUZE SPONGE 4X4 12PLY

## (undated) DEVICE — TAPE SILK 3IN

## (undated) DEVICE — APPLICATOR CHLORAPREP ORN 26ML

## (undated) DEVICE — GLOVE BIOGEL 7.5

## (undated) DEVICE — SYS CLSR DERMABOND PRINEO 22CM

## (undated) DEVICE — SYR 10CC LUER LOCK

## (undated) DEVICE — SEALER AQUAMANTYS 2.3 BIPOLAR

## (undated) DEVICE — DRAPE TOP 53X102IN

## (undated) DEVICE — PACK SET UP CONVERTORS

## (undated) DEVICE — DRAPE C-ARMOR EQUIPMENT COVER

## (undated) DEVICE — DRAPE T THYROID STERILE

## (undated) DEVICE — DRAPE INCISE IOBAN 2 23X17IN

## (undated) DEVICE — CHLORAPREP 10.5 ML APPLICATOR

## (undated) DEVICE — SUT VICRYL 2-0 8-18 CP-2